# Patient Record
Sex: FEMALE | Race: WHITE | NOT HISPANIC OR LATINO | Employment: OTHER | URBAN - METROPOLITAN AREA
[De-identification: names, ages, dates, MRNs, and addresses within clinical notes are randomized per-mention and may not be internally consistent; named-entity substitution may affect disease eponyms.]

---

## 2017-01-19 ENCOUNTER — OFFICE VISIT (OUTPATIENT)
Dept: INTERNAL MEDICINE | Facility: CLINIC | Age: 82
End: 2017-01-19
Payer: MEDICARE

## 2017-01-19 VITALS
OXYGEN SATURATION: 96 % | DIASTOLIC BLOOD PRESSURE: 60 MMHG | HEIGHT: 69 IN | BODY MASS INDEX: 27.98 KG/M2 | SYSTOLIC BLOOD PRESSURE: 119 MMHG | RESPIRATION RATE: 16 BRPM | WEIGHT: 188.94 LBS | HEART RATE: 90 BPM | TEMPERATURE: 99 F

## 2017-01-19 DIAGNOSIS — E03.9 HYPOTHYROIDISM, UNSPECIFIED TYPE: ICD-10-CM

## 2017-01-19 DIAGNOSIS — Z23 NEED FOR PNEUMOCOCCAL VACCINATION: ICD-10-CM

## 2017-01-19 DIAGNOSIS — E78.00 HYPERCHOLESTEROLEMIA: Primary | ICD-10-CM

## 2017-01-19 DIAGNOSIS — R41.3 MEMORY DEFICITS: ICD-10-CM

## 2017-01-19 DIAGNOSIS — R73.02 IGT (IMPAIRED GLUCOSE TOLERANCE): ICD-10-CM

## 2017-01-19 DIAGNOSIS — L91.8 SKIN TAGS, MULTIPLE ACQUIRED: ICD-10-CM

## 2017-01-19 PROCEDURE — 1157F ADVNC CARE PLAN IN RCRD: CPT | Mod: S$GLB,,, | Performed by: INTERNAL MEDICINE

## 2017-01-19 PROCEDURE — 99214 OFFICE O/P EST MOD 30 MIN: CPT | Mod: 25,S$GLB,, | Performed by: INTERNAL MEDICINE

## 2017-01-19 PROCEDURE — 1160F RVW MEDS BY RX/DR IN RCRD: CPT | Mod: S$GLB,,, | Performed by: INTERNAL MEDICINE

## 2017-01-19 PROCEDURE — 1126F AMNT PAIN NOTED NONE PRSNT: CPT | Mod: S$GLB,,, | Performed by: INTERNAL MEDICINE

## 2017-01-19 PROCEDURE — 90670 PCV13 VACCINE IM: CPT | Mod: S$GLB,,, | Performed by: INTERNAL MEDICINE

## 2017-01-19 PROCEDURE — 1159F MED LIST DOCD IN RCRD: CPT | Mod: S$GLB,,, | Performed by: INTERNAL MEDICINE

## 2017-01-19 PROCEDURE — G0009 ADMIN PNEUMOCOCCAL VACCINE: HCPCS | Mod: S$GLB,,, | Performed by: INTERNAL MEDICINE

## 2017-01-19 RX ORDER — DONEPEZIL HYDROCHLORIDE 5 MG/1
5 TABLET, ORALLY DISINTEGRATING ORAL DAILY
Qty: 30 TABLET | Refills: 11 | Status: SHIPPED | OUTPATIENT
Start: 2017-01-19 | End: 2017-04-29

## 2017-01-19 NOTE — MR AVS SNAPSHOT
[facility] Internal Medicine  [address],  [address]  [facility] [address]  Phone: [phone]  Fax: [phone]                  Chloe Foster   2017 1:40 PM   Office Visit    Description:  Female : 3/25/1929   Provider:  Kimberly Pérez MD   Department:  [facility] Internal Medicine           Reason for Visit     Follow-up     Memory Loss           Diagnoses this Visit        Comments    Hypercholesterolemia    -  Primary     Hypothyroidism, unspecified type         IGT (impaired glucose tolerance)         Memory deficits         Need for pneumococcal vaccination         Skin tags, multiple acquired                To Do List           Goals (5 Years of Data)     None       These Medications        Disp Refills Start End    donepezil (ARICEPT ODT) 5 MG TbDL 30 tablet 11 2017    Take 1 tablet (5 mg total) by mouth once daily. - Oral    Pharmacy: [place] [address] Ph #: [phone]         [facility]     [facility] On Call Nurse Care Line -  Assistance  Registered nurses in the [facility] On Call Center provide clinical advisement, health education, appointment booking, and other advisory services.  Call for this free service at [phone].             Medications           Message regarding Medications     Verify the changes and/or additions to your medication regime listed below are the same as discussed with your clinician today.  If any of these changes or additions are incorrect, please notify your healthcare provider.        START taking these NEW medications        Refills    donepezil (ARICEPT ODT) 5 MG TbDL 11    Sig: Take 1 tablet (5 mg total) by mouth once daily.    Class: Normal    Route: Oral           Verify that the below list of medications is an accurate representation of the medications you are currently taking.  If none reported, the list may be blank. If incorrect, please contact your  "healthcare provider. Carry this list with you in case of emergency.           Current Medications     ANTIOX#10/OM3/DHA/EPA/LUT/ZEAX (I-CAPS ORAL) Take 2 capsules by mouth once daily.    calcium-vitamin D (OSCAL) 250 (625)-125 mg-unit per tablet Take 1 tablet by mouth 2 (two) times daily.    levothyroxine (SYNTHROID) 88 MCG tablet Take 1 tablet (88 mcg total) by mouth before breakfast.    multivit-iron-FA-calcium &mins (ONE-A-DAY WOMENS FORMULA) 18 mg iron-400 mcg-500 mg Ca Tab Take 1 tablet by mouth.      pravastatin (PRAVACHOL) 40 MG tablet TAKE 1 TABLET BY MOUTH ONCE DAILY FOR CHOLESTEROL.    sertraline (ZOLOFT) 100 MG tablet TAKE ONE TABLET BY MOUTH DAILY.    donepezil (ARICEPT ODT) 5 MG TbDL Take 1 tablet (5 mg total) by mouth once daily.           Clinical Reference Information           Vital Signs - Last Recorded  Most recent update: 1/19/2017  1:28 PM by Jennifer Jones MA    BP Pulse Temp Resp Ht Wt    119/60 (BP Location: Left arm, Patient Position: Sitting, BP Method: Manual) 90 98.7 °F (37.1 °C) (Oral) 16 5' 9" (1.753 m) 85.7 kg (188 lb 15 oz)    SpO2 BMI             96% 27.9 kg/m2         Blood Pressure          Most Recent Value    BP  119/60      Allergies as of 1/19/2017     No Known Drug Allergies      Immunizations Administered on Date of Encounter - 1/19/2017     Name Date Dose VIS Date Route    Pneumococcal Conjugate - 13 Valent  Incomplete 0.5 mL 11/5/2015 Intramuscular    TDAP  Incomplete 0.5 mL 2/24/2015 Intramuscular      Orders Placed During Today's Visit      Normal Orders This Visit    Ambulatory referral to Dermatology     Pneumococcal Conjugate Vaccine (13 Valent) (IM)     Tdap Vaccine (Adult)     Future Labs/Procedures Expected by Expires    CBC auto differential  1/19/2017 1/19/2018    Comprehensive metabolic panel  1/19/2017 1/19/2018    Hemoglobin A1c  1/19/2017 1/19/2018    Lipid panel  1/19/2017 1/19/2018    TSH  1/19/2017 1/19/2018    Urinalysis  1/19/2017 1/19/2018      "

## 2017-01-19 NOTE — PROGRESS NOTES
"Subjective:      Patient ID: Chloe Foster is a 87 y.o. female.    Chief Complaint: Follow-up and Memory Loss    HPI: 87y/oWF, whose family has noted that she is a bit more forgetful.  She drives in her neighborhood.  She lives alone.  She has not had any problems with cooking, but forgot where she put money.    Review of Systems   Constitutional: Negative.    HENT: Negative.    Eyes: Negative.    Respiratory: Negative.    Cardiovascular: Negative.    Gastrointestinal: Negative.    Endocrine: Negative.    Genitourinary: Negative.    Musculoskeletal: Negative.    Skin:        Scratches at skin tags on neck   Neurological: Negative.  Negative for dizziness, weakness and headaches.   Hematological: Negative.    Psychiatric/Behavioral: Positive for decreased concentration.       Objective:     Visit Vitals    /60 (BP Location: Left arm, Patient Position: Sitting, BP Method: Manual)    Pulse 90    Temp 98.7 °F (37.1 °C) (Oral)    Resp 16    Ht 5' 9" (1.753 m)    Wt 85.7 kg (188 lb 15 oz)    SpO2 96%    BMI 27.9 kg/m2       Physical Exam   Constitutional: She is oriented to person, place, and time. She appears well-developed and well-nourished.   HENT:   Head: Normocephalic and atraumatic.   Right Ear: External ear normal.   Left Ear: External ear normal.   Nose: Nose normal.   Mouth/Throat: Oropharynx is clear and moist.   Eyes: Conjunctivae and EOM are normal. Pupils are equal, round, and reactive to light.   Neck: Normal range of motion. No JVD present.   Cardiovascular: Normal rate, regular rhythm and normal heart sounds.    Pulmonary/Chest: Effort normal and breath sounds normal.   Abdominal: Soft. Bowel sounds are normal. There is no hepatosplenomegaly. There is no tenderness.   Musculoskeletal: Normal range of motion.   Lymphadenopathy:     She has no cervical adenopathy.   Neurological: She is alert and oriented to person, place, and time.   Skin: Skin is warm and dry.   Multitude of skin tags " all over neck,chest.  AK all over back   Psychiatric: She has a normal mood and affect. Her behavior is normal. Judgment and thought content normal.   Nursing note and vitals reviewed.  Can remember 3 things, do 3 things,draw a clock, good insight.    Assessment:     1. Hypercholesterolemia    2. Hypothyroidism, unspecified type    3. IGT (impaired glucose tolerance)    4. Memory deficits    5. Need for pneumococcal vaccination    6. Skin tags, multiple acquired    No recent labs, will do work up.  Plan:     Hypercholesterolemia  -     Lipid panel; Future; Expected date: 1/19/17    Hypothyroidism, unspecified type  -     TSH; Future; Expected date: 1/19/17    IGT (impaired glucose tolerance)  -     CBC auto differential; Future; Expected date: 1/19/17  -     Comprehensive metabolic panel; Future; Expected date: 1/19/17  -     Urinalysis; Future; Expected date: 1/19/17  -     Hemoglobin A1c; Future; Expected date: 1/19/17    Memory deficits  -     CBC auto differential; Future; Expected date: 1/19/17  -     Comprehensive metabolic panel; Future; Expected date: 1/19/17  -     donepezil (ARICEPT ODT) 5 MG TbDL; Take 1 tablet (5 mg total) by mouth once daily.  Dispense: 30 tablet; Refill: 11    Need for pneumococcal vaccination  -     Pneumococcal Conjugate Vaccine (13 Valent) (IM)    Skin tags, multiple acquired  -     Ambulatory referral to Dermatology    Other orders  -     Cancel: Tdap Vaccine (Adult)

## 2017-02-06 ENCOUNTER — OFFICE VISIT (OUTPATIENT)
Dept: INTERNAL MEDICINE | Facility: CLINIC | Age: 82
End: 2017-02-06
Payer: MEDICARE

## 2017-02-06 VITALS
RESPIRATION RATE: 16 BRPM | SYSTOLIC BLOOD PRESSURE: 110 MMHG | TEMPERATURE: 98 F | WEIGHT: 185.63 LBS | HEIGHT: 69 IN | DIASTOLIC BLOOD PRESSURE: 60 MMHG | BODY MASS INDEX: 27.49 KG/M2 | OXYGEN SATURATION: 98 % | HEART RATE: 80 BPM

## 2017-02-06 DIAGNOSIS — K52.9 GASTROENTERITIS: Primary | ICD-10-CM

## 2017-02-06 DIAGNOSIS — R53.83 FATIGUE, UNSPECIFIED TYPE: ICD-10-CM

## 2017-02-06 PROCEDURE — 99213 OFFICE O/P EST LOW 20 MIN: CPT | Mod: S$GLB,,, | Performed by: INTERNAL MEDICINE

## 2017-02-06 NOTE — MR AVS SNAPSHOT
[facility] Internal Medicine  [address],  [address]  [place] [address]  Phone: [phone]  Fax: [phone]                  Chloe Foster   2017 2:20 PM   Office Visit    Description:  Female : 3/25/1929   Provider:  Kimberly Pérez MD   Department:  [facility] Internal Medicine           Reason for Visit     Follow-up     Urinary Tract Infection                To Do List           Goals (5 Years of Data)     None      [facility]     [facility] Nurse Care [facility] -  Assistance  Registered nurses in the [facility] On Call Center provide clinical advisement, health education, appointment booking, and other advisory services.  Call for this free service at [phone].             Medications           Message regarding Medications     Verify the changes and/or additions to your medication regime listed below are the same as discussed with your clinician today.  If any of these changes or additions are incorrect, please notify your healthcare provider.             Verify that the below list of medications is an accurate representation of the medications you are currently taking.  If none reported, the list may be blank. If incorrect, please contact your healthcare provider. Carry this list with you in case of emergency.           Current Medications     ANTIOX#10/OM3/DHA/EPA/LUT/ZEAX (I-CAPS ORAL) Take 2 capsules by mouth once daily.    calcium-vitamin D (OSCAL) 250 (625)-125 mg-unit per tablet Take 1 tablet by mouth 2 (two) times daily.    donepezil (ARICEPT ODT) 5 MG TbDL Take 1 tablet (5 mg total) by mouth once daily.    levothyroxine (SYNTHROID) 88 MCG tablet Take 1 tablet (88 mcg total) by mouth before breakfast.    multivit-iron-FA-calcium &mins (ONE-A-DAY WOMENS FORMULA) 18 mg iron-400 mcg-500 mg Ca Tab Take 1 tablet by mouth once daily.     pravastatin (PRAVACHOL) 40 MG tablet TAKE 1 TABLET BY MOUTH ONCE DAILY FOR CHOLESTEROL.    sertraline (ZOLOFT) 100  "MG tablet TAKE ONE TABLET BY MOUTH DAILY.           Clinical Reference Information           Your Vitals Were     BP Pulse Temp Resp Height Weight    110/60 (BP Location: Right arm, Patient Position: Sitting, BP Method: Manual) 80 98.1 °F (36.7 °C) (Oral) 16 5' 9" (1.753 m) 84.2 kg (185 lb 10 oz)    SpO2 BMI             98% 27.41 kg/m2         Blood Pressure          Most Recent Value    BP  110/60      Allergies as of 2/6/2017     No Known Drug Allergies      Immunizations Administered on Date of Encounter - 2/6/2017     None      Language Assistance Services     ATTENTION: Language assistance services are available, free of charge. Please call [phone].      ATENCIÓN: Si staciela radha, tiene a carrasco disposición servicios gratuitos de asistencia lingüística. Llame al [phone].     JEFF Ý: N?u b?n nói Ti?ng Vi?t, có các d?ch v? h? tr? ngôn ng? mi?n phí dành cho b?n. G?i s? [phone].         [facility] Internal Medicine complies with applicable Federal civil rights laws and does not discriminate on the basis of race, color, national origin, age, disability, or sex.        "

## 2017-02-06 NOTE — PROGRESS NOTES
"Subjective:      Patient ID: Chloe Foster is a 87 y.o. female.    Chief Complaint: Follow-up (pt in for follow ) and Urinary Tract Infection (frequant urination)    HPI: Went to a  3 days ago. Then went out to eat, may have been too greasy.  By the time she got home she had explosive, uncontrolled diarrhea. All weekend long  Had not felt like doing anything. Has the dwindles. Fatigued, but nothing hurts.  No fever,chills, N,V,C,D.  No dysuria.  No abdominal pain.  Seems to be tolerating the Aricept.  Review of Systems   All other systems reviewed and are negative.      Objective:     Visit Vitals    /60 (BP Location: Right arm, Patient Position: Sitting, BP Method: Manual)    Pulse 80    Temp 98.1 °F (36.7 °C) (Oral)    Resp 16    Ht 5' 9" (1.753 m)    Wt 84.2 kg (185 lb 10 oz)    SpO2 98%    BMI 27.41 kg/m2       Physical Exam   Constitutional: She is oriented to person, place, and time. She appears well-developed and well-nourished. No distress.   HENT:   Head: Normocephalic and atraumatic.   Nose: Nose normal.   Mouth/Throat: Oropharynx is clear and moist.   Eyes: Conjunctivae are normal. Pupils are equal, round, and reactive to light.   Neck: Normal range of motion.   Cardiovascular: Normal rate, regular rhythm and normal heart sounds.    Pulmonary/Chest: Effort normal and breath sounds normal.   Abdominal: Soft. Bowel sounds are normal. She exhibits no distension. There is no hepatosplenomegaly. There is no tenderness.   Musculoskeletal: Normal range of motion.   Neurological: She is alert and oriented to person, place, and time.   Skin: Skin is warm and dry. She is not diaphoretic.   Psychiatric: She has a normal mood and affect. Her behavior is normal.   Nursing note and vitals reviewed.      Assessment:     1. Gastroenteritis    2. Fatigue, unspecified type      Plan:   Symptomatic therapy. Eagle soft diet. Rest. Call in am.  Gastroenteritis    Fatigue, unspecified type    "

## 2017-02-08 RX ORDER — PRAVASTATIN SODIUM 40 MG/1
TABLET ORAL
Qty: 90 TABLET | Refills: 3 | Status: SHIPPED | OUTPATIENT
Start: 2017-02-08 | End: 2018-01-30 | Stop reason: SDUPTHER

## 2017-02-08 RX ORDER — SERTRALINE HYDROCHLORIDE 100 MG/1
100 TABLET, FILM COATED ORAL DAILY
Qty: 90 TABLET | Refills: 3 | Status: SHIPPED | OUTPATIENT
Start: 2017-02-08 | End: 2018-01-30 | Stop reason: SDUPTHER

## 2017-02-13 ENCOUNTER — TELEPHONE (OUTPATIENT)
Dept: ENDOCRINOLOGY | Facility: CLINIC | Age: 82
End: 2017-02-13

## 2017-02-13 ENCOUNTER — OFFICE VISIT (OUTPATIENT)
Dept: INTERNAL MEDICINE | Facility: CLINIC | Age: 82
End: 2017-02-13
Payer: MEDICARE

## 2017-02-13 VITALS
HEIGHT: 69 IN | HEART RATE: 90 BPM | SYSTOLIC BLOOD PRESSURE: 130 MMHG | DIASTOLIC BLOOD PRESSURE: 70 MMHG | OXYGEN SATURATION: 97 % | TEMPERATURE: 99 F | RESPIRATION RATE: 16 BRPM | WEIGHT: 186.06 LBS | BODY MASS INDEX: 27.56 KG/M2

## 2017-02-13 DIAGNOSIS — R14.0 GASEOUS ABDOMINAL DISTENTION: Primary | ICD-10-CM

## 2017-02-13 PROCEDURE — 99213 OFFICE O/P EST LOW 20 MIN: CPT | Mod: S$GLB,,, | Performed by: INTERNAL MEDICINE

## 2017-02-13 NOTE — MR AVS SNAPSHOT
[facility] Internal Medicine  [address],  [address]  [place] [address]  Phone: [phone]  Fax: [phone]                  Chloe Foster   2017 3:20 PM   Office Visit    Description:  Female : 3/25/1929   Provider:  Kimberly Pérez MD   Department:  [facility] Internal Medicine           Reason for Visit     Fatigue     Diarrhea     Gastroesophageal Reflux     Eating Disorder           Diagnoses this Visit        Comments    Gaseous abdominal distention    -  Primary            To Do List           Future Appointments        Provider Department Dept Phone    2017 3:30 PM [facility] Carrion[facility] - Endo/Diab/Metab [phone]      Goals (5 Years of Data)     None      [facility]     [facility] Nurse Care Line -  Assistance  Registered nurses in the [facility] On Call Center provide clinical advisement, health education, appointment booking, and other advisory services.  Call for this free service at [phone].             Medications           Message regarding Medications     Verify the changes and/or additions to your medication regime listed below are the same as discussed with your clinician today.  If any of these changes or additions are incorrect, please notify your healthcare provider.             Verify that the below list of medications is an accurate representation of the medications you are currently taking.  If none reported, the list may be blank. If incorrect, please contact your healthcare provider. Carry this list with you in case of emergency.           Current Medications     ANTIOX#10/OM3/DHA/EPA/LUT/ZEAX (I-CAPS ORAL) Take 2 capsules by mouth once daily.    calcium-vitamin D (OSCAL) 250 (625)-125 mg-unit per tablet Take 1 tablet by mouth 2 (two) times daily.    donepezil (ARICEPT ODT) 5 MG TbDL Take 1 tablet (5 mg total) by mouth once daily.    levothyroxine (SYNTHROID) 88 MCG tablet Take 1 tablet (88 mcg total)  "by mouth before breakfast.    multivit-iron-FA-calcium &mins (ONE-A-DAY WOMENS FORMULA) 18 mg iron-400 mcg-500 mg Ca Tab Take 1 tablet by mouth once daily.     pravastatin (PRAVACHOL) 40 MG tablet TAKE 1 TABLET BY MOUTH ONCE DAILY FOR CHOLESTEROL.    sertraline (ZOLOFT) 100 MG tablet Take 1 tablet (100 mg total) by mouth once daily.           Clinical Reference Information           Your Vitals Were     BP Pulse Temp Resp Height Weight    130/70 (BP Location: Right arm, Patient Position: Sitting, BP Method: Manual) 90 99.1 °F (37.3 °C) (Oral) 16 5' 9" (1.753 m) 84.4 kg (186 lb 1.1 oz)    SpO2 BMI             97% 27.48 kg/m2         Blood Pressure          Most Recent Value    BP  130/70      Allergies as of 2/13/2017     No Known Drug Allergies      Immunizations Administered on Date of Encounter - 2/13/2017     None      Orders Placed During Today's Visit     Future Labs/Procedures Expected by Expires    Creatinine, serum  2/13/2017 4/14/2018    CT Abdomen Pelvis W Wo Contrast  2/13/2017 2/13/2018      Language Assistance Services     ATTENTION: Language assistance services are available, free of charge. Please call [phone].      ATENCIÓN: Si habla radha, tiene a carrasco disposición servicios gratuitos de asistencia lingüística. Llame al [phone].     [facility] Ý: N?u b?n nói Ti?ng Vi?t, có các d?ch v? h? tr? ngôn ng? mi?n phí dành cho b?n. G?i s? [phone].         [facility] Internal Medicine complies with applicable Federal civil rights laws and does not discriminate on the basis of race, color, national origin, age, disability, or sex.        "

## 2017-02-13 NOTE — PROGRESS NOTES
"Subjective:      Patient ID: Chloe Foster is a 87 y.o. female.    Chief Complaint: Fatigue; Diarrhea; Gastroesophageal Reflux (lots of burping); and Eating Disorder (no diesire to eat)    HPI: 87y/oWF, here with her daughter.  Upon both of them checking history: pt. Had had a history years ago of having gallstones and IC.  She was begun on Aricept 1/19/17 for memory loss.  Then at the beginning of Feb 2017, she began having borborigmi. This progressed to having  Increased gas,flatus. She then got " food" poisoning,. Since then, she has had l;oss of appetite,  And fatigue. No fever. On/off diarrhea. No cramping or incontinence.    Review of Systems   Constitutional: Positive for appetite change. Negative for fatigue and fever.   HENT: Negative.    Eyes: Negative.    Gastrointestinal: Positive for abdominal distention, diarrhea and nausea. Negative for abdominal pain, anal bleeding, blood in stool, rectal pain and vomiting.   Genitourinary: Negative.    Musculoskeletal: Negative.    Skin: Negative.    Neurological: Negative.    Hematological: Negative.    Psychiatric/Behavioral: Positive for decreased concentration.       Objective:     Visit Vitals    /70 (BP Location: Right arm, Patient Position: Sitting, BP Method: Manual)    Pulse 90    Temp 99.1 °F (37.3 °C) (Oral)    Resp 16    Ht 5' 9" (1.753 m)    Wt 84.4 kg (186 lb 1.1 oz)    SpO2 97%    BMI 27.48 kg/m2       Physical Exam   Constitutional: She appears well-developed and well-nourished. No distress.   HENT:   Head: Normocephalic and atraumatic.   Right Ear: External ear normal.   Left Ear: External ear normal.   Nose: Nose normal.   Mouth/Throat: Oropharynx is clear and moist. No oropharyngeal exudate.   Eyes: EOM are normal. Pupils are equal, round, and reactive to light.   Neck: Normal range of motion. Neck supple.   Cardiovascular: Normal rate, regular rhythm and normal heart sounds.    Pulmonary/Chest: Effort normal and breath sounds " normal.   Abdominal: Normal appearance. She exhibits distension. She exhibits no shifting dullness and no abdominal bruit. Bowel sounds are increased. There is no hepatosplenomegaly. There is no tenderness.   Musculoskeletal: Normal range of motion.   Neurological: She is alert.   Skin: Skin is warm and dry. She is not diaphoretic.   Psychiatric: She has a normal mood and affect. Her behavior is normal.   Nursing note and vitals reviewed.      Assessment:     1. Gaseous abdominal distention    This also maybe a reaction to the Aricept.  However, it is important to rule out biliary/hepatic or other pathology.  Plan:     Gaseous abdominal distention  -     CT Abdomen Pelvis W Wo Contrast; Future; Expected date: 2/13/17  -     Creatinine, serum; Future; Expected date: 2/13/17

## 2017-02-13 NOTE — TELEPHONE ENCOUNTER
Left vm message that I am sorry I don't have a sooner appointment at this time but try calling back from time to time right at 8:00am as we do get cancels and time slots that release a week or two in advance. Also let us know what her PCP  Said and if she checks her thyroid and feels you need to be seen sooner as well.

## 2017-02-13 NOTE — TELEPHONE ENCOUNTER
----- Message from Tabitha Ocasio sent at 2/13/2017  1:50 PM CST -----  Contact: self   Pt is requesting an earlier appt due to not feeling great loss of appetite, fatigue and wanted to be seen earlier than the appt I was able to scheduled and would like the nurse to call her back to discuss. Pt has schedule an appt with her PCP as well.    Pt can be reached at  [phone].

## 2017-02-16 ENCOUNTER — TELEPHONE (OUTPATIENT)
Dept: INTERNAL MEDICINE | Facility: CLINIC | Age: 82
End: 2017-02-16

## 2017-02-16 NOTE — TELEPHONE ENCOUNTER
----- Message from Rae Smith LPN sent at 2/16/2017  9:12 AM CST -----  Called patient in reference to scheduling her CT scan. Patient states that she was informed to hold off on the CT until her lab results came in. Patient had her lab work on 2/14/17. Does she still need the CT scan? Please advise

## 2017-02-20 ENCOUNTER — TELEPHONE (OUTPATIENT)
Dept: INTERNAL MEDICINE | Facility: CLINIC | Age: 82
End: 2017-02-20

## 2017-02-20 NOTE — TELEPHONE ENCOUNTER
----- Message from Alicia Uribe sent at 2/20/2017  9:09 AM CST -----  Contact: Viry/ daughter   was suppose to call patient about lab results before she has her CT done. Daughter is concerned that they are scheduling the test before she speaks to the doc. Please call Viry back at [phone]

## 2017-02-20 NOTE — TELEPHONE ENCOUNTER
Spoke with daughter and she states that someone from outpatient called the patient and scheduled the CT scan. The patient's daughter stated that they were waiting on a call back from Dr. Pérez regarding the patients lab results. Dr. Pérez sent me a message regarding this patient on Friday but signed the encounter and I didn't get the message. Patient's daughter asked that I call the patient and give her the information.     Spoke with the patient regarding her CT scan and she states that someone from scheduling called and scheduled her CT scan for tomorrow at 9:00am. Patient states that they told her that she needed to get labs done before the CT scan. Patient had her kidney function test done on 2/14/17 but they didn't draw the labs from 1/19/17. Explained to the patient that the labs on 1/19/17 were not drawn and asked the patient isf she could come in earlier on tomorrow before her CT scan and get those done. Patient states that she can but is confused to as why they didn't draw all of her labs on 2/14/17. I explained to her that it could have been because the labs had different dates and maybe they weren't sure if all of the labs needed to be done. I explained to her that I was sorry this happened but I schedule her for labs on 2/23/17 to make sure all of her labs get drawn. Pateint verbalized understanding

## 2017-02-21 ENCOUNTER — TELEPHONE (OUTPATIENT)
Dept: INTERNAL MEDICINE | Facility: CLINIC | Age: 82
End: 2017-02-21

## 2017-02-21 NOTE — TELEPHONE ENCOUNTER
----- Message from Alicia Uribe sent at 2/21/2017  3:38 PM CST -----  Contact: patient   Patient had a CT done today and was told Dr. Pérez would have the results today. She would like a call back [phone]

## 2017-02-22 ENCOUNTER — TELEPHONE (OUTPATIENT)
Dept: INTERNAL MEDICINE | Facility: CLINIC | Age: 82
End: 2017-02-22

## 2017-02-22 ENCOUNTER — PATIENT MESSAGE (OUTPATIENT)
Dept: INTERNAL MEDICINE | Facility: CLINIC | Age: 82
End: 2017-02-22

## 2017-02-22 NOTE — TELEPHONE ENCOUNTER
----- Message from Juany Waller sent at 2/22/2017  9:58 AM CST -----  Daughters is calling to have test results released to patient [employer] account so family can get copies of them     Daughter//Chloe Billy    Is calling on this matter      Reach at [phone]

## 2017-02-23 ENCOUNTER — TELEPHONE (OUTPATIENT)
Dept: INTERNAL MEDICINE | Facility: CLINIC | Age: 82
End: 2017-02-23

## 2017-03-01 ENCOUNTER — TELEPHONE (OUTPATIENT)
Dept: ENDOCRINOLOGY | Facility: CLINIC | Age: 82
End: 2017-03-01

## 2017-03-01 NOTE — TELEPHONE ENCOUNTER
Left vm message that Dr Phillip has no openings at this time and that I think it would be best to keep her scheduled visit with her Nurse Practitioner who works closely with Dr Phillip. Dr Phillip is only seeing patients part time now.

## 2017-03-01 NOTE — TELEPHONE ENCOUNTER
----- Message from Ramona Castillo sent at 2/24/2017 11:06 AM CST -----  Contact: Patient  Patient is requesting a call back in regards to an appointment with Dr. Phillip.    Please call patient at [phone].

## 2017-06-26 RX ORDER — LEVOTHYROXINE SODIUM 88 UG/1
TABLET ORAL
Qty: 30 TABLET | Refills: 11 | OUTPATIENT
Start: 2017-06-26

## 2017-06-28 NOTE — TELEPHONE ENCOUNTER
----- Message from Alicia Jojo sent at 6/28/2017  2:20 PM CDT -----  Contact: PATIENT  Patient called and needs a refill on levothyroxi  88mg, she said this medication was filled by a different doctor because the one  filled was too strong. She now needs a refill through [facility]. Call back [phone]  I do not see

## 2017-06-29 RX ORDER — LEVOTHYROXINE SODIUM 88 UG/1
88 TABLET ORAL DAILY
Qty: 30 TABLET | Refills: 11 | Status: SHIPPED | OUTPATIENT
Start: 2017-06-29 | End: 2017-07-18 | Stop reason: DRUGHIGH

## 2017-06-29 NOTE — TELEPHONE ENCOUNTER
----- Message from Alicia Uribe sent at 6/29/2017 10:08 AM CDT -----  Contact: patient   Patient called back as a follow up on yesterday regarding the refill request for Levothyroxine 88g are does she need a appointment. Please advise

## 2017-06-29 NOTE — TELEPHONE ENCOUNTER
I see 2 different strengths for the levothyroxine 1 for 88mcg and 1 for 100mcg. Please advise if you will fill this medication

## 2017-07-14 ENCOUNTER — TELEPHONE (OUTPATIENT)
Dept: ENDOCRINOLOGY | Facility: CLINIC | Age: 82
End: 2017-07-14

## 2017-07-14 DIAGNOSIS — E89.0 POSTABLATIVE HYPOTHYROIDISM: Primary | ICD-10-CM

## 2017-07-14 NOTE — TELEPHONE ENCOUNTER
Appt. Scheduled for 2pm Tuesday 7/18/17 and labs for Monday 7/17/17 as per Dania Payton Np.  Son is aware.

## 2017-07-14 NOTE — TELEPHONE ENCOUNTER
----- Message from Zahida Ivory sent at 7/14/2017  2:23 PM CDT -----  Contact: Pt  chay Anderson can be reached at [phone]  Pt is requesting a call back to have labs for a sooner appt for thyroid.    Thank you!

## 2017-07-14 NOTE — TELEPHONE ENCOUNTER
Message received from Patient's son regarding scheduling labs at a sooner appointment.  Patient has an appointment to see Dania Rachel NP for 9/8/17.  I don't see any labs ordered.  Would you like labs done prior to 9/8/2017 appt.?

## 2017-07-18 ENCOUNTER — OFFICE VISIT (OUTPATIENT)
Dept: ENDOCRINOLOGY | Facility: CLINIC | Age: 82
End: 2017-07-18
Payer: MEDICARE

## 2017-07-18 VITALS
RESPIRATION RATE: 18 BRPM | HEART RATE: 93 BPM | SYSTOLIC BLOOD PRESSURE: 110 MMHG | BODY MASS INDEX: 27.75 KG/M2 | DIASTOLIC BLOOD PRESSURE: 72 MMHG | HEIGHT: 69 IN | WEIGHT: 187.38 LBS

## 2017-07-18 DIAGNOSIS — E89.0 POSTABLATIVE HYPOTHYROIDISM: Primary | ICD-10-CM

## 2017-07-18 DIAGNOSIS — E04.2 NONTOXIC MULTINODULAR GOITER: ICD-10-CM

## 2017-07-18 DIAGNOSIS — R73.02 IGT (IMPAIRED GLUCOSE TOLERANCE): ICD-10-CM

## 2017-07-18 DIAGNOSIS — N95.9 MENOPAUSAL AND PERIMENOPAUSAL DISORDER: ICD-10-CM

## 2017-07-18 PROCEDURE — 99214 OFFICE O/P EST MOD 30 MIN: CPT | Mod: S$PBB,,, | Performed by: INTERNAL MEDICINE

## 2017-07-18 PROCEDURE — 99999 PR PBB SHADOW E&M-EST. PATIENT-LVL III: CPT | Mod: PBBFAC,,, | Performed by: INTERNAL MEDICINE

## 2017-07-18 PROCEDURE — 1126F AMNT PAIN NOTED NONE PRSNT: CPT | Mod: ,,, | Performed by: INTERNAL MEDICINE

## 2017-07-18 PROCEDURE — 1159F MED LIST DOCD IN RCRD: CPT | Mod: ,,, | Performed by: INTERNAL MEDICINE

## 2017-07-18 PROCEDURE — 99213 OFFICE O/P EST LOW 20 MIN: CPT | Mod: PBBFAC | Performed by: INTERNAL MEDICINE

## 2017-07-18 RX ORDER — LEVOTHYROXINE SODIUM 75 UG/1
75 TABLET ORAL
Qty: 30 TABLET | Refills: 6 | Status: SHIPPED | OUTPATIENT
Start: 2017-07-18 | End: 2017-12-14 | Stop reason: SDUPTHER

## 2017-07-18 NOTE — PROGRESS NOTES
Subjective:      Patient ID: Chloe Foster is a 88 y.o. female.    Chief Complaint:  No chief complaint on file.      History of Present Illness  Ms. Foster is a very pleasant female patient with Postablative hypothyroidism and HLD here today for follow up     She is a prior patient of Dr. Phillip with last documented office visit in 06/2016     This is the patient's initial visit with me today     She is s/p I-131 for treatment of Hyperthyroidism  The patient received 24.8 mCi of I-131 on 06/14/2012     She is currently taking Levothyroxine 88 mcg daily   The patient endorses good compliance with taking LT4 as prescribed. She takes it on an empty stomach with water and waits ~ 45 minutes to 1 hour before eating or taking other medications     She denies palpitations, tremors, dry skin or hair loss   Denies changes in her bowels   Denies heat intolerance   Denies tremors     She had right sided cold nodule on Uptake and scan   FNA biopsy in 2012 and 2015 - both Benign     Last thyroid ultrasound 01/2016:   Stable right lobe nodule.  Our records indicate previous benign FNA in 2015 and in 2012.    RECOMMENDATIONS:   Follow up ultrasound in 2 years       The patient denies dysphagia, shortness of breath in the recumbent position or voice changes     She denies family history of thyroid cancer   Denies personal history of radiation exposure to her head, face or neck     Family history of thyroid disease:   Daughter had jeoy-thyroidectomy  Son total thyroidectomy  Father total thyroidectomy    She is very active     Review of Systems   Constitutional: Negative for unexpected weight change.   Eyes: Negative for visual disturbance.   Respiratory: Negative for shortness of breath.    Cardiovascular: Negative for chest pain.   Gastrointestinal: Negative for abdominal pain.   Musculoskeletal: Negative for arthralgias.   Skin: Negative for wound.   Neurological: Negative for headaches.   Hematological: Does not  bruise/bleed easily.   Psychiatric/Behavioral: Negative for sleep disturbance.       Objective:   Physical Exam   Constitutional: She is oriented to person, place, and time. She appears well-developed. No distress.   Neck: Normal range of motion. Neck supple. No thyromegaly present.   Right thyroid nodule appreciated on examination    Cardiovascular: Normal rate and regular rhythm.    No murmur heard.  Pulmonary/Chest: Effort normal and breath sounds normal.   Musculoskeletal: She exhibits no edema.   Lymphadenopathy:     She has no cervical adenopathy.   Neurological: She is alert and oriented to person, place, and time.   Skin: Skin is warm and dry.   Psychiatric: She has a normal mood and affect.   Nursing note and vitals reviewed.    Lab Review:   Results for orders placed or performed in visit on 07/17/17   TSH   Result Value Ref Range    TSH <0.015 (L) 0.400 - 4.000 uIU/mL   T4, free   Result Value Ref Range    Free T4 1.23 0.71 - 1.51 ng/dL     Assessment:     1. Postablative hypothyroidism  levothyroxine (SYNTHROID) 75 MCG tablet    TSH   2. Nontoxic multinodular goiter  US Soft Tissue Head Neck Thyroid   3. IGT (impaired glucose tolerance)  DXA Bone Density Spine And Hip   4. Menopausal and perimenopausal disorder   DXA Bone Density Spine And Hip     Plan:     1. Postablative hypothyroidism   - clinically euthyroid, biochemically hyperthyroid   - reduce Levothyroxine 75 mcg daily   - reinforced to take as prescribed   - repeat TFTs in 8 weeks   - goal of therapy is a normal TSH   - avoid exogenous hyperthyroidism as this can accelerate bone loss and increase risk of CV complications     2. Nontoxic MNG   - reviewed thyroid ultrasound from 2016  - stable study   - recommend repeat study in 2018  - Right lobe thyroid nodule with benign FNA x 2   - reviewed indications for repeat FNA: interval change, compressive symptoms, abnormal FNA     3. IGT   - stable   - alerted as to the increased risk of T2DM   -  recommend periodic A1c   - continue dietary and lifestyle modifications     4. Menopausal   - check BMD as hyperthyroidism can accelerate bone loss

## 2017-08-09 ENCOUNTER — TELEPHONE (OUTPATIENT)
Dept: OBSTETRICS AND GYNECOLOGY | Facility: CLINIC | Age: 82
End: 2017-08-09

## 2017-08-09 DIAGNOSIS — Z92.89 HISTORY OF SCREENING MAMMOGRAPHY: Primary | ICD-10-CM

## 2017-08-09 DIAGNOSIS — Z12.31 ENCOUNTER FOR SCREENING MAMMOGRAM FOR MALIGNANT NEOPLASM OF BREAST: ICD-10-CM

## 2017-08-09 NOTE — TELEPHONE ENCOUNTER
----- Message from Xiomara Carrion sent at 8/7/2017 11:00 AM CDT -----  Contact: Patient  X _1st Request  _  2nd Request  _  3rd Request    Who:ASA LAZCANO [[id number]]    Why:Patient needs orders placed in the system for her annual mammogram     What Number to Call Back:[phone]    When to Expect a call back: (Before the end of the day)   -- if call after 3:00 call back will be tomorrow.

## 2017-08-09 NOTE — TELEPHONE ENCOUNTER
Returned pt call and informed pt that mammogram orders were placed in system. Pt verbalized understanding

## 2017-09-13 ENCOUNTER — TELEPHONE (OUTPATIENT)
Dept: ENDOCRINOLOGY | Facility: CLINIC | Age: 82
End: 2017-09-13

## 2017-09-13 DIAGNOSIS — E89.0 POSTABLATIVE HYPOTHYROIDISM: Primary | ICD-10-CM

## 2017-12-14 DIAGNOSIS — E89.0 POSTABLATIVE HYPOTHYROIDISM: ICD-10-CM

## 2017-12-14 RX ORDER — LEVOTHYROXINE SODIUM 75 UG/1
75 TABLET ORAL
Qty: 30 TABLET | Refills: 6 | Status: SHIPPED | OUTPATIENT
Start: 2017-12-14 | End: 2018-07-12 | Stop reason: SDUPTHER

## 2017-12-14 NOTE — TELEPHONE ENCOUNTER
----- Message from Deepika Lynn sent at 12/14/2017 12:33 PM CST -----  Contact: self [phone]  Patient called in regards to a refill on her levothyroxine (SYNTHROID) 75 MCG tablet      [facility] [place] [place] - [address], [facility]   [phone]

## 2018-01-03 ENCOUNTER — OFFICE VISIT (OUTPATIENT)
Dept: INTERNAL MEDICINE | Facility: CLINIC | Age: 83
End: 2018-01-03
Payer: MEDICARE

## 2018-01-03 VITALS
TEMPERATURE: 98 F | HEIGHT: 69 IN | OXYGEN SATURATION: 98 % | WEIGHT: 173.81 LBS | BODY MASS INDEX: 25.74 KG/M2 | SYSTOLIC BLOOD PRESSURE: 110 MMHG | DIASTOLIC BLOOD PRESSURE: 60 MMHG | HEART RATE: 50 BPM

## 2018-01-03 DIAGNOSIS — R05.9 COUGH IN ADULT: Primary | ICD-10-CM

## 2018-01-03 PROCEDURE — 99999 PR PBB SHADOW E&M-EST. PATIENT-LVL III: CPT | Mod: PBBFAC,,, | Performed by: INTERNAL MEDICINE

## 2018-01-03 PROCEDURE — 99213 OFFICE O/P EST LOW 20 MIN: CPT | Mod: PBBFAC,PN | Performed by: INTERNAL MEDICINE

## 2018-01-03 PROCEDURE — 99213 OFFICE O/P EST LOW 20 MIN: CPT | Mod: S$PBB,,, | Performed by: INTERNAL MEDICINE

## 2018-01-03 RX ORDER — NITROFURANTOIN 25; 75 MG/1; MG/1
100 CAPSULE ORAL 2 TIMES DAILY
Refills: 0 | COMMUNITY
Start: 2017-12-26 | End: 2018-01-03

## 2018-01-03 RX ORDER — HYDROCODONE BITARTRATE AND ACETAMINOPHEN 5; 325 MG/1; MG/1
TABLET ORAL
Refills: 0 | COMMUNITY
Start: 2017-12-06 | End: 2018-01-03

## 2018-01-03 RX ORDER — PHENAZOPYRIDINE HYDROCHLORIDE 200 MG/1
200 TABLET, FILM COATED ORAL 3 TIMES DAILY
Refills: 0 | COMMUNITY
Start: 2017-12-26 | End: 2018-01-03

## 2018-01-03 NOTE — PROGRESS NOTES
"Subjective:      Patient ID: Chloe Foster is a 88 y.o. female.    Chief Complaint: Cough    HPI: 88 y.o. White female , daughter brings her in because she developed a cough  Last night, and the family did not want this to get any worse.  She has not had a cold,runny nose,headache,earache,sore throat or mucous.  No fever,chills,bodyaches.  No N,V,D,C.  No dysuria,hematuria.       Review of Systems    Objective:   /60   Pulse (!) 50   Temp 98.2 °F (36.8 °C) (Oral)   Ht 5' 8.75" (1.746 m)   Wt 78.8 kg (173 lb 13.3 oz)   SpO2 98%   BMI 25.86 kg/m²     Physical Exam   Constitutional: She appears well-developed and well-nourished.   HENT:   Head: Normocephalic and atraumatic.   Right Ear: Tympanic membrane, external ear and ear canal normal.   Left Ear: Tympanic membrane, external ear and ear canal normal.   Nose: Nose normal.   Mouth/Throat: Uvula is midline, oropharynx is clear and moist and mucous membranes are normal.   Eyes: Conjunctivae and EOM are normal. Pupils are equal, round, and reactive to light.   Neck: Normal range of motion. Neck supple.   Cardiovascular: Normal rate, regular rhythm and normal heart sounds.    Pulmonary/Chest: Effort normal and breath sounds normal.   Musculoskeletal: Normal range of motion.   Neurological: She is alert.   Skin: Skin is warm and dry.   Psychiatric: She has a normal mood and affect.   Nursing note and vitals reviewed.      Assessment:     1. Cough in adult    Etiology unknown.  Rather early in the course to determine further care.  Plan:     Cough in adult    Fluids, rest, OTC Delsum prn.  Call or come in if worsens.    "

## 2018-01-10 ENCOUNTER — OFFICE VISIT (OUTPATIENT)
Dept: INTERNAL MEDICINE | Facility: CLINIC | Age: 83
End: 2018-01-10
Payer: MEDICARE

## 2018-01-10 VITALS
HEART RATE: 80 BPM | WEIGHT: 184.13 LBS | HEIGHT: 69 IN | BODY MASS INDEX: 27.27 KG/M2 | SYSTOLIC BLOOD PRESSURE: 126 MMHG | DIASTOLIC BLOOD PRESSURE: 60 MMHG | OXYGEN SATURATION: 95 %

## 2018-01-10 DIAGNOSIS — R05.3 COUGH, PERSISTENT: Primary | ICD-10-CM

## 2018-01-10 DIAGNOSIS — J11.1 BRONCHITIS WITH FLU: ICD-10-CM

## 2018-01-10 PROCEDURE — 96372 THER/PROPH/DIAG INJ SC/IM: CPT | Mod: PBBFAC,PN

## 2018-01-10 PROCEDURE — 99213 OFFICE O/P EST LOW 20 MIN: CPT | Mod: PBBFAC,PN,25 | Performed by: INTERNAL MEDICINE

## 2018-01-10 PROCEDURE — 99999 PR PBB SHADOW E&M-EST. PATIENT-LVL III: CPT | Mod: PBBFAC,,, | Performed by: INTERNAL MEDICINE

## 2018-01-10 PROCEDURE — 99213 OFFICE O/P EST LOW 20 MIN: CPT | Mod: S$PBB,,, | Performed by: INTERNAL MEDICINE

## 2018-01-10 RX ORDER — AZITHROMYCIN 250 MG/1
250 TABLET, FILM COATED ORAL DAILY
Qty: 10 TABLET | Refills: 0 | Status: SHIPPED | OUTPATIENT
Start: 2018-01-10 | End: 2018-01-17

## 2018-01-10 RX ORDER — PREDNISONE 10 MG/1
10 TABLET ORAL DAILY
Qty: 10 TABLET | Refills: 0 | Status: SHIPPED | OUTPATIENT
Start: 2018-01-10 | End: 2018-01-17

## 2018-01-10 RX ORDER — BETAMETHASONE SODIUM PHOSPHATE AND BETAMETHASONE ACETATE 3; 3 MG/ML; MG/ML
12 INJECTION, SUSPENSION INTRA-ARTICULAR; INTRALESIONAL; INTRAMUSCULAR; SOFT TISSUE
Status: COMPLETED | OUTPATIENT
Start: 2018-01-10 | End: 2018-01-10

## 2018-01-10 RX ORDER — BENZONATATE 200 MG/1
200 CAPSULE ORAL 2 TIMES DAILY PRN
Qty: 20 CAPSULE | Refills: 0 | Status: SHIPPED | OUTPATIENT
Start: 2018-01-10 | End: 2018-01-17

## 2018-01-10 RX ADMIN — BETAMETHASONE ACETATE AND BETAMETHASONE SODIUM PHOSPHATE 12 MG: 3; 3 INJECTION, SUSPENSION INTRA-ARTICULAR; INTRALESIONAL; INTRAMUSCULAR; SOFT TISSUE at 03:01

## 2018-01-10 NOTE — PROGRESS NOTES
"Subjective:      Patient ID: Chloe Foster is a 88 y.o. female.    Chief Complaint: Chest Congestion and Cough    HPI: 88 y.o. White female  , here in return because she began with a stronger cough,dry, after initially   Being seen 1/3/18.  This is now persistent and bothersome. She has NO myalgias,arthralgias.  No nasal symptoms, or sore throat.  No fever,chills.  No SOB.        Review of Systems   Constitutional: Negative for appetite change, chills, diaphoresis, fatigue and fever.   HENT: Negative for congestion, ear pain, postnasal drip, rhinorrhea and sore throat.    Eyes: Negative.    Respiratory: Positive for cough. Negative for apnea, choking, chest tightness, shortness of breath, wheezing and stridor.    Cardiovascular: Negative for chest pain and palpitations.   Gastrointestinal: Negative.    Endocrine: Negative.    Genitourinary: Negative for dysuria, hematuria, urgency and vaginal pain.   Musculoskeletal: Negative.    Skin: Negative.    Allergic/Immunologic: Negative.    Neurological: Negative.    Hematological: Negative.    Psychiatric/Behavioral: Negative.        Objective:   /60   Pulse 80   Ht 5' 8.75" (1.746 m)   Wt 83.5 kg (184 lb 1.6 oz)   SpO2 95%   BMI 27.38 kg/m²     Physical Exam   Constitutional: She appears well-developed and well-nourished.   HENT:   Head: Normocephalic and atraumatic.   Right Ear: External ear normal.   Left Ear: External ear normal.   Nose: Nose normal.   Mouth/Throat: Oropharynx is clear and moist.   Eyes: Conjunctivae and EOM are normal.   Neck: Normal range of motion. Neck supple.   Cardiovascular: Normal rate, regular rhythm and normal heart sounds.    Pulmonary/Chest: Effort normal and breath sounds normal. She has no wheezes. She has no rales.   Abdominal: Soft. Bowel sounds are normal.   Musculoskeletal: Normal range of motion.   Neurological: She is alert.   Skin: Skin is warm and dry.   Psychiatric: She has a normal mood and affect. Her behavior is " normal.   Nursing note and vitals reviewed.      Assessment:     1. Cough, persistent    2. Bronchitis with flu    Acute issues, in elderly lady.  Plan:     Cough, persistent  -     betamethasone acetate-betamethasone sodium phosphate injection 12 mg; Inject 2 mLs (12 mg total) into the muscle one time.  -     predniSONE (DELTASONE) 10 MG tablet; Take 1 tablet (10 mg total) by mouth once daily.  Dispense: 10 tablet; Refill: 0  -     benzonatate (TESSALON) 200 MG capsule; Take 1 capsule (200 mg total) by mouth 2 (two) times daily as needed for Cough.  Dispense: 20 capsule; Refill: 0    Bronchitis with flu  -     betamethasone acetate-betamethasone sodium phosphate injection 12 mg; Inject 2 mLs (12 mg total) into the muscle one time.  -     predniSONE (DELTASONE) 10 MG tablet; Take 1 tablet (10 mg total) by mouth once daily.  Dispense: 10 tablet; Refill: 0  -     azithromycin (Z-YNES) 250 MG tablet; Take 1 tablet (250 mg total) by mouth once daily.  Dispense: 10 tablet; Refill: 0

## 2018-01-15 ENCOUNTER — TELEPHONE (OUTPATIENT)
Dept: INTERNAL MEDICINE | Facility: CLINIC | Age: 83
End: 2018-01-15

## 2018-01-15 NOTE — TELEPHONE ENCOUNTER
Spoke with patient's daughter and advised her of information below. Daughter verbalized understanding

## 2018-01-15 NOTE — TELEPHONE ENCOUNTER
----- Message from Radha Jackson sent at 1/15/2018  9:32 AM CST -----  Contact: Daughter. [phone]  Patient's daughter would like to speak with you about the antibiotics making the patient jittery. Please advise

## 2018-01-15 NOTE — TELEPHONE ENCOUNTER
Patient is feeling jittery and is not sure what is causing it. Patient also states that she is having an urge to urinate. Please advise

## 2018-01-18 ENCOUNTER — TELEPHONE (OUTPATIENT)
Dept: INTERNAL MEDICINE | Facility: CLINIC | Age: 83
End: 2018-01-18

## 2018-01-18 NOTE — TELEPHONE ENCOUNTER
----- Message from Olena Ellyn sent at 1/18/2018  8:49 AM CST -----  Contact: daughter, [phone], [phone]  Requests to give you an update regarding patient being seen at emergency room yesterday. Please advise.

## 2018-01-24 ENCOUNTER — OFFICE VISIT (OUTPATIENT)
Dept: UROLOGY | Facility: CLINIC | Age: 83
End: 2018-01-24
Payer: MEDICARE

## 2018-01-24 VITALS
SYSTOLIC BLOOD PRESSURE: 128 MMHG | WEIGHT: 183.88 LBS | DIASTOLIC BLOOD PRESSURE: 65 MMHG | BODY MASS INDEX: 27.35 KG/M2 | HEART RATE: 107 BPM

## 2018-01-24 DIAGNOSIS — A49.9 BACTERIAL UTI: Primary | ICD-10-CM

## 2018-01-24 DIAGNOSIS — N81.10 FEMALE CYSTOCELE: ICD-10-CM

## 2018-01-24 DIAGNOSIS — N39.0 BACTERIAL UTI: Primary | ICD-10-CM

## 2018-01-24 PROCEDURE — 99213 OFFICE O/P EST LOW 20 MIN: CPT | Mod: PBBFAC | Performed by: PHYSICIAN ASSISTANT

## 2018-01-24 PROCEDURE — 99213 OFFICE O/P EST LOW 20 MIN: CPT | Mod: S$PBB,,, | Performed by: PHYSICIAN ASSISTANT

## 2018-01-24 PROCEDURE — 99999 PR PBB SHADOW E&M-EST. PATIENT-LVL III: CPT | Mod: PBBFAC,,, | Performed by: PHYSICIAN ASSISTANT

## 2018-01-25 NOTE — PROGRESS NOTES
CHIEF COMPLAINT:    Mrs. Foster is a 88 y.o. female presenting for UTI follow up.  PRESENTING ILLNESS:    Chloe Foster is a 88 y.o. female  who presents for UTI follow up.  She was seen in the ED on 1/17/18 for UTI.  Culture was ESCHERICHIA COLI +.  She was started on keflex.    She finished keflex today.  She is no longer experiencing confusion, dysuria and urgency.  She reports feeling something in her vagina that causes discomfort.  It typically causes discomfort when she has an UTI.    She also reports nervousness and feeling jittery.  She is unsure if her zoloft needs to be adjusted.    She has a history of urinary continence.  Wears a panty liner.  She reports urinary incontinence at any time.    REVIEW OF SYSTEMS:      Constitutional: Negative for fever and chills.   HENT: Negative for hearing loss.   Eyes: Negative for visual disturbance.   Respiratory: Negative for shortness of breath.   Cardiovascular: Negative for chest pain.   Gastrointestinal: Negative for vomiting, and constipation.   Genitourinary:  See HPI  Neurological: Negative for dizziness.   Hematological: Does not bruise/bleed easily.   Psychiatric/Behavioral: Negative for confusion.     PATIENT HISTORY:    Past Medical History:   Diagnosis Date    Anxiety     Black eye 5/6/2013    Dyslipidemia     Hyperthyroidism     IGT (impaired glucose tolerance)     Mitral valve prolapse     Nontoxic multinodular goiter 2/18/2013    Primary hypothyroidism 5/18/2015    Shingles     left side of chest and back    Thyroid nodule 2/18/2013    Urinary tract infection        Past Surgical History:   Procedure Laterality Date    CATARACT EXTRACTION BILATERAL W/ ANTERIOR VITRECTOMY      DILATION AND CURETTAGE OF UTERUS      x2       Family History   Problem Relation Age of Onset    Thyroid disease Father     Thyroid disease Son     Thyroid disease Daughter     Diabetes Neg Hx     Breast cancer Neg Hx     Colon cancer Neg Hx     Ovarian  cancer Neg Hx        Social History     Social History    Marital status:      Spouse name: N/A    Number of children: N/A    Years of education: N/A     Occupational History    Retired      Social History Main Topics    Smoking status: Never Smoker    Smokeless tobacco: Never Used    Alcohol use No    Drug use: No    Sexual activity: No     Other Topics Concern    Not on file     Social History Narrative    Her  passed away in March 2014. She lives with her  and has sitters and a son who helps. She has four children two of whom are twins. Her son is running the family business. Her children are all educated. She worked doing  for the [employer] at [employer]. She has an education degree and is a retired . She started a travel agency for 15 years. She is a non-smoker and denies alcohol or substance abuse.                            Allergies:  Bactrim [sulfamethoxazole-trimethoprim]    Medications:    Current Outpatient Prescriptions:     ANTIOX#10/OM3/DHA/EPA/LUT/ZEAX (I-CAPS ORAL), Take 2 capsules by mouth once daily., Disp: , Rfl:     calcium-vitamin D (OSCAL) 250 (625)-125 mg-unit per tablet, Take 1 tablet by mouth 2 (two) times daily., Disp: , Rfl:     levothyroxine (SYNTHROID) 75 MCG tablet, Take 1 tablet (75 mcg total) by mouth before breakfast. (Patient taking differently: Take 88 mcg by mouth before breakfast. ), Disp: 30 tablet, Rfl: 6    multivit-iron-FA-calcium &mins (ONE-A-DAY WOMENS FORMULA) 18 mg iron-400 mcg-500 mg Ca Tab, Take 1 tablet by mouth once daily. , Disp: , Rfl:     pravastatin (PRAVACHOL) 40 MG tablet, TAKE 1 TABLET BY MOUTH ONCE DAILY FOR CHOLESTEROL., Disp: 90 tablet, Rfl: 3    sertraline (ZOLOFT) 100 MG tablet, Take 1 tablet (100 mg total) by mouth once daily., Disp: 90 tablet, Rfl: 3    PHYSICAL EXAMINATION:    Constitutional: She appears well-developed and well-nourished.  She is in no apparent distress.    Eyes: No scleral icterus  noted bilaterally.  No discharge noted bilaterally.      Cardiovascular: Normal rate.  No pitting edema noted in lower extremities bilaterally    Pulmonary/Chest: Effort normal. No respiratory distress.     Abdominal:  She exhibits no distension.  There is no CVA tenderness.     Lymphadenopathy:          Right: No supraclavicular adenopathy present.        Left: No supraclavicular adenopathy present.     Neurological: She is alert and oriented to person, place, and time.     Skin: Skin is warm and dry.     Psych: Cooperative with normal affect.    Genitourinary: External vagina normal.    Urethra and bladder are nontender to bimanual exam  Cystocele noted.    No adnexal masses  Consent verbally obtained.  Betadine prep was applied to the urethral meatus. An in and out cath was performed after voiding.  The PVR was 10 ml.      Physical Exam      LABS:    U/a: 1.020, pH 5, negative.     IMPRESSION:    Encounter Diagnoses   Name Primary?    Bacterial UTI Yes    Female cystocele        PLAN:    UTI resolved.  Urine today shows no signs of infection.    Explained that given lack of symptoms, no follow up urine culture is needed as it is not advised to treat asymptomatic bacteruria.    Discussed cystocele and treatment options.  She will like to think about pessary placement.  Names of GYN at [facility] were provided to patient if she decides to proceed with pessary.     Follow up prn.     Jennifer Chester PA-C

## 2018-01-26 ENCOUNTER — PATIENT MESSAGE (OUTPATIENT)
Dept: INTERNAL MEDICINE | Facility: CLINIC | Age: 83
End: 2018-01-26

## 2018-01-26 DIAGNOSIS — E04.2 NONTOXIC MULTINODULAR GOITER: Primary | ICD-10-CM

## 2018-01-26 NOTE — TELEPHONE ENCOUNTER
Advised pt daughter per Dr. Pérez. Pt should be taking synthroid 1st thing in the morning, Zoloft at lunch and Pravastain right before bed.

## 2018-01-30 ENCOUNTER — OFFICE VISIT (OUTPATIENT)
Dept: INTERNAL MEDICINE | Facility: CLINIC | Age: 83
End: 2018-01-30
Payer: MEDICARE

## 2018-01-30 VITALS
DIASTOLIC BLOOD PRESSURE: 62 MMHG | HEART RATE: 91 BPM | SYSTOLIC BLOOD PRESSURE: 134 MMHG | OXYGEN SATURATION: 98 % | HEIGHT: 69 IN | WEIGHT: 181.69 LBS | BODY MASS INDEX: 26.91 KG/M2

## 2018-01-30 DIAGNOSIS — R53.83 FATIGUE, UNSPECIFIED TYPE: Primary | ICD-10-CM

## 2018-01-30 DIAGNOSIS — E78.00 HYPERCHOLESTEROLEMIA: ICD-10-CM

## 2018-01-30 DIAGNOSIS — I63.9: ICD-10-CM

## 2018-01-30 DIAGNOSIS — N39.0 RECURRENT UTI: ICD-10-CM

## 2018-01-30 PROCEDURE — 99999 PR PBB SHADOW E&M-EST. PATIENT-LVL III: CPT | Mod: PBBFAC,,, | Performed by: INTERNAL MEDICINE

## 2018-01-30 PROCEDURE — 99214 OFFICE O/P EST MOD 30 MIN: CPT | Mod: S$PBB,,, | Performed by: INTERNAL MEDICINE

## 2018-01-30 PROCEDURE — 1126F AMNT PAIN NOTED NONE PRSNT: CPT | Mod: ,,, | Performed by: INTERNAL MEDICINE

## 2018-01-30 PROCEDURE — 99213 OFFICE O/P EST LOW 20 MIN: CPT | Mod: PBBFAC,PN | Performed by: INTERNAL MEDICINE

## 2018-01-30 PROCEDURE — 1159F MED LIST DOCD IN RCRD: CPT | Mod: ,,, | Performed by: INTERNAL MEDICINE

## 2018-01-30 RX ORDER — PRAVASTATIN SODIUM 40 MG/1
TABLET ORAL
Qty: 90 TABLET | Refills: 3 | Status: SHIPPED | OUTPATIENT
Start: 2018-01-30 | End: 2019-03-07 | Stop reason: SDUPTHER

## 2018-01-30 RX ORDER — SERTRALINE HYDROCHLORIDE 100 MG/1
TABLET, FILM COATED ORAL
Qty: 135 TABLET | Refills: 3 | Status: SHIPPED | OUTPATIENT
Start: 2018-01-30 | End: 2019-09-11 | Stop reason: SDUPTHER

## 2018-01-30 NOTE — PROGRESS NOTES
"Subjective:      Patient ID: Chloe Foster is a 88 y.o. female.    Chief Complaint: Shortness of Breath; Anxious; and Results    HPI: 88 y.o. White female , with her son. Issues:  -Anxiety, sighs. Has been on Zoloft for years.  -Fatigued all the time. Can walk miles with her daughter, and gets tired, but no  CP,palpitatioons,SOB,dizziness.  -Has prolapsed bladder, and sensation of having to urinate frequently.   Apparently her daughter took her for a mental status exam quite awhile back, and the [facility] put her on Namenda.  However, she had " some type of side effect", so she was taken off this.    I have reviewed the CT of Head, labs, cultures.        Review of Systems   Constitutional: Positive for activity change and fatigue.        Doing less.  Has company at night.   HENT: Negative.    Eyes: Negative.    Respiratory: Positive for shortness of breath. Negative for cough and wheezing.         Sighs, occasional feeling of SOB.   Cardiovascular: Negative for chest pain, palpitations and leg swelling.   Gastrointestinal: Negative.    Endocrine: Negative.    Genitourinary: Positive for frequency and urgency.   Musculoskeletal: Negative.    Skin: Negative.    Allergic/Immunologic: Negative.    Neurological: Negative.    Hematological: Negative.    Psychiatric/Behavioral: Positive for confusion.       Objective:   /62   Pulse 91   Ht 5' 8.75" (1.746 m)   Wt 82.4 kg (181 lb 10.5 oz)   SpO2 98%   BMI 27.02 kg/m²     Physical Exam   Constitutional: She appears well-developed and well-nourished.   HENT:   Head: Normocephalic and atraumatic.   Right Ear: External ear normal.   Left Ear: External ear normal.   Nose: Nose normal.   Mouth/Throat: Oropharynx is clear and moist.   Eyes: Conjunctivae are normal. Pupils are equal, round, and reactive to light.   Neck: Normal range of motion. Neck supple.   Cardiovascular: Normal rate, regular rhythm and normal heart sounds.    Pulmonary/Chest: Effort normal and " breath sounds normal.   Abdominal: Soft. Bowel sounds are normal.   Neurological: She is alert.   Skin: Skin is warm and dry.   Psychiatric: Her affect is blunt. She is slowed. She exhibits abnormal recent memory and abnormal remote memory.   Nursing note and vitals reviewed.  No desaturation with walking > 50 feet.    Assessment:     1. Fatigue, unspecified type    2. Recurrent UTI    3. Hypercholesterolemia    4. White matter periventricular infarction    These factors all seem to be contributing to the loss of memory.  Will try increasing the Zoloft first.  Plan:     Fatigue, unspecified type  -     sertraline (ZOLOFT) 100 MG tablet; Take 11/2 tabs ( 150) daily  Dispense: 135 tablet; Refill: 3    Recurrent UTI  -     Urinalysis; Future; Expected date: 01/30/2018  -     Urine culture; Future; Expected date: 01/30/2018    Hypercholesterolemia  -     pravastatin (PRAVACHOL) 40 MG tablet; TAKE 1 TABLET BY MOUTH ONCE DAILY FOR CHOLESTEROL.  Dispense: 90 tablet; Refill: 3    White matter periventricular infarction

## 2018-01-31 ENCOUNTER — PATIENT MESSAGE (OUTPATIENT)
Dept: INTERNAL MEDICINE | Facility: CLINIC | Age: 83
End: 2018-01-31

## 2018-03-12 ENCOUNTER — PATIENT MESSAGE (OUTPATIENT)
Dept: INTERNAL MEDICINE | Facility: CLINIC | Age: 83
End: 2018-03-12

## 2018-03-12 DIAGNOSIS — E78.00 HYPERCHOLESTEROLEMIA: Primary | ICD-10-CM

## 2018-03-13 NOTE — TELEPHONE ENCOUNTER
I placed orders for the lipid. I am not sure if you want any other labs ordered. Please review and sign

## 2018-07-06 ENCOUNTER — PATIENT MESSAGE (OUTPATIENT)
Dept: ENDOCRINOLOGY | Facility: CLINIC | Age: 83
End: 2018-07-06

## 2018-07-06 DIAGNOSIS — E89.0 POSTABLATIVE HYPOTHYROIDISM: Primary | ICD-10-CM

## 2018-07-12 ENCOUNTER — OFFICE VISIT (OUTPATIENT)
Dept: ENDOCRINOLOGY | Facility: CLINIC | Age: 83
End: 2018-07-12
Payer: MEDICARE

## 2018-07-12 VITALS
BODY MASS INDEX: 28.83 KG/M2 | DIASTOLIC BLOOD PRESSURE: 72 MMHG | WEIGHT: 190.25 LBS | SYSTOLIC BLOOD PRESSURE: 114 MMHG | HEART RATE: 76 BPM | HEIGHT: 68 IN

## 2018-07-12 DIAGNOSIS — E89.0 POSTABLATIVE HYPOTHYROIDISM: Primary | ICD-10-CM

## 2018-07-12 DIAGNOSIS — E04.2 NONTOXIC MULTINODULAR GOITER: ICD-10-CM

## 2018-07-12 DIAGNOSIS — R73.02 IGT (IMPAIRED GLUCOSE TOLERANCE): ICD-10-CM

## 2018-07-12 PROCEDURE — 99213 OFFICE O/P EST LOW 20 MIN: CPT | Mod: PBBFAC | Performed by: INTERNAL MEDICINE

## 2018-07-12 PROCEDURE — 99999 PR PBB SHADOW E&M-EST. PATIENT-LVL III: CPT | Mod: PBBFAC,,, | Performed by: INTERNAL MEDICINE

## 2018-07-12 PROCEDURE — 99214 OFFICE O/P EST MOD 30 MIN: CPT | Mod: S$PBB,,, | Performed by: INTERNAL MEDICINE

## 2018-07-12 RX ORDER — LEVOTHYROXINE SODIUM 75 UG/1
75 TABLET ORAL
Qty: 90 TABLET | Refills: 3 | Status: SHIPPED | OUTPATIENT
Start: 2018-07-12 | End: 2019-06-12 | Stop reason: SDUPTHER

## 2018-07-12 NOTE — PROGRESS NOTES
Subjective:      Patient ID: Chloe Foster is a 89 y.o. female.    Chief Complaint:  Hypothyroidism       History of Present Illness  Ms. Foster is a very pleasant female patient with Postablative hypothyroidism here today for follow up     Last office visit in July 2017     She is s/p I-131 for treatment of Hyperthyroidism  The patient received 24.8 mCi of I-131 on 06/14/2012     She is currently taking Levothyroxine 75 mcg daily   The patient endorses compliance with taking LT4 as prescribed. She takes it on an empty stomach with water and waits ~ 1 hour before eating or taking other medications     She denies palpitations, tremors, dry skin or hair loss   Denies changes in her bowels   Denies heat intolerance   Denies tremors     She had right sided cold nodule on Uptake and scan   FNA biopsy in 2012 and 2015 - both Benign     Last thyroid ultrasound 01/2016:   Stable right lobe nodule.  Our records indicate previous benign FNA in 2015 and in 2012.    RECOMMENDATIONS:   Follow up ultrasound in 2 years       The patient denies dysphagia, shortness of breath in the recumbent position or voice changes     She denies family history of thyroid cancer   Denies personal history of radiation exposure to her head, face or neck     Family history of thyroid disease:   Daughter had joey-thyroidectomy  Son total thyroidectomy  Father total thyroidectomy    She remains active     Review of Systems   Constitutional: Negative for unexpected weight change.   Eyes: Negative for visual disturbance.   Respiratory: Negative for shortness of breath.    Cardiovascular: Negative for chest pain.   Gastrointestinal: Negative for abdominal pain.   Musculoskeletal: Negative for arthralgias.   Skin: Negative for wound.   Neurological: Negative for headaches.   Hematological: Does not bruise/bleed easily.   Psychiatric/Behavioral: Negative for sleep disturbance.     Objective:   Physical Exam   Constitutional: She is oriented to person,  place, and time. She appears well-developed. No distress.   Neck: Normal range of motion. Neck supple. No thyromegaly present.   Unable to appreciate nodule on examination    Cardiovascular: Normal rate and regular rhythm.    Pulmonary/Chest: Effort normal and breath sounds normal.   Musculoskeletal: She exhibits no edema.   Lymphadenopathy:     She has no cervical adenopathy.   Neurological: She is alert and oriented to person, place, and time.   Skin: Skin is warm and dry.   Psychiatric: She has a normal mood and affect. Her behavior is normal.   Nursing note and vitals reviewed.    Lab Review:   Results for orders placed or performed in visit on 07/10/18   TSH   Result Value Ref Range    TSH 1.650 0.400 - 4.000 uIU/mL     Results for ASA LAZCANO (MRN [id number]) as of 7/12/2018 14:49   Ref. Range 1/17/2018 19:21   Sodium Latest Ref Range: 136 - 145 mmol/L 136   Potassium Latest Ref Range: 3.5 - 5.1 mmol/L 4.4   Chloride Latest Ref Range: 95 - 110 mmol/L 103   CO2 Latest Ref Range: 23 - 29 mmol/L 26   Anion Gap Latest Ref Range: 8 - 16 mmol/L 7 (L)   BUN, Bld Latest Ref Range: 7 - 17 mg/dL 19 (H)   Creatinine Latest Ref Range: 0.50 - 1.40 mg/dL 0.83   eGFR if non African American Latest Ref Range: >60 mL/min/1.73 m^2 >60.0   eGFR if African American Latest Ref Range: >60 mL/min/1.73 m^2 >60.0   Glucose Latest Ref Range: 70 - 110 mg/dL 104   Calcium Latest Ref Range: 8.7 - 10.5 mg/dL 9.3   Alkaline Phosphatase Latest Ref Range: 38 - 126 U/L 77   Total Protein Latest Ref Range: 6.0 - 8.4 g/dL 7.3   Albumin Latest Ref Range: 3.5 - 5.2 g/dL 4.2   Total Bilirubin Latest Ref Range: 0.1 - 1.0 mg/dL 0.4   AST Latest Ref Range: 15 - 46 U/L 26   ALT Latest Ref Range: 10 - 44 U/L 37     Results for ASA LAZCANO (MRN [id number]) as of 7/12/2018 14:49   Ref. Range 2/21/2017 07:28   Hemoglobin A1C Latest Ref Range: 4.5 - 6.2 % 5.7   Estimated Avg Glucose Latest Ref Range: 68 - 131 mg/dL 117     Assessment:     1.  Postablative hypothyroidism  levothyroxine (SYNTHROID) 75 MCG tablet   2. Nontoxic multinodular goiter  US Soft Tissue Head Neck Thyroid   3. IGT (impaired glucose tolerance)  Hemoglobin A1c     Plan:     1. Postablative hypothyroidism   - clinically and biochemically euthyroid   - continue Levothyroxine 75 mcg daily   - reinforced to take as prescribed   - goal of therapy is a normal TSH   - avoid exogenous hyperthyroidism as this can accelerate bone loss and increase risk of CV complications     2. Nontoxic MNG   - reviewed thyroid ultrasound from 2016  - stable study   - recommend repeat study in 2018, will schedule   - reviewed indications for repeat FNA: interval change, compressive symptoms, abnormal FNA     3. IGT   - stable   - alerted as to the increased risk of T2DM   - recommend periodic A1c   - continue dietary and lifestyle modifications

## 2018-10-19 ENCOUNTER — IMMUNIZATION (OUTPATIENT)
Dept: INTERNAL MEDICINE | Facility: CLINIC | Age: 83
End: 2018-10-19
Payer: MEDICARE

## 2018-10-19 PROCEDURE — 90662 IIV NO PRSV INCREASED AG IM: CPT | Mod: PBBFAC,PN

## 2018-10-24 ENCOUNTER — HOSPITAL ENCOUNTER (OUTPATIENT)
Dept: ENDOCRINOLOGY | Facility: CLINIC | Age: 83
Discharge: HOME | End: 2018-10-24
Attending: INTERNAL MEDICINE
Payer: MEDICARE

## 2018-10-24 DIAGNOSIS — E04.2 NONTOXIC MULTINODULAR GOITER: ICD-10-CM

## 2018-10-24 PROCEDURE — 76536 US EXAM OF HEAD AND NECK: CPT | Mod: ,,, | Performed by: INTERNAL MEDICINE

## 2019-02-20 ENCOUNTER — OFFICE VISIT (OUTPATIENT)
Dept: INTERNAL MEDICINE | Facility: CLINIC | Age: 84
End: 2019-02-20
Payer: MEDICARE

## 2019-02-20 VITALS
SYSTOLIC BLOOD PRESSURE: 118 MMHG | BODY MASS INDEX: 28.16 KG/M2 | WEIGHT: 185.81 LBS | HEIGHT: 68 IN | RESPIRATION RATE: 16 BRPM | TEMPERATURE: 98 F | DIASTOLIC BLOOD PRESSURE: 60 MMHG | HEART RATE: 95 BPM | OXYGEN SATURATION: 96 %

## 2019-02-20 DIAGNOSIS — E78.00 HYPERCHOLESTEROLEMIA: ICD-10-CM

## 2019-02-20 DIAGNOSIS — R73.02 IGT (IMPAIRED GLUCOSE TOLERANCE): ICD-10-CM

## 2019-02-20 DIAGNOSIS — E89.0 POSTABLATIVE HYPOTHYROIDISM: ICD-10-CM

## 2019-02-20 DIAGNOSIS — G56.03 CARPAL TUNNEL SYNDROME, BILATERAL: ICD-10-CM

## 2019-02-20 DIAGNOSIS — Z00.00 ENCOUNTER FOR MEDICARE ANNUAL WELLNESS EXAM: Primary | ICD-10-CM

## 2019-02-20 DIAGNOSIS — N39.0 RECURRENT UTI: ICD-10-CM

## 2019-02-20 PROCEDURE — 99999 PR PBB SHADOW E&M-EST. PATIENT-LVL III: ICD-10-PCS | Mod: PBBFAC,,, | Performed by: INTERNAL MEDICINE

## 2019-02-20 PROCEDURE — 99999 PR PBB SHADOW E&M-EST. PATIENT-LVL III: CPT | Mod: PBBFAC,,, | Performed by: INTERNAL MEDICINE

## 2019-02-20 PROCEDURE — 99214 OFFICE O/P EST MOD 30 MIN: CPT | Mod: S$PBB,,, | Performed by: INTERNAL MEDICINE

## 2019-02-20 PROCEDURE — 99214 PR OFFICE/OUTPT VISIT, EST, LEVL IV, 30-39 MIN: ICD-10-PCS | Mod: S$PBB,,, | Performed by: INTERNAL MEDICINE

## 2019-02-20 PROCEDURE — 99213 OFFICE O/P EST LOW 20 MIN: CPT | Mod: PBBFAC,PN | Performed by: INTERNAL MEDICINE

## 2019-02-20 NOTE — PROGRESS NOTES
"Subjective:      Patient ID: Chloe Foster is a 89 y.o. female.    Chief Complaint: Annual Exam (yearly check up); Tingle (both hands); and Medication Refill    HPI: 89 y.o. White female, here with her daughter for an annual check up.  She is doing well, with the exception of mild numbness in both hands upon awakening,  Doing repetitive movements.  She likes to color,read the paper, hold her books, etc.  She has not lost any flexibility or strength. Does not drop anything.     She had had a recent UTI, seen in ER.    Review of Systems   Constitutional: Negative.    HENT: Negative.    Eyes: Negative.    Respiratory: Negative.    Cardiovascular: Negative.    Gastrointestinal: Negative.    Endocrine: Negative.    Genitourinary: Positive for dysuria and urgency.   Musculoskeletal: Negative.    Skin: Negative.    Allergic/Immunologic: Negative.    Neurological: Negative.    Hematological: Negative.    Psychiatric/Behavioral: Negative.        Objective:   /60 (BP Location: Left arm, Patient Position: Sitting, BP Method: X-Large (Manual))   Pulse 95   Temp 97.5 °F (36.4 °C) (Oral)   Resp 16   Ht 5' 8" (1.727 m)   Wt 84.3 kg (185 lb 12.8 oz)   SpO2 96%   BMI 28.25 kg/m²     Physical Exam   Constitutional: She is oriented to person, place, and time. She appears well-developed and well-nourished.   HENT:   Head: Normocephalic and atraumatic.   Right Ear: External ear normal.   Left Ear: External ear normal.   Nose: Nose normal.   Mouth/Throat: Oropharynx is clear and moist.   Eyes: Conjunctivae and EOM are normal. Pupils are equal, round, and reactive to light.   Neck: Normal range of motion. Neck supple.   Cardiovascular: Normal rate, regular rhythm and normal heart sounds.   Pulmonary/Chest: Effort normal and breath sounds normal.   Abdominal: Soft. Bowel sounds are normal.   Musculoskeletal:        Right wrist: Normal. She exhibits normal range of motion, no tenderness, no bony tenderness, no swelling, no " effusion, no crepitus, no deformity and no laceration.        Left wrist: Normal. She exhibits normal range of motion, no tenderness, no bony tenderness, no swelling, no effusion, no crepitus, no deformity and no laceration.   Neurological: She is alert and oriented to person, place, and time.   Skin: Skin is warm and dry.   Psychiatric: She has a normal mood and affect. Her behavior is normal. Judgment and thought content normal.   Nursing note and vitals reviewed.  Neurovascular bundles intact.    Assessment:     1. Encounter for Medicare annual wellness exam    2. Postablative hypothyroidism    3. Hypercholesterolemia    4. IGT (impaired glucose tolerance)    5. Recurrent UTI    6. Carpal tunnel syndrome, bilateral      Plan:     Encounter for Medicare annual wellness exam  -     CBC auto differential; Future; Expected date: 02/20/2019  -     Comprehensive metabolic panel; Future; Expected date: 02/20/2019  -     Lipid panel; Future; Expected date: 02/20/2019  -     TSH; Future; Expected date: 02/20/2019  -     Urinalysis; Future; Expected date: 02/20/2019    Postablative hypothyroidism  -     TSH; Future; Expected date: 02/20/2019    Hypercholesterolemia  -     Lipid panel; Future; Expected date: 02/20/2019    IGT (impaired glucose tolerance)  -     Comprehensive metabolic panel; Future; Expected date: 02/20/2019    Recurrent UTI  -     CBC auto differential; Future; Expected date: 02/20/2019  -     Urinalysis; Future; Expected date: 02/20/2019    Carpal tunnel syndrome, bilateral    Use splints, try Vit B complex, exercises ( given).

## 2019-03-07 DIAGNOSIS — E78.00 HYPERCHOLESTEROLEMIA: ICD-10-CM

## 2019-03-07 RX ORDER — PRAVASTATIN SODIUM 40 MG/1
TABLET ORAL
Qty: 90 TABLET | Refills: 1 | Status: SHIPPED | OUTPATIENT
Start: 2019-03-07 | End: 2019-08-22 | Stop reason: SDUPTHER

## 2019-03-07 NOTE — TELEPHONE ENCOUNTER
----- Message from Luisana Upton sent at 3/7/2019  8:45 AM CST -----  Patient's daughter, Viry, called.   No. [phone]   Patient needs Pravastatin 40mg, 1 daily, #90.   [facility] in [place]   Please call Viry when it is called in.

## 2019-06-06 ENCOUNTER — PATIENT MESSAGE (OUTPATIENT)
Dept: ENDOCRINOLOGY | Facility: CLINIC | Age: 84
End: 2019-06-06

## 2019-06-12 ENCOUNTER — OFFICE VISIT (OUTPATIENT)
Dept: ENDOCRINOLOGY | Facility: CLINIC | Age: 84
End: 2019-06-12
Payer: MEDICARE

## 2019-06-12 VITALS
BODY MASS INDEX: 27.66 KG/M2 | HEIGHT: 69 IN | SYSTOLIC BLOOD PRESSURE: 124 MMHG | WEIGHT: 186.75 LBS | DIASTOLIC BLOOD PRESSURE: 80 MMHG | HEART RATE: 78 BPM

## 2019-06-12 DIAGNOSIS — E04.2 MULTINODULAR GOITER (NONTOXIC): ICD-10-CM

## 2019-06-12 DIAGNOSIS — R73.02 IGT (IMPAIRED GLUCOSE TOLERANCE): ICD-10-CM

## 2019-06-12 DIAGNOSIS — E89.0 POSTABLATIVE HYPOTHYROIDISM: Primary | ICD-10-CM

## 2019-06-12 PROCEDURE — 99214 OFFICE O/P EST MOD 30 MIN: CPT | Mod: S$PBB,,, | Performed by: INTERNAL MEDICINE

## 2019-06-12 PROCEDURE — 99213 OFFICE O/P EST LOW 20 MIN: CPT | Mod: PBBFAC | Performed by: INTERNAL MEDICINE

## 2019-06-12 PROCEDURE — 99214 PR OFFICE/OUTPT VISIT, EST, LEVL IV, 30-39 MIN: ICD-10-PCS | Mod: S$PBB,,, | Performed by: INTERNAL MEDICINE

## 2019-06-12 PROCEDURE — 99999 PR PBB SHADOW E&M-EST. PATIENT-LVL III: ICD-10-PCS | Mod: PBBFAC,,, | Performed by: INTERNAL MEDICINE

## 2019-06-12 PROCEDURE — 99999 PR PBB SHADOW E&M-EST. PATIENT-LVL III: CPT | Mod: PBBFAC,,, | Performed by: INTERNAL MEDICINE

## 2019-06-12 RX ORDER — LEVOTHYROXINE SODIUM 75 UG/1
75 TABLET ORAL
Qty: 90 TABLET | Refills: 3 | Status: SHIPPED | OUTPATIENT
Start: 2019-06-12 | End: 2019-06-21 | Stop reason: SDUPTHER

## 2019-06-12 NOTE — PROGRESS NOTES
Subjective:      Patient ID: Chloe Foster is a 89 y.o. female.    Chief Complaint:  Thyroid nodule, hypothyroidism    History of Present Illness  Hypothyroidism  Ms. Foster is a very pleasant female patient with Postablative hypothyroidism here today for follow up      Last office visit in 07/2018     She is s/p I-131 for treatment of Hyperthyroidism  The patient received 24.8 mCi of I-131 on 06/14/2012      She is currently taking Levothyroxine 75 mcg daily   The patient endorses compliance with taking LT4 as prescribed. She takes it on an empty stomach with water and waits ~ 1 hour before eating or taking other medications     Patient presents for evaluation of thyroid function. Symptoms consist of denies fatigue, weight changes, heat/cold intolerance, bowel/skin changes or CVS symptoms.  The problem has been stable.  Previous thyroid studies include TSH and free thyroxine index. The hypothyroidism is due to hypothyroidism and multiple thyroid nodules.    Thyroid Nodules  Patient complains of a right neck nodule.  It has not increased in size since that time.Patient currently has symptoms of none, that have been stable. Patient denies change in size of nodule, hoarseness, anterior neck pain, dyspnea, dysphagia, tremor and palpitations.  There is no a history of radiation to the neck, head or face.  Previous work up has been TSH, FT4 and US neck.     Family history of thyroid disease:   Daughter had joey-thyroidectomy  Son total thyroidectomy  Father total thyroidectomy    Review of Systems    Objective:   Physical Exam   Constitutional: She appears well-developed and well-nourished. No distress.   HENT:   Head: Normocephalic and atraumatic.   Neck: Neck supple. No JVD present. No tracheal deviation present.   Cardiovascular: Normal rate and regular rhythm. Exam reveals no gallop and no friction rub.   No murmur heard.  Pulmonary/Chest: Effort normal and breath sounds normal. No stridor. No respiratory  distress. She has no wheezes. She has no rales.   Abdominal: Soft. Bowel sounds are normal. There is no tenderness.   Musculoskeletal: She exhibits no edema, tenderness or deformity.   Lymphadenopathy:     She has no cervical adenopathy.   Skin: She is not diaphoretic.   Psychiatric: She has a normal mood and affect. Her behavior is normal.       Lab Review:   Admission on 02/06/2019, Discharged on 02/06/2019   Component Date Value    Specimen UA 02/06/2019 Urine, Clean Catch     Color, UA 02/06/2019 Brown*    Appearance, UA 02/06/2019 Cloudy*    pH, UA 02/06/2019 6.0     Specific Gravity, UA 02/06/2019 1.025     Protein, UA 02/06/2019 2+*    Glucose, UA 02/06/2019 Negative     Ketones, UA 02/06/2019 Negative     Bilirubin (UA) 02/06/2019 1+*    Occult Blood UA 02/06/2019 3+*    Nitrite, UA 02/06/2019 Positive*    Urobilinogen, UA 02/06/2019 Negative     Leukocytes, UA 02/06/2019 3+*    RBC, UA 02/06/2019 >100*    WBC, UA 02/06/2019 60*    WBC Clumps, UA 02/06/2019 Occasional*    Bacteria 02/06/2019 Few*    Squam Epithel, UA 02/06/2019 2     Hyaline Casts, UA 02/06/2019 0     Amorphous, UA 02/06/2019 Occasional     Microscopic Comment 02/06/2019 SEE COMMENT     Urine Culture, Routine 02/06/2019                      Value:ESCHERICHIA COLI  50,000 - 99,999 cfu/ml     Admission on 12/24/2018, Discharged on 12/24/2018   Component Date Value    WBC 12/24/2018 11.28     RBC 12/24/2018 4.27     Hemoglobin 12/24/2018 13.0     Hematocrit 12/24/2018 39.5     Mean Corpuscular Volume 12/24/2018 93     Mean Corpuscular Hemoglo* 12/24/2018 30.4     Mean Corpuscular Hemoglo* 12/24/2018 32.9     RDW 12/24/2018 14.0     Platelets 12/24/2018 188     MPV 12/24/2018 10.2     Gran # (ANC) 12/24/2018 10.5*    Lymph # 12/24/2018 0.4*    Mono # 12/24/2018 0.3     Eos # 12/24/2018 0.1     Baso # 12/24/2018 0.01     Gran% 12/24/2018 92.9*    Lymph% 12/24/2018 3.8*    Mono% 12/24/2018 2.8*     Eosinophil% 12/24/2018 0.4     Basophil% 12/24/2018 0.1     Differential Method 12/24/2018 Automated     Sodium 12/24/2018 137     Potassium 12/24/2018 4.3     Chloride 12/24/2018 105     CO2 12/24/2018 25     Glucose 12/24/2018 140*    BUN, Bld 12/24/2018 26*    Creatinine 12/24/2018 0.80     Calcium 12/24/2018 8.9     Total Protein 12/24/2018 7.2     Albumin 12/24/2018 4.1     Total Bilirubin 12/24/2018 0.9     Alkaline Phosphatase 12/24/2018 100     AST 12/24/2018 193*    ALT 12/24/2018 73*    Anion Gap 12/24/2018 7*    eGFR if African American 12/24/2018 >60.0     eGFR if non  Amer* 12/24/2018 >60.0     Lipase Result 12/24/2018 399*    Troponin I 12/24/2018 <0.012     Specimen UA 12/24/2018 Urine, Clean Catch     Color, UA 12/24/2018 Yellow     Appearance, UA 12/24/2018 Clear     pH, UA 12/24/2018 6.0     Specific Gravity, UA 12/24/2018 1.010     Protein, UA 12/24/2018 Negative     Glucose, UA 12/24/2018 Negative     Ketones, UA 12/24/2018 Negative     Bilirubin (UA) 12/24/2018 Negative     Occult Blood UA 12/24/2018 2+*    Nitrite, UA 12/24/2018 Negative     Urobilinogen, UA 12/24/2018 Negative     Leukocytes, UA 12/24/2018 Negative     TSH 12/24/2018 0.643     Lactate (Lactic Acid) 12/24/2018 1.0     NT-proBNP 12/24/2018 487     RBC, UA 12/24/2018 8*    WBC, UA 12/24/2018 1     Squam Epithel, UA 12/24/2018 1     Other (U/A) 12/24/2018 Occasional*    Microscopic Comment 12/24/2018 SEE COMMENT      Lab Results   Component Value Date     12/24/2018    K 4.3 12/24/2018     12/24/2018    CO2 25 12/24/2018    BUN 26 (H) 12/24/2018    CREATININE 0.80 12/24/2018    CALCIUM 8.9 12/24/2018     Lab Results   Component Value Date    TROPONINI <0.012 12/24/2018     Lab Results   Component Value Date    WBC 11.28 12/24/2018    HGB 13.0 12/24/2018    HCT 39.5 12/24/2018    MCV 93 12/24/2018     12/24/2018     Lab Results   Component Value Date    CHOL 157  02/21/2017    TRIG 132 02/21/2017    HDL 41 02/21/2017       Assessment:     1. Postablative hypothyroidism. This diagnosis was discussed and reviewed with the patient including the advantages of drug therapy.  2. Multinodular goiter (nontoxic)  3. IGT    Plan:     1. Labs re- schedule:  TSH. Neck US is due on 2020   2. Re-fill thyroid medicine  3. The risks and benefits of my recommendations, as well as other treatment options were discussed with the patient today. Questions were answered.  4. Last A1c in normal range    I have reviewed and concur with the resident's history, physical, assessment, and plan.  I have personally interviewed the patient and all questions were answered.     Chapo Awan M.D. Staff Endocrinology

## 2019-06-13 ENCOUNTER — TELEPHONE (OUTPATIENT)
Dept: FAMILY MEDICINE | Facility: CLINIC | Age: 84
End: 2019-06-13

## 2019-06-13 ENCOUNTER — TELEPHONE (OUTPATIENT)
Dept: INTERNAL MEDICINE | Facility: CLINIC | Age: 84
End: 2019-06-13

## 2019-06-13 NOTE — TELEPHONE ENCOUNTER
----- Message from Sena Umana sent at 6/13/2019  2:07 PM CDT -----  Contact: Viry Higginbotham - [phone]  Patient daughter is requesting a call back regarding her moms lab results. Please advise

## 2019-06-13 NOTE — TELEPHONE ENCOUNTER
----- Message from [facility] KRYSTAL Pérez MD sent at 6/13/2019 12:20 PM CDT -----  Plz call pt and ask her to drink at least 4-5 glasses of water daily for the next 4 days.  TSA

## 2019-06-13 NOTE — TELEPHONE ENCOUNTER
Spoke with patient's daughter Viry. Viry was calling in to discuss patient's lab results, informed Viry that unfortunately, I was unable to discuss any of the patient's information with her due to her not being the proxy. Viry verbalizes understanding.

## 2019-06-21 DIAGNOSIS — E89.0 POSTABLATIVE HYPOTHYROIDISM: ICD-10-CM

## 2019-06-21 RX ORDER — LEVOTHYROXINE SODIUM 75 UG/1
TABLET ORAL
Qty: 90 TABLET | Refills: 0 | Status: SHIPPED | OUTPATIENT
Start: 2019-06-21 | End: 2020-05-25 | Stop reason: SDUPTHER

## 2019-07-02 ENCOUNTER — OFFICE VISIT (OUTPATIENT)
Dept: INTERNAL MEDICINE | Facility: CLINIC | Age: 84
End: 2019-07-02
Payer: MEDICARE

## 2019-07-02 VITALS
OXYGEN SATURATION: 95 % | HEART RATE: 84 BPM | SYSTOLIC BLOOD PRESSURE: 112 MMHG | BODY MASS INDEX: 28.01 KG/M2 | TEMPERATURE: 98 F | HEIGHT: 69 IN | WEIGHT: 189.13 LBS | RESPIRATION RATE: 18 BRPM | DIASTOLIC BLOOD PRESSURE: 60 MMHG

## 2019-07-02 DIAGNOSIS — G31.84 MILD COGNITIVE IMPAIRMENT WITH MEMORY LOSS: Primary | ICD-10-CM

## 2019-07-02 PROCEDURE — 99214 PR OFFICE/OUTPT VISIT, EST, LEVL IV, 30-39 MIN: ICD-10-PCS | Mod: S$PBB,,, | Performed by: INTERNAL MEDICINE

## 2019-07-02 PROCEDURE — 99999 PR PBB SHADOW E&M-EST. PATIENT-LVL IV: CPT | Mod: PBBFAC,,, | Performed by: INTERNAL MEDICINE

## 2019-07-02 PROCEDURE — 99999 PR PBB SHADOW E&M-EST. PATIENT-LVL IV: ICD-10-PCS | Mod: PBBFAC,,, | Performed by: INTERNAL MEDICINE

## 2019-07-02 PROCEDURE — 99214 OFFICE O/P EST MOD 30 MIN: CPT | Mod: S$PBB,,, | Performed by: INTERNAL MEDICINE

## 2019-07-02 PROCEDURE — 99214 OFFICE O/P EST MOD 30 MIN: CPT | Mod: PBBFAC,PN | Performed by: INTERNAL MEDICINE

## 2019-07-02 RX ORDER — DONEPEZIL HYDROCHLORIDE 5 MG/1
5 TABLET, FILM COATED ORAL NIGHTLY
Qty: 30 TABLET | Refills: 11 | Status: SHIPPED | OUTPATIENT
Start: 2019-07-02 | End: 2020-03-06 | Stop reason: DRUGHIGH

## 2019-07-06 NOTE — PROGRESS NOTES
INTERNAL MEDICINE    Patient Active Problem List   Diagnosis    IGT (impaired glucose tolerance)    Postablative hypothyroidism    Hypercholesterolemia    Nontoxic multinodular goiter    Hearing loss, sensorineural    Recurrent UTI    Bladder cystocele       CC:   Chief Complaint   Patient presents with    Follow-up     Lab results, pt daughters states she wants to talk about her white blood cell count      Shoulder Pain     pt states she fell 3 weeks ago and since her shoulder is still bothering her, she can not lift her arm all the way up she feels sore    She tried to pivot on one foot, lost her balance and fell down. No LOC,seizure disorder. However, this is not the first time she has fallen at home.  Her family has now tried to make it free of impediments.    Did not seek medical care.    SUBJECTIVE:  Chloe Foster   is a 89 y.o. female, here with her daughter.  I have reviewed all labs with both of them.    Shoulder Pain    The pain is present in the right shoulder. This is a new problem. The current episode started 1 to 4 weeks ago. There has been a history of trauma. The problem occurs intermittently. The problem has been gradually improving. The quality of the pain is described as aching. The pain is at a severity of 3/10. The pain is mild. Associated symptoms include stiffness. Pertinent negatives include no fever, headaches, inability to bear weight, numbness or tingling. The symptoms are aggravated by activity. She has tried nothing for the symptoms. The treatment provided no relief.   Fall   Pertinent negatives include no fever, headaches, numbness or tingling.        ROS: Review of Systems   Constitutional: Negative for fever.   HENT: Negative.    Eyes: Negative.    Cardiovascular: Negative.    Gastrointestinal: Negative.    Genitourinary: Negative.    Musculoskeletal: Positive for stiffness.   Skin: Negative.    Neurological: Negative for tingling, numbness and headaches.    Psychiatric/Behavioral: Positive for confusion and decreased concentration. Negative for dysphoric mood, hallucinations, self-injury, sleep disturbance and suicidal ideas. The patient is not nervous/anxious and is not hyperactive.        Past Medical History:   Diagnosis Date    Anxiety     Black eye 5/6/2013    Dyslipidemia     Hyperthyroidism     IGT (impaired glucose tolerance)     Mitral valve prolapse     Nontoxic multinodular goiter 2/18/2013    Primary hypothyroidism 5/18/2015    Shingles     left side of chest and back    Thyroid nodule 2/18/2013    Urinary tract infection        Past Surgical History:   Procedure Laterality Date    CATARACT EXTRACTION BILATERAL W/ ANTERIOR VITRECTOMY      DILATION AND CURETTAGE OF UTERUS      x2       Family History   Problem Relation Age of Onset    Thyroid disease Father     Thyroid disease Son     Thyroid disease Daughter     Diabetes Neg Hx     Breast cancer Neg Hx     Colon cancer Neg Hx     Ovarian cancer Neg Hx        Social History     Tobacco Use    Smoking status: Never Smoker    Smokeless tobacco: Never Used   Substance Use Topics    Alcohol use: No     Alcohol/week: 0.0 oz    Drug use: No       Social History     Social History Narrative    Her  passed away in March 2014. She lives with her  and has sitters and a son who helps. She has four children two of whom are twins. Her son is running the family business. Her children are all educated. She worked doing  for the [employer]. She has an education degree and is a retired . She started a travel agency for 15 years. She is a non-smoker and denies alcohol or substance abuse.                        ALLERGIES:   Review of patient's allergies indicates:   Allergen Reactions    Bactrim [sulfamethoxazole-trimethoprim] Other (See Comments)     She just felt bad    Namenda [memantine]        MEDS:   Current Outpatient Medications:      "ANTIOX#10/OM3/DHA/EPA/LUT/ZEAX (I-CAPS ORAL), Take 2 capsules by mouth once daily., Disp: , Rfl:     calcium-vitamin D (OSCAL) 250 (625)-125 mg-unit per tablet, Take 1 tablet by mouth 2 (two) times daily., Disp: , Rfl:     levothyroxine (SYNTHROID) 75 MCG tablet, TAKE ONE TABLET EVERY MORNING BEFORE BREAKFAST, Disp: 90 tablet, Rfl: 0    multivit-iron-FA-calcium &mins (ONE-A-DAY WOMENS FORMULA) 18 mg iron-400 mcg-500 mg Ca Tab, Take 1 tablet by mouth once daily. , Disp: , Rfl:     pravastatin (PRAVACHOL) 40 MG tablet, TAKE 1 TABLET BY MOUTH ONCE DAILY FOR CHOLESTEROL., Disp: 90 tablet, Rfl: 1    sertraline (ZOLOFT) 100 MG tablet, Take 11/2 tabs ( 150) daily, Disp: 135 tablet, Rfl: 3    donepezil (ARICEPT) 5 MG tablet, Take 1 tablet (5 mg total) by mouth every evening., Disp: 30 tablet, Rfl: 11    OBJECTIVE:   Vitals:    07/02/19 1419   BP: 112/60   BP Location: Left arm   Patient Position: Sitting   BP Method: X-Large (Manual)   Pulse: 84   Resp: 18   Temp: 98.4 °F (36.9 °C)   TempSrc: Oral   SpO2: 95%   Weight: 85.8 kg (189 lb 1.6 oz)   Height: 5' 9" (1.753 m)     Body mass index is 27.93 kg/m².    Physical Exam   Constitutional: She appears well-developed and well-nourished.   HENT:   Head: Normocephalic and atraumatic.   Right Ear: External ear normal.   Left Ear: External ear normal.   Nose: Nose normal.   Mouth/Throat: Oropharynx is clear and moist.   Eyes: Conjunctivae and EOM are normal.   Neck: Neck supple. No JVD present.   Cardiovascular: Normal rate, regular rhythm and normal heart sounds.   Pulmonary/Chest: Effort normal and breath sounds normal.   Abdominal: Soft. Bowel sounds are normal.   Musculoskeletal:        Right shoulder: She exhibits decreased range of motion. She exhibits no tenderness, no swelling, no effusion, no pain, no spasm and normal pulse.   Lymphadenopathy:     She has no cervical adenopathy.   Neurological: She is alert. She displays normal reflexes. No cranial nerve deficit or " sensory deficit. She exhibits normal muscle tone. Coordination normal.   Skin: Skin is warm and dry.   Psychiatric: She has a normal mood and affect. Her speech is normal and behavior is normal. Cognition and memory are impaired. She exhibits abnormal recent memory.   Nursing note and vitals reviewed.        PERTINENT RESULTS:   CBC:  Recent Labs   Lab Result Units 06/13/19  1004   WBC K/uL 7.46   RBC M/uL 4.23   Hemoglobin g/dL 13.0   Hematocrit % 39.0   Platelets K/uL 216   Mean Corpuscular Volume fL 92   Mean Corpuscular Hemoglobin pg 30.7   Mean Corpuscular Hemoglobin Conc g/dL 33.3     CMP:  Recent Labs   Lab Result Units 06/13/19  1004   Glucose mg/dL 101   Calcium mg/dL 9.9   Albumin g/dL 4.5   Total Protein g/dL 7.7   Sodium mmol/L 140   Potassium mmol/L 4.8   CO2 mmol/L 24   Chloride mmol/L 105   BUN, Bld mg/dL 31*   Alkaline Phosphatase U/L 83   ALT U/L 17   AST U/L 28   Total Bilirubin mg/dL 0.6     URINALYSIS:  Recent Labs   Lab Result Units 06/13/19  1046   Color, UA  Yellow   Specific Gravity, UA  1.025   pH, UA  6.0   Protein, UA  Trace*   Bacteria /hpf Many*   Nitrite, UA  Negative   Leukocytes, UA  2+*   Urobilinogen, UA EU/dL Negative   Hyaline Casts, UA /lpf 20*      LIPIDS:  Recent Labs   Lab Result Units 06/13/19  1004   TSH uIU/mL 1.850   HDL mg/dL 51   Cholesterol mg/dL 171   Triglycerides mg/dL 93   LDL Cholesterol mg/dL 101.4   Hdl/Cholesterol Ratio % 29.8   Non-HDL Cholesterol mg/dL 120   Total Cholesterol/HDL Ratio  3.4             ASSESSMENT:  Problem List Items Addressed This Visit     None      Visit Diagnoses     Mild cognitive impairment with memory loss    -  Primary    Relevant Medications    donepezil (ARICEPT) 5 MG tablet      She had been seen years ago by Dr. Devine, who had diagnosed impaired cognitive disorder, but did not place her on any medications.  Agrees to begin therapy and follow up with Dr. Devine for serial evaluation.    PLAN:   Orders Placed This Encounter     donepezil (ARICEPT) 5 MG tablet     No orders of the defined types were placed in this encounter.      Follow-up with me in 3months.   Dr. Kimberly Pérez  Internal Medicine

## 2019-08-22 DIAGNOSIS — E78.00 HYPERCHOLESTEROLEMIA: ICD-10-CM

## 2019-08-22 RX ORDER — PRAVASTATIN SODIUM 40 MG/1
TABLET ORAL
Qty: 90 TABLET | Refills: 1 | Status: SHIPPED | OUTPATIENT
Start: 2019-08-22 | End: 2020-02-19 | Stop reason: SDUPTHER

## 2019-09-11 DIAGNOSIS — R53.83 FATIGUE, UNSPECIFIED TYPE: ICD-10-CM

## 2019-09-11 RX ORDER — SERTRALINE HYDROCHLORIDE 100 MG/1
TABLET, FILM COATED ORAL
Qty: 90 TABLET | Refills: 1 | Status: SHIPPED | OUTPATIENT
Start: 2019-09-11 | End: 2019-09-12 | Stop reason: SDUPTHER

## 2019-09-11 NOTE — TELEPHONE ENCOUNTER
----- Message from Ericka Rachel sent at 9/11/2019  1:39 PM CDT -----  Contact: Viry (daughter)/[phone]  Type:  RX Refill Request    Who Called:  Viry  Refill or New Rx: refill  RX Name and Strength: sertraline (ZOLOFT) 100 MG tablet  How is the patient currently taking it? (ex. 1XDay): 1Xday  Is this a 30 day or 90 day RX: 30 day  Preferred Pharmacy with phone number: [facility] #[id number] - [place] - [address] AT [facility]  Local or Mail Order: local  Ordering Provider: Elisa  Would the patient rather a call back or a response via [facility]?  Call back  Best Call Back Number: [phone]  Additional Information:  She only has one pill left

## 2019-09-12 DIAGNOSIS — R53.83 FATIGUE, UNSPECIFIED TYPE: ICD-10-CM

## 2019-09-12 RX ORDER — SERTRALINE HYDROCHLORIDE 100 MG/1
TABLET, FILM COATED ORAL
Qty: 135 TABLET | Refills: 0 | Status: SHIPPED | OUTPATIENT
Start: 2019-09-12 | End: 2020-04-15

## 2019-11-14 ENCOUNTER — OFFICE VISIT (OUTPATIENT)
Dept: INTERNAL MEDICINE | Facility: CLINIC | Age: 84
End: 2019-11-14
Payer: MEDICARE

## 2019-11-14 VITALS
TEMPERATURE: 98 F | HEART RATE: 89 BPM | WEIGHT: 190 LBS | OXYGEN SATURATION: 96 % | HEIGHT: 69 IN | DIASTOLIC BLOOD PRESSURE: 70 MMHG | RESPIRATION RATE: 16 BRPM | BODY MASS INDEX: 28.14 KG/M2 | SYSTOLIC BLOOD PRESSURE: 110 MMHG

## 2019-11-14 DIAGNOSIS — H90.3 SENSORINEURAL HEARING LOSS (SNHL) OF BOTH EARS: ICD-10-CM

## 2019-11-14 DIAGNOSIS — R41.3 SHORT-TERM MEMORY LOSS: ICD-10-CM

## 2019-11-14 DIAGNOSIS — E04.2 NONTOXIC MULTINODULAR GOITER: Primary | ICD-10-CM

## 2019-11-14 DIAGNOSIS — R73.02 IGT (IMPAIRED GLUCOSE TOLERANCE): ICD-10-CM

## 2019-11-14 DIAGNOSIS — E78.00 HYPERCHOLESTEROLEMIA: ICD-10-CM

## 2019-11-14 PROCEDURE — 99214 OFFICE O/P EST MOD 30 MIN: CPT | Mod: S$PBB,,, | Performed by: INTERNAL MEDICINE

## 2019-11-14 PROCEDURE — 99213 OFFICE O/P EST LOW 20 MIN: CPT | Mod: PBBFAC,PN | Performed by: INTERNAL MEDICINE

## 2019-11-14 PROCEDURE — 99214 PR OFFICE/OUTPT VISIT, EST, LEVL IV, 30-39 MIN: ICD-10-PCS | Mod: S$PBB,,, | Performed by: INTERNAL MEDICINE

## 2019-11-14 PROCEDURE — 99999 PR PBB SHADOW E&M-EST. PATIENT-LVL III: ICD-10-PCS | Mod: PBBFAC,,, | Performed by: INTERNAL MEDICINE

## 2019-11-14 PROCEDURE — 99999 PR PBB SHADOW E&M-EST. PATIENT-LVL III: CPT | Mod: PBBFAC,,, | Performed by: INTERNAL MEDICINE

## 2019-11-14 NOTE — PROGRESS NOTES
INTERNAL MEDICINE    Patient Active Problem List   Diagnosis    IGT (impaired glucose tolerance)    Postablative hypothyroidism    Hypercholesterolemia    Nontoxic multinodular goiter    Hearing loss, sensorineural    Recurrent UTI    Bladder cystocele       CC:   Chief Complaint   Patient presents with    Follow-up     4 month follow up    Memory Loss    Medication Refill    Fall     2 falls in 2 weeks       SUBJECTIVE:  Chloe Foster   is a 89 y.o. female  HPI   89y/oWF here with her daughter. She has a very attentive family, they check on her, walk with her,market for her,etc.  She has night time sitters.  She is supposed to go to silver sneakers, but often sleeps late.  Has had 2 mechanical falls, tripped on rugs, never with LOC. Rugs have been rolled up.  Daughter asks if low dose aricept can be increased since she has been able to tolerate this so far.  Pt has no new complaints, and denies any old complaints.    ROS: Review of Systems   Constitutional: Negative.    HENT: Negative.    Eyes: Negative.    Respiratory: Negative.    Cardiovascular: Negative.    Gastrointestinal: Negative.    Endocrine: Negative.    Genitourinary: Negative.    Musculoskeletal: Negative.    Skin: Negative.    Neurological: Negative.    Hematological: Negative.    Psychiatric/Behavioral: Negative.        Past Medical History:   Diagnosis Date    Anxiety     Black eye 5/6/2013    Dyslipidemia     Hyperthyroidism     IGT (impaired glucose tolerance)     Mitral valve prolapse     Nontoxic multinodular goiter 2/18/2013    Primary hypothyroidism 5/18/2015    Shingles     left side of chest and back    Thyroid nodule 2/18/2013    Urinary tract infection        Past Surgical History:   Procedure Laterality Date    CATARACT EXTRACTION BILATERAL W/ ANTERIOR VITRECTOMY      DILATION AND CURETTAGE OF UTERUS      x2       Family History   Problem Relation Age of Onset    Thyroid disease Father     Thyroid disease Son      Thyroid disease Daughter     Diabetes Neg Hx     Breast cancer Neg Hx     Colon cancer Neg Hx     Ovarian cancer Neg Hx        Social History     Tobacco Use    Smoking status: Never Smoker    Smokeless tobacco: Never Used   Substance Use Topics    Alcohol use: No     Alcohol/week: 0.0 standard drinks    Drug use: No       Social History     Social History Narrative    Her  passed away in March 2014. She lives with her  and has sitters and a son who helps. She has four children two of whom are twins. Her son is running the family business. Her children are all educated. She worked doing  for the [employer]. She has an education degree and is a retired . She started a travel agency for 15 years. She is a non-smoker and denies alcohol or substance abuse.                        ALLERGIES:   Review of patient's allergies indicates:   Allergen Reactions    Bactrim [sulfamethoxazole-trimethoprim] Other (See Comments)     She just felt bad    Namenda [memantine]        MEDS:   Current Outpatient Medications:     ANTIOX#10/OM3/DHA/EPA/LUT/ZEAX (I-CAPS ORAL), Take 2 capsules by mouth once daily., Disp: , Rfl:     calcium-vitamin D (OSCAL) 250 (625)-125 mg-unit per tablet, Take 1 tablet by mouth 2 (two) times daily., Disp: , Rfl:     donepezil (ARICEPT) 5 MG tablet, Take 1 tablet (5 mg total) by mouth every evening., Disp: 30 tablet, Rfl: 11    FLUZONE HIGH-DOSE 2019-20, PF, 180 mcg/0.5 mL Syrg, , Disp: , Rfl:     levothyroxine (SYNTHROID) 75 MCG tablet, TAKE ONE TABLET EVERY MORNING BEFORE BREAKFAST, Disp: 90 tablet, Rfl: 0    multivit-iron-FA-calcium &mins (ONE-A-DAY WOMENS FORMULA) 18 mg iron-400 mcg-500 mg Ca Tab, Take 1 tablet by mouth once daily. , Disp: , Rfl:     pravastatin (PRAVACHOL) 40 MG tablet, TAKE 1 TABLET BY MOUTH ONCE DAILY FOR CHOLESTEROL., Disp: 90 tablet, Rfl: 1    sertraline (ZOLOFT) 100 MG tablet, TAKE 1 AND 1/2 TABLETS BY MOUTH EVERY  "DAY, Disp: 135 tablet, Rfl: 0    OBJECTIVE:   Vitals:    11/14/19 1532   BP: 110/70   BP Location: Left arm   Patient Position: Sitting   BP Method: X-Large (Manual)   Pulse: 89   Resp: 16   Temp: 97.8 °F (36.6 °C)   TempSrc: Oral   SpO2: 96%   Weight: 86.2 kg (190 lb)   Height: 5' 9" (1.753 m)     Body mass index is 28.06 kg/m².    Physical Exam   Constitutional: She appears well-developed and well-nourished.   HENT:   Head: Normocephalic and atraumatic.   Right Ear: External ear normal.   Left Ear: External ear normal.   Nose: Nose normal.   Mouth/Throat: Oropharynx is clear and moist.   Eyes: Pupils are equal, round, and reactive to light. Conjunctivae and EOM are normal.   Neck: Normal range of motion. Neck supple.   Cardiovascular: Normal rate, regular rhythm and normal heart sounds.   Pulmonary/Chest: Effort normal and breath sounds normal.   Abdominal: Soft. Bowel sounds are normal.   Musculoskeletal: Normal range of motion.   Neurological: She is alert.   oriented to self, place, but short term memory lost   Skin: Skin is warm and dry.   Psychiatric: She has a normal mood and affect. Her behavior is normal.   Nursing note and vitals reviewed.        PERTINENT RESULTS:   CBC:  No results for input(s): WBC, RBC, HGB, HCT, PLT, MCV, MCH, MCHC in the last 2160 hours.  CMP:  No results for input(s): GLU, CALCIUM, ALBUMIN, PROT, NA, K, CO2, CL, BUN, ALKPHOS, ALT, AST, BILITOT in the last 2160 hours.    Invalid input(s): CREATININ  URINALYSIS:  No results for input(s): COLORU, CLARITYU, SPECGRAV, PHUR, PROTEINUA, GLUCOSEU, BILIRUBINCON, BLOODU, WBCU, RBCU, BACTERIA, MUCUS, NITRITE, LEUKOCYTESUR, UROBILINOGEN, HYALINECASTS in the last 2160 hours.   LIPIDS:  No results for input(s): TSH, HDL, CHOL, TRIG, LDLCALC, CHOLHDL, NONHDLCHOL, TOTALCHOLEST in the last 2160 hours.          ASSESSMENT:  Problem List Items Addressed This Visit        ENT    Hearing loss, sensorineural       Cardiac/Vascular    " Hypercholesterolemia    Relevant Orders    Lipid panel       Endocrine    IGT (impaired glucose tolerance)    Relevant Orders    CBC auto differential    Comprehensive metabolic panel    Urinalysis    Nontoxic multinodular goiter - Primary    Relevant Orders    TSH      Other Visit Diagnoses     Short-term memory loss            Increase aricept to 10mg, if she tolerates this, will send in a new script. If not, stay at 5mg.      PLAN:   Orders Placed This Encounter    CBC auto differential    Comprehensive metabolic panel    Lipid panel    Urinalysis    TSH     Orders Placed This Encounter   Procedures    CBC auto differential     Standing Status:   Future     Standing Expiration Date:   11/13/2020    Comprehensive metabolic panel     Standing Status:   Future     Standing Expiration Date:   11/13/2020    Lipid panel     Standing Status:   Future     Standing Expiration Date:   11/13/2020    Urinalysis     Standing Status:   Future     Standing Expiration Date:   11/13/2020    TSH     Standing Status:   Future     Standing Expiration Date:   11/13/2020       Follow-up with me in 1month if labs askew, otherwise 3 months..   Dr. Kimberly Pérez  Internal Medicine

## 2019-12-06 ENCOUNTER — TELEPHONE (OUTPATIENT)
Dept: FAMILY MEDICINE | Facility: CLINIC | Age: 84
End: 2019-12-06

## 2019-12-06 NOTE — TELEPHONE ENCOUNTER
No answer from daughter, called [facility] with increased dosage per Dr. Pérez notes

## 2019-12-06 NOTE — TELEPHONE ENCOUNTER
----- Message from Ericka Rachel sent at 12/6/2019  8:51 AM CST -----  Contact: Viry (daughter)/[phone]  Viry called to speak with your office about getting a new prescription for the increased dosage that the doctor told her mother to take now as she is out of medication and the pharmacy will not refill it this soon with the old dosage instructions.    Please call [phone] to discuss today.    donepezil (ARICEPT) 5 MG tablet

## 2019-12-23 ENCOUNTER — OFFICE VISIT (OUTPATIENT)
Dept: INTERNAL MEDICINE | Facility: CLINIC | Age: 84
End: 2019-12-23
Payer: MEDICARE

## 2019-12-23 VITALS
TEMPERATURE: 98 F | SYSTOLIC BLOOD PRESSURE: 116 MMHG | HEIGHT: 69 IN | WEIGHT: 186.13 LBS | DIASTOLIC BLOOD PRESSURE: 60 MMHG | BODY MASS INDEX: 27.57 KG/M2 | RESPIRATION RATE: 16 BRPM | HEART RATE: 86 BPM | OXYGEN SATURATION: 96 %

## 2019-12-23 DIAGNOSIS — J40 BRONCHITIS WITH TRACHEITIS: Primary | ICD-10-CM

## 2019-12-23 PROCEDURE — 99213 OFFICE O/P EST LOW 20 MIN: CPT | Mod: S$PBB,,, | Performed by: INTERNAL MEDICINE

## 2019-12-23 PROCEDURE — 1126F PR PAIN SEVERITY QUANTIFIED, NO PAIN PRESENT: ICD-10-PCS | Mod: ,,, | Performed by: INTERNAL MEDICINE

## 2019-12-23 PROCEDURE — 1159F PR MEDICATION LIST DOCUMENTED IN MEDICAL RECORD: ICD-10-PCS | Mod: ,,, | Performed by: INTERNAL MEDICINE

## 2019-12-23 PROCEDURE — 99999 PR PBB SHADOW E&M-EST. PATIENT-LVL III: ICD-10-PCS | Mod: PBBFAC,,, | Performed by: INTERNAL MEDICINE

## 2019-12-23 PROCEDURE — 99213 OFFICE O/P EST LOW 20 MIN: CPT | Mod: PBBFAC,PN | Performed by: INTERNAL MEDICINE

## 2019-12-23 PROCEDURE — 1159F MED LIST DOCD IN RCRD: CPT | Mod: ,,, | Performed by: INTERNAL MEDICINE

## 2019-12-23 PROCEDURE — 1126F AMNT PAIN NOTED NONE PRSNT: CPT | Mod: ,,, | Performed by: INTERNAL MEDICINE

## 2019-12-23 PROCEDURE — 99213 PR OFFICE/OUTPT VISIT, EST, LEVL III, 20-29 MIN: ICD-10-PCS | Mod: S$PBB,,, | Performed by: INTERNAL MEDICINE

## 2019-12-23 PROCEDURE — 99999 PR PBB SHADOW E&M-EST. PATIENT-LVL III: CPT | Mod: PBBFAC,,, | Performed by: INTERNAL MEDICINE

## 2019-12-23 RX ORDER — CEFUROXIME AXETIL 500 MG/1
500 TABLET ORAL 2 TIMES DAILY
Qty: 20 TABLET | Refills: 0 | Status: SHIPPED | OUTPATIENT
Start: 2019-12-23 | End: 2020-03-06

## 2019-12-23 NOTE — PROGRESS NOTES
"INTERNAL MEDICINE    Patient Active Problem List   Diagnosis    IGT (impaired glucose tolerance)    Postablative hypothyroidism    Hypercholesterolemia    Nontoxic multinodular goiter    Hearing loss, sensorineural    Recurrent UTI    Bladder cystocele       CC:   Chief Complaint   Patient presents with    Cough     x 5 days       SUBJECTIVE:  Chloe Foster   is a 89 y.o. female  HPI   89y/oWF, fragile, increasing memory loss, with less activity, has had a persistent,non productive cough of 5 days. Denies constitutional symptoms, but here with daughter, who states she has become more " droopy" with less mental concentration.  Pt denies fever,chills,SOB,wheezing,sore throat, nose running or post nasal drip,ear pain.  No CP,palpitations,LOC,syncope.  No increase in arthritic issues.      ROS: Review of Systems   Constitutional: Negative for activity change, chills, diaphoresis, fatigue and fever.   HENT: Negative.    Eyes: Negative.    Respiratory: Positive for cough. Negative for apnea, choking, chest tightness, shortness of breath, wheezing and stridor.    Cardiovascular: Negative for chest pain and palpitations.   Gastrointestinal: Negative.  Negative for abdominal pain, constipation, diarrhea, nausea and vomiting.   Genitourinary: Negative.    Musculoskeletal: Negative.    Skin: Negative.    Neurological: Negative for dizziness, light-headedness and headaches.   Hematological: Negative.    Psychiatric/Behavioral: Positive for decreased concentration.       Past Medical History:   Diagnosis Date    Anxiety     Black eye 5/6/2013    Dyslipidemia     Hyperthyroidism     IGT (impaired glucose tolerance)     Mitral valve prolapse     Nontoxic multinodular goiter 2/18/2013    Primary hypothyroidism 5/18/2015    Shingles     left side of chest and back    Thyroid nodule 2/18/2013    Urinary tract infection        Past Surgical History:   Procedure Laterality Date    CATARACT EXTRACTION BILATERAL " W/ ANTERIOR VITRECTOMY      DILATION AND CURETTAGE OF UTERUS      x2       Family History   Problem Relation Age of Onset    Thyroid disease Father     Thyroid disease Son     Thyroid disease Daughter     Diabetes Neg Hx     Breast cancer Neg Hx     Colon cancer Neg Hx     Ovarian cancer Neg Hx        Social History     Tobacco Use    Smoking status: Never Smoker    Smokeless tobacco: Never Used   Substance Use Topics    Alcohol use: No     Alcohol/week: 0.0 standard drinks    Drug use: No       Social History     Social History Narrative    Her  passed away in March 2014. She lives with her  and has sitters and a son who helps. She has four children two of whom are twins. Her son is running the family business. Her children are all educated. She worked doing  for the [employer]. She has an education degree and is a retired . She started a travel agency for 15 years. She is a non-smoker and denies alcohol or substance abuse.                        ALLERGIES:   Review of patient's allergies indicates:   Allergen Reactions    Bactrim [sulfamethoxazole-trimethoprim] Other (See Comments)     She just felt bad    Namenda [memantine]        MEDS:   Current Outpatient Medications:     ANTIOX#10/OM3/DHA/EPA/LUT/ZEAX (I-CAPS ORAL), Take 2 capsules by mouth once daily., Disp: , Rfl:     calcium-vitamin D (OSCAL) 250 (625)-125 mg-unit per tablet, Take 1 tablet by mouth 2 (two) times daily., Disp: , Rfl:     donepezil (ARICEPT) 5 MG tablet, Take 1 tablet (5 mg total) by mouth every evening., Disp: 30 tablet, Rfl: 11    FLUZONE HIGH-DOSE 2019-20, PF, 180 mcg/0.5 mL Syrg, , Disp: , Rfl:     levothyroxine (SYNTHROID) 75 MCG tablet, TAKE ONE TABLET EVERY MORNING BEFORE BREAKFAST, Disp: 90 tablet, Rfl: 0    multivit-iron-FA-calcium &mins (ONE-A-DAY WOMENS FORMULA) 18 mg iron-400 mcg-500 mg Ca Tab, Take 1 tablet by mouth once daily. , Disp: , Rfl:     pravastatin  "(PRAVACHOL) 40 MG tablet, TAKE 1 TABLET BY MOUTH ONCE DAILY FOR CHOLESTEROL., Disp: 90 tablet, Rfl: 1    sertraline (ZOLOFT) 100 MG tablet, TAKE 1 AND 1/2 TABLETS BY MOUTH EVERY DAY, Disp: 135 tablet, Rfl: 0    cefUROXime (CEFTIN) 500 MG tablet, Take 1 tablet (500 mg total) by mouth 2 (two) times daily., Disp: 20 tablet, Rfl: 0    OBJECTIVE:   Vitals:    12/23/19 1303   BP: 116/60   BP Location: Left arm   Patient Position: Sitting   BP Method: X-Large (Manual)   Pulse: 86   Resp: 16   Temp: 97.5 °F (36.4 °C)   TempSrc: Oral   SpO2: 96%   Weight: 84.4 kg (186 lb 1.6 oz)   Height: 5' 9" (1.753 m)     Body mass index is 27.48 kg/m².    Physical Exam   Constitutional: She is oriented to person, place, and time. She appears well-developed and well-nourished.   HENT:   Head: Normocephalic and atraumatic.   Right Ear: Tympanic membrane, external ear and ear canal normal.   Left Ear: Tympanic membrane, external ear and ear canal normal.   Nose: Nose normal. No mucosal edema.   Mouth/Throat: Uvula is midline, oropharynx is clear and moist and mucous membranes are normal. No posterior oropharyngeal edema or posterior oropharyngeal erythema.   Eyes: Conjunctivae and EOM are normal.   Neck: Normal range of motion.   Cardiovascular: Normal rate, regular rhythm and normal heart sounds.   Pulmonary/Chest: Effort normal and breath sounds normal. No stridor. She has no wheezes. She has no rales. She exhibits no tenderness.   Coarse BS bilat, R>L   Abdominal: Soft. Bowel sounds are normal.   Musculoskeletal: Normal range of motion.   Neurological: She is alert and oriented to person, place, and time.   Skin: Skin is warm and dry.   Psychiatric: She has a normal mood and affect. Her behavior is normal.   Nursing note and vitals reviewed.        PERTINENT RESULTS:   CBC:  Recent Labs   Lab Result Units 11/19/19  0930 12/05/19  1940   WBC K/uL 6.58 9.92   RBC M/uL 4.14 3.83*   Hemoglobin g/dL 12.4 11.9*   Hematocrit % 38.8 35.5* "   Platelets K/uL 202 210   Mean Corpuscular Volume fL 94 93   Mean Corpuscular Hemoglobin pg 30.0 31.1*   Mean Corpuscular Hemoglobin Conc g/dL 32.0 33.5     CMP:  Recent Labs   Lab Result Units 11/19/19  0930 12/05/19 1940   Glucose mg/dL 104 170*   Calcium mg/dL 9.4 9.0   Albumin g/dL 4.0 4.3   Total Protein g/dL 6.9 7.4   Sodium mmol/L 141 141   Potassium mmol/L 4.3 4.0   CO2 mmol/L 25 26   Chloride mmol/L 106 105   BUN, Bld mg/dL 25* 33*   Alkaline Phosphatase U/L 84 98   ALT U/L 16 18   AST U/L 28 27   Total Bilirubin mg/dL 0.5 0.2     URINALYSIS:  Recent Labs   Lab Result Units 11/19/19  0949 12/05/19  1935   Color, UA  Yellow Yellow   Specific Gravity, UA  1.025 1.025   pH, UA  6.0 6.0   Protein, UA  Negative Negative   Bacteria /hpf Occasional  --    Nitrite, UA  Negative Negative   Leukocytes, UA  2+* Negative   Urobilinogen, UA EU/dL Negative Negative   Hyaline Casts, UA /lpf 7*  --       LIPIDS:  Recent Labs   Lab Result Units 11/19/19  0930 12/05/19 1940   TSH uIU/mL 5.870* 3.140   HDL mg/dL 42  --    Cholesterol mg/dL 147  --    Triglycerides mg/dL 127  --    LDL Cholesterol mg/dL 79.6  --    Hdl/Cholesterol Ratio % 28.6  --    Non-HDL Cholesterol mg/dL 105  --    Total Cholesterol/HDL Ratio  3.5  --              ASSESSMENT:  Problem List Items Addressed This Visit     None      Visit Diagnoses     Bronchitis with tracheitis    -  Primary    Relevant Medications    cefUROXime (CEFTIN) 500 MG tablet          PLAN:   Orders Placed This Encounter    cefUROXime (CEFTIN) 500 MG tablet     No orders of the defined types were placed in this encounter.      Follow-up with new PCP in 3months.   Dr. Kimberly Pérez  Internal Medicine

## 2019-12-24 ENCOUNTER — TELEPHONE (OUTPATIENT)
Dept: INTERNAL MEDICINE | Facility: CLINIC | Age: 84
End: 2019-12-24

## 2019-12-24 NOTE — TELEPHONE ENCOUNTER
----- Message from Mulu Dumont sent at 2019  6:18 AM CST -----  Contact: my chart  89 yrs, None  MRN:   [id number]  ::   3/25/1929  Lang:   English  Specialty Comments:   Length of Stay (Hrs):   None  Pt Alonso Status:   Alonso  Prim Cvg::   MEDICARE/MEDICARE PART A & B  Cvg Last Verified Date:   2019  Patient Types:   None  PCP:   Kimberly Pérez MD  Care Team:     Allergies:   Bactrim [Sulfamethoxazole-trimethoprim] (Reaction: Other (See Comments)),      Namenda [Memantine] (Reaction: Not Documented)  Patient Portal:   Active (w/ proxy users), 2019 5:23 PM  FYI  None   More Detail >>  Appointment Request  Chloe Foster  Sent: Sun 2019  5:29 PM  To: [facility]     Chloe Foster  MRN: [id number] : 3/25/1929  Pt Home: [phone]    Entered: [phone]      Message     Appointment Request From: Chloe Foster    With Provider: ALFREDO    Preferred Date Range: 2019 - 2019    Preferred Times: Any time    Reason for visit: cold/cough    Comments:  Cough/cold  Primary Coverage (Effective 3/1/1994)     Payer Plan Group Number Group Name Effective From Effective To  MEDICARE MEDICARE PART A & B   3/1/1994   Secondary Coverage (Effective 3/1/1994)     Payer Plan Group Number Group Name Effective From Effective To  MEDICARE MEDICARE PART A & B   3/1/1994   Patient Demographics     Address  [address] [address] Phone  [phone] (Home) #Preferred#  [phone] (Mobile) E-mail Address  [email or web address]  # of No Show in Current Department:0

## 2020-02-16 DIAGNOSIS — E78.00 HYPERCHOLESTEROLEMIA: ICD-10-CM

## 2020-02-17 RX ORDER — PRAVASTATIN SODIUM 40 MG/1
TABLET ORAL
Qty: 90 TABLET | Refills: 1 | OUTPATIENT
Start: 2020-02-17

## 2020-02-19 DIAGNOSIS — Z13.220 ENCOUNTER FOR LIPID SCREENING FOR CARDIOVASCULAR DISEASE: ICD-10-CM

## 2020-02-19 DIAGNOSIS — E04.2 NONTOXIC MULTINODULAR GOITER: ICD-10-CM

## 2020-02-19 DIAGNOSIS — Z13.6 ENCOUNTER FOR LIPID SCREENING FOR CARDIOVASCULAR DISEASE: ICD-10-CM

## 2020-02-19 DIAGNOSIS — E78.00 HYPERCHOLESTEROLEMIA: ICD-10-CM

## 2020-02-19 DIAGNOSIS — E89.0 POSTABLATIVE HYPOTHYROIDISM: Primary | ICD-10-CM

## 2020-02-20 ENCOUNTER — PATIENT MESSAGE (OUTPATIENT)
Dept: FAMILY MEDICINE | Facility: CLINIC | Age: 85
End: 2020-02-20

## 2020-02-20 RX ORDER — PRAVASTATIN SODIUM 40 MG/1
40 TABLET ORAL DAILY
Qty: 90 TABLET | Refills: 1 | Status: SHIPPED | OUTPATIENT
Start: 2020-02-20 | End: 2020-05-27

## 2020-03-06 ENCOUNTER — PATIENT OUTREACH (OUTPATIENT)
Dept: ADMINISTRATIVE | Facility: HOSPITAL | Age: 85
End: 2020-03-06

## 2020-03-06 ENCOUNTER — OFFICE VISIT (OUTPATIENT)
Dept: FAMILY MEDICINE | Facility: CLINIC | Age: 85
End: 2020-03-06
Payer: MEDICARE

## 2020-03-06 VITALS
SYSTOLIC BLOOD PRESSURE: 100 MMHG | BODY MASS INDEX: 27.31 KG/M2 | HEIGHT: 69 IN | TEMPERATURE: 98 F | DIASTOLIC BLOOD PRESSURE: 54 MMHG | OXYGEN SATURATION: 96 % | WEIGHT: 184.38 LBS | HEART RATE: 86 BPM

## 2020-03-06 DIAGNOSIS — E78.00 HYPERCHOLESTEROLEMIA: ICD-10-CM

## 2020-03-06 DIAGNOSIS — S91.109A OPEN WOUND OF TOE, INITIAL ENCOUNTER: ICD-10-CM

## 2020-03-06 DIAGNOSIS — R73.09 ABNORMAL GLUCOSE: Primary | ICD-10-CM

## 2020-03-06 DIAGNOSIS — E89.0 POSTABLATIVE HYPOTHYROIDISM: ICD-10-CM

## 2020-03-06 PROCEDURE — 99999 PR PBB SHADOW E&M-EST. PATIENT-LVL IV: CPT | Mod: PBBFAC,,, | Performed by: INTERNAL MEDICINE

## 2020-03-06 PROCEDURE — 99999 PR PBB SHADOW E&M-EST. PATIENT-LVL IV: ICD-10-PCS | Mod: PBBFAC,,, | Performed by: INTERNAL MEDICINE

## 2020-03-06 PROCEDURE — 87070 CULTURE OTHR SPECIMN AEROBIC: CPT

## 2020-03-06 PROCEDURE — 99214 PR OFFICE/OUTPT VISIT, EST, LEVL IV, 30-39 MIN: ICD-10-PCS | Mod: S$PBB,,, | Performed by: INTERNAL MEDICINE

## 2020-03-06 PROCEDURE — 99214 OFFICE O/P EST MOD 30 MIN: CPT | Mod: PBBFAC,PN | Performed by: INTERNAL MEDICINE

## 2020-03-06 PROCEDURE — 99214 OFFICE O/P EST MOD 30 MIN: CPT | Mod: S$PBB,,, | Performed by: INTERNAL MEDICINE

## 2020-03-06 RX ORDER — AMOXICILLIN AND CLAVULANATE POTASSIUM 875; 125 MG/1; MG/1
1 TABLET, FILM COATED ORAL 2 TIMES DAILY
Qty: 20 TABLET | Refills: 0 | Status: SHIPPED | OUTPATIENT
Start: 2020-03-06 | End: 2020-03-16

## 2020-03-06 RX ORDER — DONEPEZIL HYDROCHLORIDE 10 MG/1
5 TABLET, FILM COATED ORAL DAILY
COMMUNITY
Start: 2020-01-21 | End: 2020-05-25 | Stop reason: SDUPTHER

## 2020-03-06 RX ORDER — PRAVASTATIN SODIUM 40 MG/1
40 TABLET ORAL DAILY
Qty: 90 TABLET | Refills: 1 | Status: CANCELLED | OUTPATIENT
Start: 2020-03-06

## 2020-03-06 NOTE — PATIENT INSTRUCTIONS
We have reviewed your prescription medications.   We will add a [id number] to labs ordered for next visit.  We will send a wound culture of fluid from toe.  Take augmentin 875 twice a day x 10 days.

## 2020-03-11 ENCOUNTER — TELEPHONE (OUTPATIENT)
Dept: FAMILY MEDICINE | Facility: CLINIC | Age: 85
End: 2020-03-11

## 2020-03-11 NOTE — TELEPHONE ENCOUNTER
----- Message from Maggie Vazquez MD sent at 3/10/2020  8:43 PM CDT -----  No growth on her wound culture.

## 2020-03-11 NOTE — PROGRESS NOTES
Subjective:       Patient ID: Chloe Foster is a 89 y.o. female.    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    She is planning on seeing me to establish care for chronic conditions in a few weeks. She is here today because she is having pain in her toe. They noticed that she has a blister on the edge of her toenail. She does not have a hang nail. She has not had an injury. They noticed this blister about a week ago. Her blood pressure is low today. She is not on medication for high blood pressure. She is not feeling dizzy even though her BP is low today. Her last labs showed that she had high blood sugars. She had a normal HgbA1c in July of 2018. It was 5.6 at that time. She also has mild anemia.     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    Hypercholesterolemia 01/16/2013    Nontoxic multinodular goiter 02/18/2013    Hearing loss, sensorineural 02/24/2015    Recurrent UTI 05/05/2015    Bladder cystocele 05/05/2015    Abnormal glucose 03/06/2020     Resolved Ambulatory Problems     Diagnosis Date Noted    Onychia and paronychia of finger 07/18/2011    Muscle weakness 01/16/2013    Blister of left ankle 05/06/2013    Black eye 05/06/2013    Primary hypothyroidism 05/18/2015    Herpes zoster dermatitis 10/27/2015    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     Past Medical History:   Diagnosis Date    Anxiety     Dyslipidemia     Hyperthyroidism     Mitral valve prolapse     Shingles     Thyroid nodule 2/18/2013    Urinary tract infection           MEDICATIONS:  Current Outpatient Medications:     ANTIOX#10/OM3/DHA/EPA/LUT/ZEAX (I-CAPS ORAL), Take 2 capsules by mouth once daily., Disp: , Rfl:     calcium-vitamin D (OSCAL) 250 (625)-125 mg-unit per tablet, Take 1 tablet by mouth 2 (two) times daily., Disp: , Rfl:     donepezil (ARICEPT) 10 MG tablet, , Disp: , Rfl:     levothyroxine (SYNTHROID) 75 MCG tablet, TAKE ONE TABLET EVERY MORNING BEFORE BREAKFAST, Disp: 90 tablet, Rfl: 0    multivit-iron-FA-calcium &mins (ONE-A-DAY WOMENS FORMULA) 18 mg iron-400 mcg-500 mg Ca Tab, Take 1 tablet by mouth once daily. , Disp: , Rfl:     pravastatin (PRAVACHOL) 40 MG tablet, Take 1 tablet (40 mg total) by mouth once daily., Disp: 90 tablet, Rfl: 1    sertraline (ZOLOFT) 100 MG tablet, TAKE 1 AND 1/2 TABLETS BY MOUTH EVERY DAY, Disp: 135 tablet, Rfl: 0    amoxicillin-clavulanate 875-125mg (AUGMENTIN) 875-125 mg per tablet, Take 1 tablet by mouth 2 (two) times daily. for 10 days, Disp: 20 tablet, Rfl: 0    FLUZONE HIGH-DOSE 2019-20, PF, 180 mcg/0.5 mL Syrg, , Disp: , Rfl:       HEALTH MAINTENANCE:   Health Maintenance   Topic Date Due    Lipid Panel  11/19/2020    TETANUS VACCINE  10/24/2029    Pneumococcal Vaccine (65+ Low/Medium Risk)  Completed       Review of Systems   Constitutional: Negative for chills, fatigue and fever.   HENT: Negative for congestion, ear discharge, ear pain, rhinorrhea and sore throat.    Eyes: Negative for discharge, redness and itching.   Respiratory: Negative for cough, chest tightness, shortness of breath and wheezing.    Cardiovascular: Negative for chest pain, palpitations and leg swelling.   Gastrointestinal: Negative for abdominal pain, constipation, diarrhea, nausea and vomiting.   Endocrine: Negative for cold intolerance and heat intolerance.   Genitourinary: Negative for dysuria, flank pain, frequency and hematuria.   Musculoskeletal: Negative for arthralgias, back pain, joint swelling and myalgias.   Skin: Negative  for color change and rash.   Neurological: Negative for dizziness, tremors, numbness and headaches.   Psychiatric/Behavioral: Negative for dysphoric mood and sleep disturbance. The patient is not nervous/anxious.        Objective:          A1C:  Recent Labs   Lab 07/17/18  1122   Hemoglobin A1C 5.6     CBC:  Recent Labs   Lab 01/17/18 1921 12/24/18  1854 06/13/19  1004 11/19/19  0930 12/05/19 1940   WBC 13.09 H 11.28 7.46 6.58 9.92   RBC 4.38 4.27 4.23 4.14 3.83 L   Hemoglobin 13.7 13.0 13.0 12.4 11.9 L   Hematocrit 40.3 39.5 39.0 38.8 35.5 L   Platelets 297 188 216 202 210   Mean Corpuscular Volume 92 93 92 94 93   Mean Corpuscular Hemoglobin 31.3 H 30.4 30.7 30.0 31.1 H   Mean Corpuscular Hemoglobin Conc 34.0 32.9 33.3 32.0 33.5     CMP:  Recent Labs   Lab 01/17/18 1921 12/24/18  1854 06/13/19  1004 11/19/19  0930 12/05/19 1940   Glucose 104 140 H 101 104 170 H   Calcium 9.3 8.9 9.9 9.4 9.0   Albumin 4.2 4.1 4.5 4.0 4.3   Total Protein 7.3 7.2 7.7 6.9 7.4   Sodium 136 137 140 141 141   Potassium 4.4 4.3 4.8 4.3 4.0   CO2 26 25 24 25 26   Chloride 103 105 105 106 105   BUN, Bld 19 H 26 H 31 H 25 H 33 H   Creatinine 0.83 0.80 0.91 0.81 0.96   Alkaline Phosphatase 77 100 83 84 98   ALT 37 73 H 17 16 18   AST 26 193 H 28 28 27   Total Bilirubin 0.4 0.9 0.6 0.5 0.2     LIPIDS:  Recent Labs   Lab 07/17/17  0917 09/13/17  1347 01/27/18  0912 07/10/18  1000 12/24/18  1854 06/13/19  1004 11/19/19  0930 12/05/19 1940   TSH <0.015 L 0.142 L 1.000 1.650 0.643 1.850 5.870 H 3.140   HDL  --   --   --   --   --  51 42  --    Cholesterol  --   --   --   --   --  171 147  --    Triglycerides  --   --   --   --   --  93 127  --    LDL Cholesterol  --   --   --   --   --  101.4 79.6  --    Hdl/Cholesterol Ratio  --   --   --   --   --  29.8 28.6  --    Non-HDL Cholesterol  --   --   --   --   --  120 105  --    Total Cholesterol/HDL Ratio  --   --   --   --   --  3.4 3.5  --      TSH:  Recent Labs   Lab 07/17/17  0945  09/13/17  1347 01/27/18  0912 07/10/18  1000 12/24/18  1854 06/13/19  1004 11/19/19  0930 12/05/19  1940   TSH <0.015 L 0.142 L 1.000 1.650 0.643 1.850 5.870 H 3.140           Physical Exam   Constitutional: She appears well-developed and well-nourished. She does not have a sickly appearance.   HENT:   Head: Normocephalic and atraumatic.   Right Ear: External ear normal. Tympanic membrane is not erythematous. No middle ear effusion.   Left Ear: External ear normal. Tympanic membrane is not erythematous.  No middle ear effusion.   Nose: No rhinorrhea. Right sinus exhibits no maxillary sinus tenderness and no frontal sinus tenderness. Left sinus exhibits no maxillary sinus tenderness and no frontal sinus tenderness.   Mouth/Throat: No posterior oropharyngeal edema or posterior oropharyngeal erythema. No tonsillar exudate.   Eyes: Pupils are equal, round, and reactive to light. Conjunctivae and EOM are normal. Right eye exhibits no discharge. Left eye exhibits no discharge. Right conjunctiva is not injected. Left conjunctiva is not injected.   Neck: No thyromegaly present.   Cardiovascular: Normal rate, regular rhythm, normal heart sounds and intact distal pulses.   No murmur heard.  Pulmonary/Chest: Effort normal and breath sounds normal. She has no wheezes. She has no rhonchi. She has no rales.   Abdominal: Bowel sounds are normal. She exhibits no distension. There is no tenderness.   Musculoskeletal: She exhibits no edema or tenderness.   Lymphadenopathy:     She has no cervical adenopathy.   Neurological: She displays normal reflexes. No cranial nerve deficit.   Skin: Skin is warm and dry. No lesion and no rash noted.   Psychiatric: She has a normal mood and affect. Her behavior is normal. Her mood appears not anxious. Her speech is not rapid and/or pressured. She is not agitated and not aggressive. She does not exhibit a depressed mood.             Assessment and Plan:     Problem List Items Addressed This Visit         Cardiac/Vascular    Hypercholesterolemia - she is on pravachol. Lipids have not been checked in a while.        Endocrine    Postablative hypothyroidism - she is on synthroid. We will check labs.       Abnormal glucose - Primary - she has abnormal glucose. We will check HgbA1c.     Relevant Orders    Hemoglobin A1c    POCT Glucose, Hand-Held Device      Other Visit Diagnoses     Open wound of toe, initial encounter    - We drained the wound and we will send a wound culture. We will treat with augmentin. Recheck in 2 weeks.     Relevant Orders    CULTURE, AEROBIC  (SPECIFY SOURCE)          Orders Placed This Encounter    CULTURE, AEROBIC  (SPECIFY SOURCE)    Aerobic culture    Hemoglobin A1c    POCT Glucose, Hand-Held Device    amoxicillin-clavulanate 875-125mg (AUGMENTIN) 875-125 mg per tablet         Follow-up with me in 2 weeks.       Edy Vazquez MD,  FACP  Internal Medicine

## 2020-03-11 NOTE — TELEPHONE ENCOUNTER
----- Message from Lauren Bolden sent at 3/11/2020  8:50 AM CDT -----  Contact: [phone]/self  Type:  Patient Returning Call    Who Called: Patient  Who Left Message for Patient: Judith  Does the patient know what this is regarding?: results  Would the patient rather a call back or a response via [employer]? Call back  Best Call Back Number: [phone]  Additional Information: n/a

## 2020-03-12 ENCOUNTER — PATIENT MESSAGE (OUTPATIENT)
Dept: FAMILY MEDICINE | Facility: CLINIC | Age: 85
End: 2020-03-12

## 2020-03-16 ENCOUNTER — DOCUMENTATION ONLY (OUTPATIENT)
Dept: AUDIOLOGY | Facility: CLINIC | Age: 85
End: 2020-03-16

## 2020-03-20 LAB — BACTERIA SPEC AEROBE CULT: ABNORMAL

## 2020-03-23 ENCOUNTER — TELEPHONE (OUTPATIENT)
Dept: FAMILY MEDICINE | Facility: CLINIC | Age: 85
End: 2020-03-23

## 2020-03-23 NOTE — TELEPHONE ENCOUNTER
----- Message from Maggie Vazquez MD sent at 3/22/2020  3:29 PM CDT -----  Her wound culture has finally been identified as growing aspergillus. If she is still having symptoms, we can send her in some itraconazole.Please thierno to see how her toe is doing.

## 2020-03-23 NOTE — TELEPHONE ENCOUNTER
Spoke with patient daughter, Her mother toe is great. NO problem at all. She don,t think her mother need any medication unless you think she should be on any medication at this time. Other then that her mother is doing great. Vf/ma

## 2020-03-27 ENCOUNTER — TELEPHONE (OUTPATIENT)
Dept: CARDIOLOGY | Facility: CLINIC | Age: 85
End: 2020-03-27

## 2020-03-27 RX ORDER — METRONIDAZOLE 500 MG/1
500 TABLET ORAL EVERY 12 HOURS
Qty: 14 TABLET | Refills: 0 | Status: SHIPPED | OUTPATIENT
Start: 2020-03-27 | End: 2020-03-30 | Stop reason: SDUPTHER

## 2020-03-27 NOTE — TELEPHONE ENCOUNTER
----- Message from Cornelio Gipson sent at 3/27/2020  4:38 PM CDT -----  Contact: Chloe , daughter / [phone]   Chloe would like to speak with your office regarding the patient having fever and diarrhea. Please Advise.

## 2020-03-27 NOTE — TELEPHONE ENCOUNTER
Spoke to pt daughter on phone. Daughter states pt was started on Namenda 4 days ago and the diarrhea started shortly after taking it. Pt has stopped taking namenda and has tried OTC meds to stop diarrhea but has had no results. Pt daughter requesting RX for Lomotil. Pt was also scheduled for virtual visit on Monday afternoon. Please advise.

## 2020-03-27 NOTE — TELEPHONE ENCOUNTER
I am concerned that the diarrhea may be from taking the augmentin. I have sent a different antibiotic for her to take. I do not recommend lomotil. Try peptobismol instead. It is available in a caplet form. It will make stool dark.

## 2020-03-30 ENCOUNTER — PATIENT MESSAGE (OUTPATIENT)
Dept: FAMILY MEDICINE | Facility: CLINIC | Age: 85
End: 2020-03-30

## 2020-03-30 ENCOUNTER — OFFICE VISIT (OUTPATIENT)
Dept: FAMILY MEDICINE | Facility: CLINIC | Age: 85
End: 2020-03-30
Payer: MEDICARE

## 2020-03-30 ENCOUNTER — TELEPHONE (OUTPATIENT)
Dept: CARDIOLOGY | Facility: CLINIC | Age: 85
End: 2020-03-30

## 2020-03-30 DIAGNOSIS — R53.83 LETHARGY: Primary | ICD-10-CM

## 2020-03-30 DIAGNOSIS — R19.7 DIARRHEA, UNSPECIFIED TYPE: ICD-10-CM

## 2020-03-30 DIAGNOSIS — B35.1 ONYCHOMYCOSIS: ICD-10-CM

## 2020-03-30 DIAGNOSIS — F03.90 DEMENTIA WITHOUT BEHAVIORAL DISTURBANCE, UNSPECIFIED DEMENTIA TYPE: ICD-10-CM

## 2020-03-30 DIAGNOSIS — L03.031 PARONYCHIA OF GREAT TOE OF RIGHT FOOT: ICD-10-CM

## 2020-03-30 PROCEDURE — 99214 OFFICE O/P EST MOD 30 MIN: CPT | Mod: 95,,, | Performed by: INTERNAL MEDICINE

## 2020-03-30 PROCEDURE — 99214 PR OFFICE/OUTPT VISIT, EST, LEVL IV, 30-39 MIN: ICD-10-PCS | Mod: 95,,, | Performed by: INTERNAL MEDICINE

## 2020-03-30 RX ORDER — METRONIDAZOLE 500 MG/1
500 TABLET ORAL EVERY 12 HOURS
Qty: 20 TABLET | Refills: 0 | Status: SHIPPED | OUTPATIENT
Start: 2020-03-30 | End: 2020-04-06

## 2020-03-30 RX ORDER — CLOTRIMAZOLE 1 %
CREAM (GRAM) TOPICAL 2 TIMES DAILY
Qty: 45 G | Refills: 1 | Status: SHIPPED | OUTPATIENT
Start: 2020-03-30 | End: 2020-03-30 | Stop reason: SDUPTHER

## 2020-03-30 RX ORDER — CLOTRIMAZOLE 1 %
CREAM (GRAM) TOPICAL 2 TIMES DAILY
Qty: 45 G | Refills: 1 | Status: SHIPPED | OUTPATIENT
Start: 2020-03-30 | End: 2021-02-26

## 2020-03-30 NOTE — PATIENT INSTRUCTIONS
We have reviewed your prescription medications.   We will treat diarrhea with Flagyl in case of C difficile infection.  Try to avoid Lomotil and use Pepto-Bismol instead.  Do not force her to eat but try to keep her hydrated.    Stay off Namenda for now.  Let us know if anything gets worse.

## 2020-03-30 NOTE — TELEPHONE ENCOUNTER
Spoke to pt daughter on phone. Pt daughter states she was going to cancel appointment but does not need to any more.

## 2020-03-30 NOTE — TELEPHONE ENCOUNTER
----- Message from Cornelio Gipson sent at 3/27/2020  5:06 PM CDT -----  Contact: [phone] / loyda , daughter   Patient's daughter would like to speak with your office again regarding previous phone call. Please Advise.

## 2020-03-30 NOTE — PROGRESS NOTES
Subjective:       Patient ID: Chloe Foster is a 89 y.o. female.    Chief Complaint: No chief complaint on file.     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    The patient location is: [place], [place]  The chief complaint leading to consultation is: diarrhea  Visit type: Virtual visit with synchronous audio and video  Total time spent with patient: 15 minutes  Each patient to whom he or she provides medical services by telemedicine is:  (1) informed of the relationship between the physician and patient and the respective role of any other health care provider with respect to management of the patient; and (2) notified that he or she may decline to receive medical services by telemedicine and may withdraw from such care at any time.    The patient is being seen today for follow-up of a neck a mycosis on her toenail and for the new development of diarrhea and nausea.  Her son and daughter-in-law helped her set up the virtual visit and participate in the visit.  They report that she had seen her neurologist recently and was put on Namenda.  Since then, she was somewhat lethargic and was having some stomach issues.  They state that she was sleeping 18-19 hours a day.  They called the neurologist who recommended that she stop taking Namenda.  They had some Lomotil at home which they decided to give her for the diarrhea.  They stated that she started doing better on Saturday and even ate something.  She was without diarrhea for about 10 or 12 hr, took a bath, and got up out of bed for little while.  That night, her diarrhea resumed.  She had a temperature as high as 100.1°.  She has not been coughing.  They  sent me a picture of her toe where she had the paronychia.  I had prescribed Augmentin for 10 days to treat the paronychia and we sent a culture of the area.  The wound culture grew back aspirin [place] fungus.  No bacteria grew out.  The skin surrounding her toenail looks much better.  The corner of the toenail is yellow and flaky.  Given that she has recently been treated with Augmentin and has developed diarrhea with a fever I am recommending that she not take Lomotil.  I suspect that she may have Clostridium difficile diarrhea.  I have sent her prescription for Flagyl to take twice a day for 10 days.  I have advised them of the potential side effect of med a metallic taste in the mouth.  I have recommended that she use Pepto-Bismol rather than Lomotil because stopping the diarrhea can make the infection worse.  I have warned them of the tendency of Pepto-Bismol to turn mucous membranes and stool dark.  They will watch her closely and let us know if she gets worse.  I recommended that they try to keep her hydrated and not force her to eat if she isn't hungry.    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    Hypercholesterolemia 01/16/2013    Nontoxic multinodular goiter 02/18/2013    Hearing loss, sensorineural 02/24/2015    Recurrent UTI 05/05/2015    Bladder cystocele 05/05/2015    Abnormal glucose 03/06/2020     Resolved Ambulatory Problems     Diagnosis Date Noted    Onychia and paronychia of finger 07/18/2011    Muscle weakness 01/16/2013    Blister of left ankle 05/06/2013    Black eye 05/06/2013    Primary hypothyroidism 05/18/2015    Herpes zoster dermatitis 10/27/2015    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     Past Medical History:   Diagnosis Date    Anxiety     Dyslipidemia     Hyperthyroidism     Mitral valve prolapse     Shingles     Thyroid nodule 2/18/2013    Urinary tract infection           MEDICATIONS:  Current Outpatient Medications:     ANTIOX#10/OM3/DHA/EPA/LUT/ZEAX (I-CAPS ORAL), Take 2 capsules by mouth once daily., Disp: , Rfl:     calcium-vitamin D (OSCAL) 250 (625)-125 mg-unit per tablet, Take 1 tablet by mouth 2 (two) times daily., Disp: , Rfl:     clotrimazole (LOTRIMIN) 1 % cream, Apply topically 2 (two) times daily., Disp: 45 g, Rfl: 1    donepezil (ARICEPT) 10 MG tablet, , Disp: , Rfl:     FLUZONE HIGH-DOSE 2019-20, PF, 180 mcg/0.5 mL Syrg, , Disp: , Rfl:     levothyroxine (SYNTHROID) 75 MCG tablet, TAKE ONE TABLET EVERY MORNING BEFORE BREAKFAST, Disp: 90 tablet, Rfl: 0    metroNIDAZOLE (FLAGYL) 500 MG tablet, Take 1 tablet (500 mg total) by mouth every 12 (twelve) hours., Disp: 20 tablet, Rfl: 0    multivit-iron-FA-calcium &mins (ONE-A-DAY WOMENS FORMULA) 18 mg iron-400 mcg-500 mg Ca Tab, Take 1 tablet by mouth once daily. , Disp: , Rfl:     pravastatin (PRAVACHOL) 40 MG tablet, Take 1 tablet (40 mg total) by mouth once daily., Disp: 90 tablet, Rfl: 1    sertraline (ZOLOFT) 100 MG tablet, TAKE 1 AND 1/2 TABLETS BY MOUTH EVERY DAY, Disp: 135 tablet, Rfl: 0      HEALTH MAINTENANCE:   Health Maintenance   Topic Date Due    Lipid Panel  03/13/2021    TETANUS VACCINE  10/24/2029    Pneumococcal Vaccine (65+ Low/Medium Risk)  Completed       Review of Systems   Constitutional: Positive for fatigue and fever. Negative for chills.   HENT: Negative for congestion, ear discharge, ear pain, rhinorrhea and sore throat.    Eyes: Negative for discharge, redness and itching.   Respiratory: Negative for cough, chest tightness, shortness of breath and wheezing.    Cardiovascular: Negative for chest pain, palpitations and leg swelling.   Gastrointestinal: Positive for diarrhea and nausea. Negative for abdominal pain, constipation and vomiting.   Endocrine: Negative for cold intolerance and heat intolerance.   Genitourinary: Negative for dysuria, flank pain, frequency and hematuria.    Musculoskeletal: Negative for arthralgias, back pain, joint swelling and myalgias.   Skin: Negative for color change and rash.   Neurological: Negative for dizziness, tremors, numbness and headaches.   Psychiatric/Behavioral: Negative for dysphoric mood and sleep disturbance. The patient is not nervous/anxious.        Objective:          A1C:  Recent Labs   Lab 07/17/18  1122 03/13/20  0957   Hemoglobin A1C 5.6 5.6     CBC:  Recent Labs   Lab 01/17/18 1921 12/24/18 1854 06/13/19  1004 11/19/19  0930 12/05/19 1940 03/13/20  0957   WBC 13.09 H 11.28 7.46 6.58 9.92 7.51   RBC 4.38 4.27 4.23 4.14 3.83 L 4.27   Hemoglobin 13.7 13.0 13.0 12.4 11.9 L 13.0   Hematocrit 40.3 39.5 39.0 38.8 35.5 L 40.7   Platelets 297 188 216 202 210 176   Mean Corpuscular Volume 92 93 92 94 93 95   Mean Corpuscular Hemoglobin 31.3 H 30.4 30.7 30.0 31.1 H 30.4   Mean Corpuscular Hemoglobin Conc 34.0 32.9 33.3 32.0 33.5 31.9 L     CMP:  Recent Labs   Lab 01/17/18 1921 12/24/18 1854 06/13/19  1004 11/19/19  0930 12/05/19 1940 03/13/20  0957   Glucose 104 140 H 101 104 170 H 107   Calcium 9.3 8.9 9.9 9.4 9.0 9.0   Albumin 4.2 4.1 4.5 4.0 4.3 4.3   Total Protein 7.3 7.2 7.7 6.9 7.4 7.4   Sodium 136 137 140 141 141 138   Potassium 4.4 4.3 4.8 4.3 4.0 4.4   CO2 26 25 24 25 26 27   Chloride 103 105 105 106 105 103   BUN, Bld 19 H 26 H 31 H 25 H 33 H 27 H   Creatinine 0.83 0.80 0.91 0.81 0.96 0.78   Alkaline Phosphatase 77 100 83 84 98 70   ALT 37 73 H 17 16 18 20   AST 26 193 H 28 28 27 37   Total Bilirubin 0.4 0.9 0.6 0.5 0.2 0.5     LIPIDS:  Recent Labs   Lab 07/17/17  0917 09/13/17  1347 01/27/18  0912 07/10/18  1000 12/24/18  1854 06/13/19  1004 11/19/19  0930 12/05/19  1940 03/13/20  0957   TSH <0.015 L 0.142 L 1.000 1.650 0.643 1.850 5.870 H 3.140 1.370   HDL  --   --   --   --   --  51 42  --  46   Cholesterol  --   --   --   --   --  171 147  --  148   Triglycerides  --   --   --   --   --  93 127  --  91   LDL Cholesterol  --   --    --   --   --  101.4 79.6  --  83.8   Hdl/Cholesterol Ratio  --   --   --   --   --  29.8 28.6  --  31.1   Non-HDL Cholesterol  --   --   --   --   --  120 105  --  102   Total Cholesterol/HDL Ratio  --   --   --   --   --  3.4 3.5  --  3.2     TSH:  Recent Labs   Lab 07/17/17  0917 09/13/17  1347 01/27/18  0912 07/10/18  1000 12/24/18  1854 06/13/19  1004 11/19/19  0930 12/05/19  1940 03/13/20  0957   TSH <0.015 L 0.142 L 1.000 1.650 0.643 1.850 5.870 H 3.140 1.370           Physical Exam   Constitutional: She appears well-developed and well-nourished.   Slightly lethargic   Eyes: Pupils are equal, round, and reactive to light. Conjunctivae and EOM are normal.   Pulmonary/Chest: Effort normal and breath sounds normal. No respiratory distress.   Musculoskeletal: Normal range of motion.   Psychiatric: She has a normal mood and affect. Her behavior is normal.             Assessment and Plan:     Problem List Items Addressed This Visit     None      Visit Diagnoses     Lethargy    -  Primary- lethargy may be due to dehydration or to infection.  I do not think it is from the Namenda as she has been off Namenda since Wednesday.      Diarrhea, unspecified type    - initially they thought the diarrhea was due to to Namenda, but I suspect that the diarrhea is secondary to taking Augmentin.  I prescribed metronidazole twice a day for 10 days.  I do not recommend using Lomotil.  Try to use Pepto-Bismol instead.  Do not force her to eat if she is not hungry but do try to keep her hydrated.      Paronychia of great toe of right foot    - the paronychia was treated with Augmentin and the skin around the toe looks much better.      Onychomycosis    - the culture grew out Aspergillus.  We will treat with clotrimazole cream.  I do not want to give her anything else by mouth at this time.      Dementia without behavioral disturbance, unspecified dementia type    - stay off Namenda for now.  Continue Aricept.  Follow-up with neurology  when she is feeling better.          Orders Placed This Encounter    metroNIDAZOLE (FLAGYL) 500 MG tablet    clotrimazole (LOTRIMIN) 1 % cream         Follow-up with me in 3 months.  Let us know if anything gets worse.       Edy Vazquez MD,  FACP  Internal Medicine

## 2020-03-31 ENCOUNTER — PATIENT MESSAGE (OUTPATIENT)
Dept: FAMILY MEDICINE | Facility: CLINIC | Age: 85
End: 2020-03-31

## 2020-04-01 ENCOUNTER — TELEPHONE (OUTPATIENT)
Dept: FAMILY MEDICINE | Facility: CLINIC | Age: 85
End: 2020-04-01

## 2020-04-01 ENCOUNTER — PATIENT MESSAGE (OUTPATIENT)
Dept: FAMILY MEDICINE | Facility: CLINIC | Age: 85
End: 2020-04-01

## 2020-04-01 NOTE — TELEPHONE ENCOUNTER
----- Message from Kayli Cline sent at 4/1/2020 12:39 PM CDT -----  Contact: Celestino ( Son)-[phone]  Celestino ( Son)-[phone] is requesting a call back regarding his Mother, the pt son would like to talk to the Dr about his mother going to the ER first before he brings her. Please call

## 2020-04-01 NOTE — TELEPHONE ENCOUNTER
2-3 loose stools but not as watery, no visible blood. She is drinking a lot of fluids. They are still hesitant to take her to ER. Will contact Dr. Vazquez for further recommendation. Per Dr. Vazquez her main concern is if she has fever and what is her mental status?  Spoke to patient's son, temp yesterday was 99.5, today temp is 98.3, patient is more alert. Dr. Vazquez says they can still continue to monitor at home, call with update Friday.

## 2020-04-02 ENCOUNTER — TELEPHONE (OUTPATIENT)
Dept: FAMILY MEDICINE | Facility: CLINIC | Age: 85
End: 2020-04-02

## 2020-04-02 NOTE — TELEPHONE ENCOUNTER
----- Message from Maggie Vazquez MD sent at 4/2/2020 11:07 AM CDT -----  Have we heard from them today?   Please call to check on her if we have not.

## 2020-04-03 ENCOUNTER — PATIENT MESSAGE (OUTPATIENT)
Dept: FAMILY MEDICINE | Facility: CLINIC | Age: 85
End: 2020-04-03

## 2020-04-05 ENCOUNTER — PATIENT MESSAGE (OUTPATIENT)
Dept: FAMILY MEDICINE | Facility: CLINIC | Age: 85
End: 2020-04-05

## 2020-04-06 ENCOUNTER — PATIENT MESSAGE (OUTPATIENT)
Dept: FAMILY MEDICINE | Facility: CLINIC | Age: 85
End: 2020-04-06

## 2020-04-06 RX ORDER — METRONIDAZOLE 500 MG/1
500 TABLET ORAL 3 TIMES DAILY
Qty: 15 TABLET | Refills: 0 | Status: SHIPPED | OUTPATIENT
Start: 2020-04-06 | End: 2020-05-25

## 2020-04-10 ENCOUNTER — PATIENT MESSAGE (OUTPATIENT)
Dept: FAMILY MEDICINE | Facility: CLINIC | Age: 85
End: 2020-04-10

## 2020-04-15 DIAGNOSIS — R53.83 FATIGUE, UNSPECIFIED TYPE: ICD-10-CM

## 2020-04-15 RX ORDER — SERTRALINE HYDROCHLORIDE 100 MG/1
TABLET, FILM COATED ORAL
Qty: 135 TABLET | Refills: 1 | Status: SHIPPED | OUTPATIENT
Start: 2020-04-15 | End: 2021-04-15

## 2020-05-04 ENCOUNTER — PATIENT MESSAGE (OUTPATIENT)
Dept: FAMILY MEDICINE | Facility: CLINIC | Age: 85
End: 2020-05-04

## 2020-05-25 ENCOUNTER — PATIENT MESSAGE (OUTPATIENT)
Dept: FAMILY MEDICINE | Facility: CLINIC | Age: 85
End: 2020-05-25

## 2020-05-25 ENCOUNTER — OFFICE VISIT (OUTPATIENT)
Dept: FAMILY MEDICINE | Facility: CLINIC | Age: 85
End: 2020-05-25
Payer: MEDICARE

## 2020-05-25 VITALS — SYSTOLIC BLOOD PRESSURE: 123 MMHG | HEART RATE: 89 BPM | DIASTOLIC BLOOD PRESSURE: 63 MMHG

## 2020-05-25 DIAGNOSIS — Z91.81 HISTORY OF RECENT FALL: ICD-10-CM

## 2020-05-25 DIAGNOSIS — E89.0 POSTABLATIVE HYPOTHYROIDISM: ICD-10-CM

## 2020-05-25 DIAGNOSIS — E78.00 HYPERCHOLESTEROLEMIA: Primary | ICD-10-CM

## 2020-05-25 DIAGNOSIS — F03.90 DEMENTIA WITHOUT BEHAVIORAL DISTURBANCE, UNSPECIFIED DEMENTIA TYPE: ICD-10-CM

## 2020-05-25 DIAGNOSIS — R73.09 ABNORMAL GLUCOSE: ICD-10-CM

## 2020-05-25 PROCEDURE — 99214 PR OFFICE/OUTPT VISIT, EST, LEVL IV, 30-39 MIN: ICD-10-PCS | Mod: 95,,, | Performed by: INTERNAL MEDICINE

## 2020-05-25 PROCEDURE — 99214 OFFICE O/P EST MOD 30 MIN: CPT | Mod: 95,,, | Performed by: INTERNAL MEDICINE

## 2020-05-25 RX ORDER — DONEPEZIL HYDROCHLORIDE 5 MG/1
5 TABLET, FILM COATED ORAL DAILY
Qty: 30 TABLET | Refills: 5 | Status: SHIPPED | OUTPATIENT
Start: 2020-05-25 | End: 2020-12-28 | Stop reason: SDUPTHER

## 2020-05-25 RX ORDER — LEVOTHYROXINE SODIUM 75 UG/1
75 TABLET ORAL
Qty: 90 TABLET | Refills: 1 | Status: SHIPPED | OUTPATIENT
Start: 2020-05-25 | End: 2020-09-02 | Stop reason: SDUPTHER

## 2020-05-25 NOTE — PATIENT INSTRUCTIONS
We have reviewed your prescription medications.   I REFILLED THYROID MEDICINE AND DONEPEZIL 5MG A DAY.  tAKE TYLENOL FOR BACK PAIN.  I AM GLAD DIARRHEA HAS RESOLVED.  wE WILL GET LABS NEXT TIME.

## 2020-05-26 ENCOUNTER — TELEPHONE (OUTPATIENT)
Dept: FAMILY MEDICINE | Facility: CLINIC | Age: 85
End: 2020-05-26

## 2020-05-26 NOTE — TELEPHONE ENCOUNTER
----- Message from Maggie Vazquez MD sent at 5/25/2020  1:39 PM CDT -----  Regarding: F/UP  PLEASE SCHEDULE FOLLOW-UP IN 3 MONTHS.

## 2020-05-27 DIAGNOSIS — E78.00 HYPERCHOLESTEROLEMIA: ICD-10-CM

## 2020-05-27 RX ORDER — PRAVASTATIN SODIUM 40 MG/1
TABLET ORAL
Qty: 90 TABLET | Refills: 1 | Status: SHIPPED | OUTPATIENT
Start: 2020-05-27 | End: 2021-02-02

## 2020-05-28 ENCOUNTER — PATIENT MESSAGE (OUTPATIENT)
Dept: ENDOCRINOLOGY | Facility: CLINIC | Age: 85
End: 2020-05-28

## 2020-05-28 DIAGNOSIS — E89.0 POSTABLATIVE HYPOTHYROIDISM: ICD-10-CM

## 2020-05-28 DIAGNOSIS — E04.2 NONTOXIC MULTINODULAR GOITER: Primary | ICD-10-CM

## 2020-05-31 NOTE — PROGRESS NOTES
Subjective:       Patient ID: Chloe Foster is a 89 y.o. female.    Chief Complaint: No chief complaint on file.     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     The patient presents today for follow-up of chronic conditions.     The patient location is: [place]  The chief complaint leading to consultation is: chronic conditions    Visit type: audiovisual    Face to Face time with patient: 20 minutes  30  minutes of total time spent on the encounter, which includes face to face time and non-face to face time preparing to see the patient (eg, review of tests), Obtaining and/or reviewing separately obtained history, Documenting clinical information in the electronic or other health record, Independently interpreting results (not separately reported) and communicating results to the patient/family/caregiver, or Care coordination (not separately reported).         Each patient to whom he or she provides medical services by telemedicine is:  (1) informed of the relationship between the physician and patient and the respective role of any other health care provider with respect to management of the patient; and (2) notified that he or she may decline to receive medical services by telemedicine and may withdraw from such care at any time.    Notes:  The patient's son is assisting with the visit.  They report that she has been doing much better.  When I saw her last she had a colitis which was slow to resolve.  They were afraid to take her to the emergency room because of the COVID outbreak.  She even had blood in her stool at 1 point.  We treated her with Flagyl and she eventually responded.  They reported  that she got a lot better after taking the Flagyl but then she became lethargic and they were concerned that the medication was affecting her.  They stopped the Flagyl at that point.  She had blood work done in March which looked good.  Her blood counts and blood chemistries were normal.  Her liver tests were also normal.  Her cholesterol was good with an LDL of 83.  Her thyroid was normal.  They report that she fell the other day.  She injured her mid back.  She is not having any trouble walking and she has no numbness at this time.  She does have a history of mild back problems.  She has also been feeling tired lately.  She has only been taking 1/2 of the donepezil pill because of concerns of possible side effects.  Since her bowel movements have been normal lately we are going to try to increase the donepezil again.    The patient denies chest pain, shortness of breath, nausea, or dizziness.     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    Hypercholesterolemia 01/16/2013    Nontoxic multinodular goiter 02/18/2013    Hearing loss, sensorineural 02/24/2015    Recurrent UTI 05/05/2015    Bladder cystocele 05/05/2015    Abnormal glucose 03/06/2020     Resolved Ambulatory Problems     Diagnosis Date Noted    Onychia and paronychia of finger 07/18/2011    Muscle weakness 01/16/2013    Blister of left ankle 05/06/2013    Black eye 05/06/2013    Primary hypothyroidism 05/18/2015    Herpes zoster dermatitis 10/27/2015    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     Past Medical History:   Diagnosis Date    Anxiety     Dyslipidemia     Hyperthyroidism     Mitral valve prolapse     Shingles     Thyroid nodule 2/18/2013    Urinary tract infection          MEDICATIONS:  Current Outpatient Medications:     ANTIOX#10/OM3/DHA/EPA/LUT/ZEAX (I-CAPS ORAL), Take 2 capsules by mouth once daily., Disp: , Rfl:      calcium-vitamin D (OSCAL) 250 (625)-125 mg-unit per tablet, Take 1 tablet by mouth 2 (two) times daily., Disp: , Rfl:     clotrimazole (LOTRIMIN) 1 % cream, Apply topically 2 (two) times daily., Disp: 45 g, Rfl: 1    donepeziL (ARICEPT) 5 MG tablet, Take 1 tablet (5 mg total) by mouth once daily., Disp: 30 tablet, Rfl: 5    levothyroxine (SYNTHROID) 75 MCG tablet, Take 1 tablet (75 mcg total) by mouth before breakfast., Disp: 90 tablet, Rfl: 1    multivit-iron-FA-calcium &mins (ONE-A-DAY WOMENS FORMULA) 18 mg iron-400 mcg-500 mg Ca Tab, Take 1 tablet by mouth once daily. , Disp: , Rfl:     pravastatin (PRAVACHOL) 40 MG tablet, TAKE 1 TABLET BY MOUTH EVERY DAY, Disp: 90 tablet, Rfl: 1    sertraline (ZOLOFT) 100 MG tablet, TAKE 1.5 TABLETS BY MOUTH EVERY DAY, Disp: 135 tablet, Rfl: 1      HEALTH MAINTENANCE:   Health Maintenance   Topic Date Due    Lipid Panel  03/13/2021    TETANUS VACCINE  10/24/2029    Pneumococcal Vaccine (65+ Low/Medium Risk)  Completed       Review of Systems   Constitutional: Negative for activity change, chills, fatigue, fever and unexpected weight change.   HENT: Negative for congestion, ear discharge, ear pain, hearing loss, rhinorrhea, sore throat and trouble swallowing.    Eyes: Negative for discharge, redness, itching and visual disturbance.   Respiratory: Negative for cough, chest tightness, shortness of breath and wheezing.    Cardiovascular: Negative for chest pain, palpitations and leg swelling.   Gastrointestinal: Negative for abdominal pain, blood in stool, constipation, diarrhea, nausea and vomiting.   Endocrine: Negative for cold intolerance, heat intolerance, polydipsia and polyuria.   Genitourinary: Negative for difficulty urinating, dysuria, flank pain, frequency, hematuria and menstrual problem.   Musculoskeletal: Negative for arthralgias, back pain, joint swelling, myalgias and neck pain.   Skin: Negative for color change and rash.   Neurological: Positive for  weakness. Negative for dizziness, tremors, numbness and headaches.   Psychiatric/Behavioral: Positive for confusion. Negative for dysphoric mood and sleep disturbance. The patient is not nervous/anxious.        Objective:          A1C:  Recent Labs   Lab 07/17/18  1122 03/13/20  0957   Hemoglobin A1C 5.6 5.6     CBC:  Recent Labs   Lab 01/17/18 1921 12/24/18 1854 06/13/19  1004 11/19/19  0930 12/05/19 1940 03/13/20  0957   WBC 13.09 H 11.28 7.46 6.58 9.92 7.51   RBC 4.38 4.27 4.23 4.14 3.83 L 4.27   Hemoglobin 13.7 13.0 13.0 12.4 11.9 L 13.0   Hematocrit 40.3 39.5 39.0 38.8 35.5 L 40.7   Platelets 297 188 216 202 210 176   Mean Corpuscular Volume 92 93 92 94 93 95   Mean Corpuscular Hemoglobin 31.3 H 30.4 30.7 30.0 31.1 H 30.4   Mean Corpuscular Hemoglobin Conc 34.0 32.9 33.3 32.0 33.5 31.9 L     CMP:  Recent Labs   Lab 01/17/18 1921 12/24/18 1854 06/13/19  1004 11/19/19  0930 12/05/19 1940 03/13/20  0957   Glucose 104 140 H 101 104 170 H 107   Calcium 9.3 8.9 9.9 9.4 9.0 9.0   Albumin 4.2 4.1 4.5 4.0 4.3 4.3   Total Protein 7.3 7.2 7.7 6.9 7.4 7.4   Sodium 136 137 140 141 141 138   Potassium 4.4 4.3 4.8 4.3 4.0 4.4   CO2 26 25 24 25 26 27   Chloride 103 105 105 106 105 103   BUN, Bld 19 H 26 H 31 H 25 H 33 H 27 H   Creatinine 0.83 0.80 0.91 0.81 0.96 0.78   Alkaline Phosphatase 77 100 83 84 98 70   ALT 37 73 H 17 16 18 20   AST 26 193 H 28 28 27 37   Total Bilirubin 0.4 0.9 0.6 0.5 0.2 0.5     LIPIDS:  Recent Labs   Lab 07/17/17  0917 09/13/17  1347 01/27/18  0912 07/10/18  1000 12/24/18  1854 06/13/19  1004 11/19/19  0930 12/05/19  1940 03/13/20  0957   TSH <0.015 L 0.142 L 1.000 1.650 0.643 1.850 5.870 H 3.140 1.370   HDL  --   --   --   --   --  51 42  --  46   Cholesterol  --   --   --   --   --  171 147  --  148   Triglycerides  --   --   --   --   --  93 127  --  91   LDL Cholesterol  --   --   --   --   --  101.4 79.6  --  83.8   Hdl/Cholesterol Ratio  --   --   --   --   --  29.8 28.6  --  31.1    Non-HDL Cholesterol  --   --   --   --   --  120 105  --  102   Total Cholesterol/HDL Ratio  --   --   --   --   --  3.4 3.5  --  3.2     TSH:  Recent Labs   Lab 07/17/17  0917 09/13/17  1347 01/27/18  0912 07/10/18  1000 12/24/18  1854 06/13/19  1004 11/19/19  0930 12/05/19  1940 03/13/20  0957   TSH <0.015 L 0.142 L 1.000 1.650 0.643 1.850 5.870 H 3.140 1.370           Physical Exam   Constitutional: She appears well-developed and well-nourished. She does not have a sickly appearance.   HENT:   Head: Normocephalic and atraumatic.   Right Ear: Tympanic membrane is not erythematous. No middle ear effusion.   Left Ear: Tympanic membrane is not erythematous.  No middle ear effusion.   Nose: No rhinorrhea. Right sinus exhibits no maxillary sinus tenderness and no frontal sinus tenderness. Left sinus exhibits no maxillary sinus tenderness and no frontal sinus tenderness.   Mouth/Throat: No posterior oropharyngeal edema or posterior oropharyngeal erythema. No tonsillar exudate.   Eyes: Conjunctivae and EOM are normal. Right conjunctiva is not injected. Left conjunctiva is not injected.   Pulmonary/Chest: She has no rhonchi.   Skin: No lesion and no rash noted.   Psychiatric: She has a normal mood and affect. Her behavior is normal. Her mood appears not anxious. Her speech is not rapid and/or pressured. She is not agitated and not aggressive. She does not exhibit a depressed mood.             Assessment and Plan:     Problem List Items Addressed This Visit        Cardiac/Vascular    Hypercholesterolemia - Primary - on pravastatin.  Last labs were good.  Stable.       Endocrine    Postablative hypothyroidism - continue Synthroid.  Stable.    Relevant Medications    levothyroxine (SYNTHROID) 75 MCG tablet    Abnormal glucose-   Lab Results   Component Value Date    HGBA1C 5.6 03/13/2020    She is watching her sugar intake.  Stable.      Other Visit Diagnoses     Dementia without behavioral disturbance, unspecified  dementia type    - we will try increasing the donepezil to a whole pill a day.  She is currently not having any bowel issues.      History of recent fall    - she fell recently.  She is having some back pain but otherwise is okay.  Stable.          Orders Placed This Encounter    donepeziL (ARICEPT) 5 MG tablet    levothyroxine (SYNTHROID) 75 MCG tablet         Follow-up with me in 3 months.       Edy Vazquez MD,  FACP  Internal Medicine

## 2020-07-15 DIAGNOSIS — Z71.89 COMPLEX CARE COORDINATION: ICD-10-CM

## 2020-08-03 ENCOUNTER — TELEPHONE (OUTPATIENT)
Dept: OTOLARYNGOLOGY | Facility: CLINIC | Age: 85
End: 2020-08-03

## 2020-08-03 NOTE — TELEPHONE ENCOUNTER
----- Message from Makayla Austin sent at 8/3/2020 11:56 AM CDT -----  Regarding: Justin [phone]  Contact: Justin [phone]  Patient will be a new patient and is calling to schedule an Audiogram for hearing loss patient is scheduled to be seen by the doctor tomorrow. Please advice

## 2020-08-04 ENCOUNTER — OFFICE VISIT (OUTPATIENT)
Dept: OTOLARYNGOLOGY | Facility: CLINIC | Age: 85
End: 2020-08-04
Payer: MEDICARE

## 2020-08-04 VITALS
BODY MASS INDEX: 26.77 KG/M2 | SYSTOLIC BLOOD PRESSURE: 124 MMHG | HEART RATE: 105 BPM | DIASTOLIC BLOOD PRESSURE: 71 MMHG | HEIGHT: 69 IN | WEIGHT: 180.75 LBS

## 2020-08-04 DIAGNOSIS — H61.22 IMPACTED CERUMEN OF LEFT EAR: ICD-10-CM

## 2020-08-04 DIAGNOSIS — H92.11 OTORRHEA OF RIGHT EAR: ICD-10-CM

## 2020-08-04 DIAGNOSIS — S09.301A INJURY OF TYMPANIC MEMBRANE OF RIGHT EAR, INITIAL ENCOUNTER: Primary | ICD-10-CM

## 2020-08-04 PROCEDURE — 99999 PR PBB SHADOW E&M-EST. PATIENT-LVL IV: ICD-10-PCS | Mod: PBBFAC,,, | Performed by: OTOLARYNGOLOGY

## 2020-08-04 PROCEDURE — 92504 EAR MICROSCOPY EXAMINATION: CPT | Mod: PBBFAC,PN | Performed by: OTOLARYNGOLOGY

## 2020-08-04 PROCEDURE — 99999 PR PBB SHADOW E&M-EST. PATIENT-LVL IV: CPT | Mod: PBBFAC,,, | Performed by: OTOLARYNGOLOGY

## 2020-08-04 PROCEDURE — 92504 EAR MICROSCOPY EXAMINATION: CPT | Mod: S$PBB,,, | Performed by: OTOLARYNGOLOGY

## 2020-08-04 PROCEDURE — 92504 PR EAR MICROSCOPY EXAMINATION: ICD-10-PCS | Mod: S$PBB,,, | Performed by: OTOLARYNGOLOGY

## 2020-08-04 PROCEDURE — 99203 PR OFFICE/OUTPT VISIT, NEW, LEVL III, 30-44 MIN: ICD-10-PCS | Mod: 25,S$PBB,, | Performed by: OTOLARYNGOLOGY

## 2020-08-04 PROCEDURE — 99214 OFFICE O/P EST MOD 30 MIN: CPT | Mod: PBBFAC,PN | Performed by: OTOLARYNGOLOGY

## 2020-08-04 PROCEDURE — 99203 OFFICE O/P NEW LOW 30 MIN: CPT | Mod: 25,S$PBB,, | Performed by: OTOLARYNGOLOGY

## 2020-08-04 RX ORDER — OFLOXACIN 3 MG/ML
SOLUTION/ DROPS OPHTHALMIC
Qty: 5 ML | Refills: 0 | Status: SHIPPED | OUTPATIENT
Start: 2020-08-04 | End: 2020-08-19 | Stop reason: SDUPTHER

## 2020-08-04 RX ORDER — PREDNISOLONE ACETATE 10 MG/ML
SUSPENSION/ DROPS OPHTHALMIC
Qty: 5 ML | Refills: 0 | Status: SHIPPED | OUTPATIENT
Start: 2020-08-04 | End: 2020-10-01 | Stop reason: SDUPTHER

## 2020-08-04 NOTE — PROGRESS NOTES
Otolaryngology Clinic Note    Chloe Foster  Encounter Date: 8/4/2020   YOB: 1929  Referring Physician: Aaareferral Self  No address on file   PCP: Maggie Vazquez MD    Chief Complaint:   Chief Complaint   Patient presents with    Ear Drainage     some bloody drainage after leaving a qtip in right ear    Hearing Loss     bilateral       HPI: Chloe Foster is a 89 y.o. female here with hearing loss that has been present for years but also reports using a qtip  Sunday night and went too far into her ear. Son noted bloody drainage afterwards. Afraid she stuck another qtip in last night but unsure. She denies any pain. He reports her hearing has definitely seemed worse than before.    Noise exposure: no  Ear protection: not applicable  Prior ear surgeries: no  Prior trauma: no  History of ear infections: no  Hearing aids: yes      Review of Systems   Constitutional: Negative for chills, fever and weight loss.   HENT: Positive for ear discharge (bloody) and hearing loss. Negative for ear pain and tinnitus.    Eyes: Negative for blurred vision and double vision.   Respiratory: Negative for cough and shortness of breath.    Cardiovascular: Negative for chest pain and palpitations.   Gastrointestinal: Negative for nausea and vomiting.   Musculoskeletal: Negative for joint pain and neck pain.   Skin: Negative for rash.   Neurological: Negative for dizziness, focal weakness and headaches.   Psychiatric/Behavioral: Negative for depression. The patient is not nervous/anxious.         Review of patient's allergies indicates:   Allergen Reactions    Bactrim [sulfamethoxazole-trimethoprim] Other (See Comments)     She just felt bad       Past Medical History:   Diagnosis Date    Anxiety     Black eye 5/6/2013    Dyslipidemia     Hyperthyroidism     IGT (impaired glucose tolerance)     Mitral valve prolapse     Nontoxic multinodular goiter 2/18/2013    Primary hypothyroidism 5/18/2015    Shingles      left side of chest and back    Thyroid nodule 2/18/2013    Urinary tract infection        Past Surgical History:   Procedure Laterality Date    CATARACT EXTRACTION BILATERAL W/ ANTERIOR VITRECTOMY      DILATION AND CURETTAGE OF UTERUS      x2       Social History     Socioeconomic History    Marital status:      Spouse name: Not on file    Number of children: Not on file    Years of education: Not on file    Highest education level: Not on file   Occupational History    Occupation: Retired   Social Needs    Financial resource strain: Not very hard    Food insecurity     Worry: Never true     Inability: Never true    Transportation needs     Medical: No     Non-medical: No   Tobacco Use    Smoking status: Never Smoker    Smokeless tobacco: Never Used   Substance and Sexual Activity    Alcohol use: No     Alcohol/week: 0.0 standard drinks     Frequency: Never     Drinks per session: Patient refused     Binge frequency: Never    Drug use: No    Sexual activity: Never   Lifestyle    Physical activity     Days per week: 0 days     Minutes per session: Patient refused    Stress: Not at all   Relationships    Social connections     Talks on phone: More than three times a week     Gets together: Patient refused     Attends [employer] service: Not on file     Active member of club or organization: Yes     Attends meetings of clubs or organizations: More than 4 times per year     Relationship status:    Other Topics Concern    Not on file   Social History Narrative    Her  passed away in March 2014. She lives with her  and has sitters and a son who helps. She has four children two of whom are twins. Her son is running the family business. Her children are all educated. She worked doing  for the [employer]. She has an education degree and is a retired . She started a travel agency for 15 years. She is a non-smoker and denies alcohol or substance  abuse.                        Family History   Problem Relation Age of Onset    Thyroid disease Father     Thyroid disease Son     Thyroid disease Daughter     Diabetes Neg Hx     Breast cancer Neg Hx     Colon cancer Neg Hx     Ovarian cancer Neg Hx        Outpatient Encounter Medications as of 8/4/2020   Medication Sig Dispense Refill    ANTIOX#10/OM3/DHA/EPA/LUT/ZEAX (I-CAPS ORAL) Take 2 capsules by mouth once daily.      calcium-vitamin D (OSCAL) 250 (625)-125 mg-unit per tablet Take 1 tablet by mouth 2 (two) times daily.      clotrimazole (LOTRIMIN) 1 % cream Apply topically 2 (two) times daily. 45 g 1    donepeziL (ARICEPT) 5 MG tablet Take 1 tablet (5 mg total) by mouth once daily. 30 tablet 5    levothyroxine (SYNTHROID) 75 MCG tablet Take 1 tablet (75 mcg total) by mouth before breakfast. 90 tablet 1    multivit-iron-FA-calcium &mins (ONE-A-DAY WOMENS FORMULA) 18 mg iron-400 mcg-500 mg Ca Tab Take 1 tablet by mouth once daily.       pravastatin (PRAVACHOL) 40 MG tablet TAKE 1 TABLET BY MOUTH EVERY DAY 90 tablet 1    sertraline (ZOLOFT) 100 MG tablet TAKE 1.5 TABLETS BY MOUTH EVERY  tablet 1    ofloxacin (OCUFLOX) 0.3 % ophthalmic solution Apply 2-3 drops in right ear twice daily for two weeks 5 mL 0    prednisoLONE acetate (PRED FORTE) 1 % DrpS Apply 2-3 drops to right ear twice daily for 2 weeks 5 mL 0     No facility-administered encounter medications on file as of 8/4/2020.        Physical Exam:    Vitals:    08/04/20 1508   BP: 124/71   Pulse: 105       Constitutional  General Appearance: well nourished, well-developed, alert, oriented, in no acute distress  Communication: ability, understanding, voice quality normal  Head and Face  Inspection: normocephalic, atraumatic, no scars, lesions or masses    Palpation: no stepoffs, sinus tenderness or masses  Parotid glands: no masses, stones, swelling or tenderness  Eyes  Ocular Motility / Alignment: normal alignment, motility, no  proptosis, enophthalmus or nystagmus  Conjunctiva: not injected  Eyelids: no entropion or ectropion, no edema  Ears  Hearing: speech reception thresholds grossly normal; Meza: right Rinne: AC>BC  bilaterally    External Ears: no auricle lesions, non-tender, mobile to palpation  Otoscopy:  Right Ear: cotton ball removed from conchal bowl - EAC with bloody drainage. See procedure note  Left Ear: cerumen obstructing view of tympanic membrane - see procedure note    Nose  External Nose: no lesions, tenderness, trauma or deformity  Oral Cavity / Oropharynx  Lips: upper and lower lips pink and moist  Gingiva: healthy  Oral Mucosa: moist, no mucosal lesions  Floor of Mouth: normal, no lesions  Tongue: moist, normal mobility, no lesions  Palate: soft and hard palates without lesions or ulcers  Oropharynx: tonsils and walls without erythema, exudate, lesions, fullness or obstruction  Neck  Inspection and Palpation: no erythema, induration, emphysema, tenderness or masses  Larynx and Trachea: normal position and mobility   Thyroid: no tenderness, enlargement or nodules  Submandibular Glands: no masses or tenderness  Lymphatic:  Anterior, Posterior, Submandibular, Submental, Supraclavicular: no lymphadenopathy present  Chest / Respiratory  Chest: no stridor or retractions, normal effort and expansion  Cardiovascular:  Pulses: 2+ radial pulses, regular rhythm and rate  Auscultation: deferred  Neurological  Cranial Nerves: grossly intact  General: no focal deficits  Psychiatric  Orientation: oriented to time, place and person  Mood and Affect: no depression, anxiety or agitation  Extremities  Inspection: moves all extremities well    Procedures:  Procedure: Microscopic exam with removal of cerumen and squamous debris    Indications: Cerumen, debris and bloody otorrhea obstructing view of tympanic membrane on right and cerumen on left    Anesthesia: None    Complications: None    Examination of the bilateral ear canals reveals  occlusive debris which prevents adequate visualization of the tympanic membrane.  Left ear with cerumen impaction.   Under microscopic magnification, removal of the cerumen was performed using a combination of curette, forceps, and/or suction. The tympanic membrane was adequately visible after the cleaning. It was intact without perforation or effusion. Os the right, the EAC is macerated with bloody otorrhea, debris. Removed with suction, forceps. Difficult to assess tympanic membrane due to significant maceration, blood and edema. Some tissue removed from canal - almost appeared to be macerated skin. Patient tolerated the procedure well    Pertinent Data:  ? LABS:   ? AUDIO:  ? PATH:  ? CULTURE:    I personally reviewed the following pertinent data at today's visit:    Imaging:   ? XRAY:  ? Ultrasound:  ? CT Scan:  ? MRI Scan:  ? PET/CT Scan:    I personally reviewed the following images:    Miscellaneous:     [place] Sleepiness Scale-     Reflux Symptom Index (RSI) -       Summary of Outside Records:      Assessment and Plan:  Chloe Foster is a 89 y.o. female with     Injury of tympanic membrane of right ear, initial encounter  -     ofloxacin (OCUFLOX) 0.3 % ophthalmic solution; Apply 2-3 drops in right ear twice daily for two weeks  Dispense: 5 mL; Refill: 0  -     prednisoLONE acetate (PRED FORTE) 1 % DrpS; Apply 2-3 drops to right ear twice daily for 2 weeks  Dispense: 5 mL; Refill: 0    Impacted cerumen of left ear  -     Ambulatory referral/consult to ENT    Otorrhea of right ear       Cerumen removed today from left. Advised to avoid the use of q-tips. Drops to right ear twice daily for two weeks. She is to put NOTHING in her ear. RTC in 2 weeks for repeat exam.        ROME Mariee MD  [facility]

## 2020-08-04 NOTE — PROCEDURES
Procedure: Microscopic exam with removal of cerumen and squamous debris    Indications: Cerumen, debris and bloody otorrhea obstructing view of tympanic membrane on right and cerumen on left    Anesthesia: None    Complications: None    Examination of the bilateral ear canals reveals occlusive debris which prevents adequate visualization of the tympanic membrane.  Left ear with cerumen impaction.   Under microscopic magnification, removal of the cerumen was performed using a combination of curette, forceps, and/or suction. The tympanic membrane was adequately visible after the cleaning. It was intact without perforation or effusion. Os the right, the EAC is macerated with bloody otorrhea, debris. Removed with suction, forceps. Difficult to assess tympanic membrane due to significant maceration, blood and edema. Some tissue removed from canal - almost appeared to be macerated skin. Patient tolerated the procedure well

## 2020-08-19 ENCOUNTER — OFFICE VISIT (OUTPATIENT)
Dept: OTOLARYNGOLOGY | Facility: CLINIC | Age: 85
End: 2020-08-19
Payer: MEDICARE

## 2020-08-19 VITALS
DIASTOLIC BLOOD PRESSURE: 71 MMHG | HEIGHT: 69 IN | WEIGHT: 179.69 LBS | SYSTOLIC BLOOD PRESSURE: 120 MMHG | HEART RATE: 93 BPM | BODY MASS INDEX: 26.62 KG/M2 | TEMPERATURE: 98 F

## 2020-08-19 DIAGNOSIS — S09.301A INJURY OF TYMPANIC MEMBRANE OF RIGHT EAR, INITIAL ENCOUNTER: Primary | ICD-10-CM

## 2020-08-19 DIAGNOSIS — S09.21XS: ICD-10-CM

## 2020-08-19 DIAGNOSIS — Z97.4 WEARS HEARING AID IN BOTH EARS: ICD-10-CM

## 2020-08-19 DIAGNOSIS — H90.A31 MIXED CONDUCTIVE AND SENSORINEURAL HEARING LOSS, UNILATERAL, RIGHT EAR WITH RESTRICTED HEARING ON THE CONTRALATERAL SIDE: ICD-10-CM

## 2020-08-19 PROCEDURE — 99999 PR PBB SHADOW E&M-EST. PATIENT-LVL III: CPT | Mod: PBBFAC,,, | Performed by: OTOLARYNGOLOGY

## 2020-08-19 PROCEDURE — 99214 PR OFFICE/OUTPT VISIT, EST, LEVL IV, 30-39 MIN: ICD-10-PCS | Mod: 25,S$PBB,, | Performed by: OTOLARYNGOLOGY

## 2020-08-19 PROCEDURE — 99213 OFFICE O/P EST LOW 20 MIN: CPT | Mod: PBBFAC,PN | Performed by: OTOLARYNGOLOGY

## 2020-08-19 PROCEDURE — 92504 EAR MICROSCOPY EXAMINATION: CPT | Mod: PBBFAC,PN | Performed by: OTOLARYNGOLOGY

## 2020-08-19 PROCEDURE — 92504 PR EAR MICROSCOPY EXAMINATION: ICD-10-PCS | Mod: S$PBB,,, | Performed by: OTOLARYNGOLOGY

## 2020-08-19 PROCEDURE — 92504 EAR MICROSCOPY EXAMINATION: CPT | Mod: S$PBB,,, | Performed by: OTOLARYNGOLOGY

## 2020-08-19 PROCEDURE — 99214 OFFICE O/P EST MOD 30 MIN: CPT | Mod: 25,S$PBB,, | Performed by: OTOLARYNGOLOGY

## 2020-08-19 PROCEDURE — 99999 PR PBB SHADOW E&M-EST. PATIENT-LVL III: ICD-10-PCS | Mod: PBBFAC,,, | Performed by: OTOLARYNGOLOGY

## 2020-08-19 RX ORDER — OFLOXACIN 3 MG/ML
SOLUTION/ DROPS OPHTHALMIC
Qty: 5 ML | Refills: 0 | Status: SHIPPED | OUTPATIENT
Start: 2020-08-19 | End: 2020-10-01 | Stop reason: SDUPTHER

## 2020-08-19 NOTE — PROGRESS NOTES
Otolaryngology Clinic Note    Chloe Foster  Encounter Date: 8/19/2020   YOB: 1929  Referring Physician: No referring provider defined for this encounter.   PCP: Maggie Vazquez MD    Chief Complaint:   Chief Complaint   Patient presents with    Follow-up     some improvement in right ear    Ear Fullness     left ear       HPI: Chloe Foster is a 89 y.o. female here with hearing loss that has been present for years but also reports using a qtip  Sunday night and went too far into her ear. Son noted bloody drainage afterwards. Afraid she stuck another qtip in last night but unsure. She denies any pain. He reports her hearing has definitely seemed worse than before.     Noise exposure: no  Ear protection: not applicable  Prior ear surgeries: no  Prior trauma: no  History of ear infections: no  Hearing aids: yes    Interval Update 8/19/20: Here today for follow up after sticking qtip in ear. Had lots of inflamatino and bloody drainage at last visit. Had a hard time making out TM. Given drops to use for 2 weeks. Son with her again today. Denies any further bleeding. Has noted some slight yellow serous looking drainage on cotton ball. Has tried to keep cotton ball taped to prevent her from sticking qtip in. She denies pain. Hearing still decreased.    Review of Systems   HENT: Positive for hearing loss. Negative for ear discharge and ear pain.         Review of patient's allergies indicates:   Allergen Reactions    Bactrim [sulfamethoxazole-trimethoprim] Other (See Comments)     She just felt bad       Past Medical History:   Diagnosis Date    Anxiety     Black eye 5/6/2013    Dyslipidemia     Hyperthyroidism     IGT (impaired glucose tolerance)     Mitral valve prolapse     Nontoxic multinodular goiter 2/18/2013    Primary hypothyroidism 5/18/2015    Shingles     left side of chest and back    Thyroid nodule 2/18/2013    Urinary tract infection        Past Surgical History:    Procedure Laterality Date    CATARACT EXTRACTION BILATERAL W/ ANTERIOR VITRECTOMY      DILATION AND CURETTAGE OF UTERUS      x2       Social History     Socioeconomic History    Marital status:      Spouse name: Not on file    Number of children: Not on file    Years of education: Not on file    Highest education level: Not on file   Occupational History    Occupation: Retired   Social Needs    Financial resource strain: Not very hard    Food insecurity     Worry: Never true     Inability: Never true    Transportation needs     Medical: No     Non-medical: No   Tobacco Use    Smoking status: Never Smoker    Smokeless tobacco: Never Used   Substance and Sexual Activity    Alcohol use: No     Alcohol/week: 0.0 standard drinks     Frequency: Never     Drinks per session: Patient refused     Binge frequency: Never    Drug use: No    Sexual activity: Never   Lifestyle    Physical activity     Days per week: 0 days     Minutes per session: Patient refused    Stress: Not at all   Relationships    Social connections     Talks on phone: More than three times a week     Gets together: Patient refused     Attends Rastafarian service: Not on file     Active member of club or organization: Yes     Attends meetings of clubs or organizations: More than 4 times per year     Relationship status:    Other Topics Concern    Not on file   Social History Narrative    Her  passed away in March 2014. She lives with her  and has sitters and a son who helps. She has four children two of whom are twins. Her son is running the family business. Her children are all educated. She worked doing  for the [employer]. She has an education degree and is a retired . She started a travel agency for 15 years. She is a non-smoker and denies alcohol or substance abuse.                        Family History   Problem Relation Age of Onset    Thyroid disease Father     Thyroid  disease Son     Thyroid disease Daughter     Diabetes Neg Hx     Breast cancer Neg Hx     Colon cancer Neg Hx     Ovarian cancer Neg Hx        Outpatient Encounter Medications as of 8/19/2020   Medication Sig Dispense Refill    ANTIOX#10/OM3/DHA/EPA/LUT/ZEAX (I-CAPS ORAL) Take 2 capsules by mouth once daily.      calcium-vitamin D (OSCAL) 250 (625)-125 mg-unit per tablet Take 1 tablet by mouth 2 (two) times daily.      clotrimazole (LOTRIMIN) 1 % cream Apply topically 2 (two) times daily. 45 g 1    donepeziL (ARICEPT) 5 MG tablet Take 1 tablet (5 mg total) by mouth once daily. 30 tablet 5    levothyroxine (SYNTHROID) 75 MCG tablet Take 1 tablet (75 mcg total) by mouth before breakfast. 90 tablet 1    multivit-iron-FA-calcium &mins (ONE-A-DAY WOMENS FORMULA) 18 mg iron-400 mcg-500 mg Ca Tab Take 1 tablet by mouth once daily.       pravastatin (PRAVACHOL) 40 MG tablet TAKE 1 TABLET BY MOUTH EVERY DAY 90 tablet 1    sertraline (ZOLOFT) 100 MG tablet TAKE 1.5 TABLETS BY MOUTH EVERY  tablet 1    ofloxacin (OCUFLOX) 0.3 % ophthalmic solution Apply 2-3 drops in right ear twice daily for several day 5 mL 0    prednisoLONE acetate (PRED FORTE) 1 % DrpS Apply 2-3 drops to right ear twice daily for 2 weeks (Patient not taking: Reported on 8/19/2020) 5 mL 0    [DISCONTINUED] ofloxacin (OCUFLOX) 0.3 % ophthalmic solution Apply 2-3 drops in right ear twice daily for two weeks (Patient not taking: Reported on 8/19/2020) 5 mL 0     No facility-administered encounter medications on file as of 8/19/2020.        Physical Exam:    Vitals:    08/19/20 1445   BP: 120/71   Pulse: 93   Temp: 97.5 °F (36.4 °C)       Constitutional  General Appearance: well nourished, well-developed, alert, oriented, in no acute distress  Communication: voice quality normal, limited by hearing loss, not wearing aids  Head and Face  Inspection: normocephalic, atraumatic, no scars, lesions or masses    Eyes  Ocular Motility / Alignment:  normal alignment, motility, no proptosis, enophthalmus or nystagmus  Conjunctiva: not injected  Eyelids: no entropion or ectropion, no edema  Ears  Hearing: speech reception thresholds grossly abnormal - Meza: right Rinne: BC>AC right AC>BC left    External Ears: no auricle lesions, non-tender, mobile to palpation  Otoscopy:  Right Ear: cerumen and squamous debris obstructing view of TM  Left Ear: no tympanic membrane lesions, perforations, or effusion, normal EAC  Nose  External Nose: no lesions, tenderness, trauma or deformity  Neck  Inspection and Palpation: no erythema, induration, emphysema, tenderness or masses  Chest / Respiratory  Chest: no stridor or retractions, normal effort and expansion  Neurological  General: no focal deficits  Psychiatric  Orientation: oriented to time, place and person  Mood and Affect: no depression, anxiety or agitation    Procedures:  Procedure: Microscopic exam with removal of cerumen and squamous debris    Indications: Cerumen, squamous debris and otorrhea obstructing view of tympanic membrane    Anesthesia: None    Complications: None    Examination of the right ear canal reveals occlusive debris which prevents adequate visualization of the tympanic membrane.    Under microscopic magnification, removal of the cerumen and debris was performed using a combination of curette, forceps, and/or suction. The tympanic membrane was adequately visible after the cleaning. Tympanic membrane with fairly large central perforation. No purulence. Patient tolerated the procedure well.    Pertinent Data:  ? LABS:   ? AUDIO:      ? PATH:  ? XRAY:    I personally reviewed the following pertinent data at today's visit: prior audiogram with mild sloping to severe SNHL. Today's Audiogram. Interpretation by me - right mixed loss, less predominantly SNHL - declined from prior, type A large volume tymp on right consistent with perforation      Imaging:   ? Ultrasound:  ? CT Scan:  ? MRI Scan:  ? PET/CT  Scan:    I personally reviewed the following images:    Miscellaneous:     [place] Sleepiness Scale-     Reflux Symptom Index (RSI) -     Summary of Outside Records:      Assessment and Plan:  Chloe Foster is a 89 y.o. female with       Injury of tympanic membrane of right ear, initial encounter  -     ofloxacin (OCUFLOX) 0.3 % ophthalmic solution; Apply 2-3 drops in right ear twice daily for several day  Dispense: 5 mL; Refill: 0    Perforation of tympanic membrane, traumatic, right, sequela  -     AUDIOGRAM (AIR & BONE); Future    Mixed conductive and sensorineural hearing loss, unilateral, right ear with restricted hearing on the contralateral side    Wears hearing aid in both ears     Use floxin drops for a few more days to clear ear. No acute infection but had lots of debris. Refilled today. RTC in 3 months to recheck perforation or sooner if pain, drainage or other issues arise. Continue hearing aids. Has appt to have re-adjusted. No qtips or objects in ear. Annual audiogram.          MD Marie Dailyscaes [facility]

## 2020-08-19 NOTE — PROCEDURES
Procedure: Microscopic exam with removal of cerumen and squamous debris    Indications: Cerumen, squamous debris and otorrhea obstructing view of tympanic membrane    Anesthesia: None    Complications: None    Examination of the right ear canal reveals occlusive debris which prevents adequate visualization of the tympanic membrane.    Under microscopic magnification, removal of the cerumen and debris was performed using a combination of curette, forceps, and/or suction. The tympanic membrane was adequately visible after the cleaning. Tympanic membrane with fairly large central perforation. No purulence. Patient tolerated the procedure well.

## 2020-08-31 ENCOUNTER — CLINICAL SUPPORT (OUTPATIENT)
Dept: OTOLARYNGOLOGY | Facility: CLINIC | Age: 85
End: 2020-08-31
Payer: MEDICARE

## 2020-08-31 DIAGNOSIS — H90.A31 MIXED CONDUCTIVE AND SENSORINEURAL HEARING LOSS, UNILATERAL, RIGHT EAR WITH RESTRICTED HEARING ON THE CONTRALATERAL SIDE: Primary | ICD-10-CM

## 2020-08-31 PROCEDURE — 99499 UNLISTED E&M SERVICE: CPT | Mod: S$PBB,,, | Performed by: AUDIOLOGIST-HEARING AID FITTER

## 2020-08-31 PROCEDURE — 99499 NO LOS: ICD-10-PCS | Mod: S$PBB,,, | Performed by: AUDIOLOGIST-HEARING AID FITTER

## 2020-08-31 NOTE — PROGRESS NOTES
Prisca Nagel, CCC-A  [facility] [address]  [address]  [place] [address]      Patient: Chloe Foster   MRN: [id number]   : 3/25/1929  HATHAWAY: 2020    HEARING AID FOLLOW-UP    Chloe Foster was seen on the above date for a hearing aid follow up. Patient was previously fit binaurally at [facility] with [employer] in . I confirmed with [employer] that they are out of warranty although they still qualify for a repair warranty.  on left ear was replaced. Wax filters and domes were replaced. Programmed based on most recent hearing test. I observed patient to be hearing much better.     A 3 month follow up appointment was scheduled.     Prisca Nagel, CCC-A

## 2020-09-02 ENCOUNTER — OFFICE VISIT (OUTPATIENT)
Dept: ENDOCRINOLOGY | Facility: CLINIC | Age: 85
End: 2020-09-02
Payer: MEDICARE

## 2020-09-02 DIAGNOSIS — E04.2 NONTOXIC MULTINODULAR GOITER: Primary | ICD-10-CM

## 2020-09-02 DIAGNOSIS — E89.0 POSTABLATIVE HYPOTHYROIDISM: ICD-10-CM

## 2020-09-02 PROCEDURE — 99213 PR OFFICE/OUTPT VISIT, EST, LEVL III, 20-29 MIN: ICD-10-PCS | Mod: 95,,, | Performed by: INTERNAL MEDICINE

## 2020-09-02 PROCEDURE — 99213 OFFICE O/P EST LOW 20 MIN: CPT | Mod: 95,,, | Performed by: INTERNAL MEDICINE

## 2020-09-02 RX ORDER — LEVOTHYROXINE SODIUM 75 UG/1
75 TABLET ORAL
Qty: 90 TABLET | Refills: 3 | Status: SHIPPED | OUTPATIENT
Start: 2020-09-02 | End: 2021-10-21 | Stop reason: SDUPTHER

## 2020-09-02 NOTE — ASSESSMENT & PLAN NOTE
--patient with history of thyroid nodule  --No risk factors for thyroid cancer  --No compressive symptoms  --Had benign FNA of right lobe nodule in 2015  --The nodule had not changed significantly on repeat ultrasound in 2018  --Will repeat thyroid ultrasound now

## 2020-09-02 NOTE — ASSESSMENT & PLAN NOTE
--patient presenting with postablative hypothyroidism  --Clinically and biochemically euthyroid  --Will continue levothyroxine 75 mcg once daily (refill sent to pharmacy)

## 2020-09-28 ENCOUNTER — PATIENT MESSAGE (OUTPATIENT)
Dept: OTOLARYNGOLOGY | Facility: CLINIC | Age: 85
End: 2020-09-28

## 2020-09-29 ENCOUNTER — TELEPHONE (OUTPATIENT)
Dept: OTOLARYNGOLOGY | Facility: CLINIC | Age: 85
End: 2020-09-29

## 2020-09-29 NOTE — TELEPHONE ENCOUNTER
Patients son asked via the portal for a phone call back to discuss hearing issue. Returned call and scheduled an appointment for this Thursday, 10/1 at 4pm with audiology and 5pm with Dr. Zarco. Patient's son thanked me for calling.

## 2020-09-30 NOTE — PROGRESS NOTES
Otolaryngology Clinic Note    Chloe Foster  Encounter Date: 10/1/2020   YOB: 1929  Referring Physician: Aaareferral Self  No address on file   PCP: Maggie Vazquez MD (Inactive)    Chief Complaint:   Chief Complaint   Patient presents with    Ear Drainage     right x1 week       HPI: Chloe Foster is a 89 y.o. female here with hearing loss that has been present for years but also noted to have traumatic R TM perforation after sticking qtip in ear. Drainage had cleared up at last visit 8/19 after using drops.    Today she is here again with her son who reports hearing seemed worse over last week. Also noted some right ear drainage about 5 days ago but has not seemed to be draining for the last few. Had battery that was dead on left and damaged right  which was replaced prior to visit.      Noise exposure: no  Ear protection: not applicable  Prior ear surgeries: no  Prior trauma: no  History of ear infections: no  Hearing aids: yes        Review of Systems   HENT: Positive for hearing loss. Negative for ear discharge and ear pain.         Review of patient's allergies indicates:   Allergen Reactions    Bactrim [sulfamethoxazole-trimethoprim] Other (See Comments)     She just felt bad       Past Medical History:   Diagnosis Date    Anxiety     Black eye 5/6/2013    Dyslipidemia     Hyperthyroidism     IGT (impaired glucose tolerance)     Mitral valve prolapse     Nontoxic multinodular goiter 2/18/2013    Primary hypothyroidism 5/18/2015    Shingles     left side of chest and back    Thyroid nodule 2/18/2013    Urinary tract infection        Past Surgical History:   Procedure Laterality Date    CATARACT EXTRACTION BILATERAL W/ ANTERIOR VITRECTOMY      DILATION AND CURETTAGE OF UTERUS      x2       Social History     Socioeconomic History    Marital status:      Spouse name: Not on file    Number of children: Not on file    Years of education: Not on file     Highest education level: Not on file   Occupational History    Occupation: Retired   Social Needs    Financial resource strain: Not very hard    Food insecurity     Worry: Never true     Inability: Never true    Transportation needs     Medical: No     Non-medical: No   Tobacco Use    Smoking status: Never Smoker    Smokeless tobacco: Never Used   Substance and Sexual Activity    Alcohol use: No     Alcohol/week: 0.0 standard drinks     Frequency: Never     Drinks per session: Patient refused     Binge frequency: Never    Drug use: No    Sexual activity: Never   Lifestyle    Physical activity     Days per week: 0 days     Minutes per session: Patient refused    Stress: Not at all   Relationships    Social connections     Talks on phone: More than three times a week     Gets together: Patient refused     Attends Alevism service: Not on file     Active member of club or organization: Yes     Attends meetings of clubs or organizations: More than 4 times per year     Relationship status:    Other Topics Concern    Not on file   Social History Narrative    Her  passed away in March 2014. She lives with her  and has sitters and a son who helps. She has four children two of whom are twins. Her son is running the family business. Her children are all educated. 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 She has an education degree and is a retired . She started a travel agency for 15 years. She is a non-smoker and denies alcohol or substance abuse.                        Family History   Problem Relation Age of Onset    Thyroid disease Father     Thyroid disease Son     Thyroid disease Daughter     Diabetes Neg Hx     Breast cancer Neg Hx     Colon cancer Neg Hx     Ovarian cancer Neg Hx        Outpatient Encounter Medications as of 10/1/2020   Medication Sig Dispense Refill    ANTIOX#10/OM3/DHA/EPA/LUT/ZEAX (I-CAPS ORAL) Take 2 capsules by mouth once  daily.      calcium-vitamin D (OSCAL) 250 (625)-125 mg-unit per tablet Take 1 tablet by mouth 2 (two) times daily.      clotrimazole (LOTRIMIN) 1 % cream Apply topically 2 (two) times daily. 45 g 1    donepeziL (ARICEPT) 5 MG tablet Take 1 tablet (5 mg total) by mouth once daily. 30 tablet 5    levothyroxine (SYNTHROID) 75 MCG tablet Take 1 tablet (75 mcg total) by mouth before breakfast. 90 tablet 3    multivit-iron-FA-calcium &mins (ONE-A-DAY WOMENS FORMULA) 18 mg iron-400 mcg-500 mg Ca Tab Take 1 tablet by mouth once daily.       pravastatin (PRAVACHOL) 40 MG tablet TAKE 1 TABLET BY MOUTH EVERY DAY 90 tablet 1    sertraline (ZOLOFT) 100 MG tablet TAKE 1.5 TABLETS BY MOUTH EVERY  tablet 1    ofloxacin (OCUFLOX) 0.3 % ophthalmic solution Apply 2-3 drops in right ear twice daily for 10 days 5 mL 0    prednisoLONE acetate (PRED FORTE) 1 % DrpS Apply 2-3 drops to right ear twice daily for 10 days 5 mL 0    triamcinolone acetonide 0.1% (KENALOG) 0.1 % ointment Apply topically 2 (two) times daily. To left ear for 10 days 15 g 0    [DISCONTINUED] ofloxacin (OCUFLOX) 0.3 % ophthalmic solution Apply 2-3 drops in right ear twice daily for several days 5 mL 0    [DISCONTINUED] prednisoLONE acetate (PRED FORTE) 1 % DrpS Apply 2-3 drops to right ear twice daily for 2 weeks (Patient not taking: Reported on 8/19/2020) 5 mL 0     No facility-administered encounter medications on file as of 10/1/2020.        Physical Exam:    Vitals:    10/01/20 1638   BP: 117/69   Pulse: 101       Constitutional  General Appearance: well nourished, well-developed, alert, oriented, in no acute distress  Communication: voice quality normal, limited by hearing loss, not wearing aids  Head and Face  Inspection: normocephalic, atraumatic, no scars, lesions or masses    Eyes  Ocular Motility / Alignment: normal alignment, motility, no proptosis, enophthalmus or nystagmus  Conjunctiva: not injected  Eyelids: no entropion or ectropion,  no edema  Ears  Hearing: speech reception thresholds grossly abnormal - Meza: right Rinne: BC>AC right AC>BC left    External Ears: no auricle lesions, non-tender, mobile to palpation  Otoscopy:  Right Ear: purulent drainage in EAC  Left Ear: cerumen obstructing view of tympanic membrane - see procedure note    Nose  External Nose: no lesions, tenderness, trauma or deformity  Neck  Inspection and Palpation: no erythema, induration, emphysema, tenderness or masses  Chest / Respiratory  Chest: no stridor or retractions, normal effort and expansion  Neurological  General: no focal deficits  Psychiatric  Orientation: oriented to time, place and person  Mood and Affect: no depression, anxiety or agitation    Procedures:  Procedure: Microscopic exam with removal of cerumen and debris    Indications: Cerumen, squamous debris and otorrhea obstructing view of tympanic membrane    Anesthesia: None    Complications: None    Examination of the left ear canal reveals occlusive cerumen which prevents adequate visualization of the tympanic membrane.   Under microscopic magnification, removal of the cerumen was performed using a combination of curette, forceps, and/or suction. The tympanic membrane was adequately visible after the cleaning. Intact without perforation or effusion. Right ear canal with purulent otorrhea suctioned. Tympanic membrane inflamed and thickened. Small central perforation. Patient tolerated the procedure well    Pertinent Data:  ? LABS:   ? AUDIO:      ? PATH:  ? XRAY:    I personally reviewed the following pertinent data at today's visit: Audiogram Interpretation by me - right mixed loss, less predominantly SNHL - declined from prior, type A large volume tymp on right consistent with perforation      Imaging:   ? Ultrasound:  ? CT Scan:  ? MRI Scan:  ? PET/CT Scan:    I personally reviewed the following images:    Miscellaneous:     [place] Sleepiness Scale-     Reflux Symptom Index (RSI) -     Summary of  Outside Records:      Assessment and Plan:  Chloe Foster is a 89 y.o. female with       Mixed conductive and sensorineural hearing loss, unilateral, right ear with restricted hearing on the contralateral side    Perforation of tympanic membrane, traumatic, right, sequela    Injury of tympanic membrane of right ear, initial encounter  -     ofloxacin (OCUFLOX) 0.3 % ophthalmic solution; Apply 2-3 drops in right ear twice daily for 10 days  Dispense: 5 mL; Refill: 0  -     prednisoLONE acetate (PRED FORTE) 1 % DrpS; Apply 2-3 drops to right ear twice daily for 10 days  Dispense: 5 mL; Refill: 0    Dryness of ear canal, left  -     triamcinolone acetonide 0.1% (KENALOG) 0.1 % ointment; Apply topically 2 (two) times daily. To left ear for 10 days  Dispense: 15 g; Refill: 0     Drops to right ear for 10 days. Ointment to external left ear to help dryness/erythema for 10 days. Then switch to mineral oil or baby oil in left. Keep right ear dry. Keep objects out of ears. RTC 2 weeks to ensure resolution          E. Grier Gardner, MD [facility]

## 2020-10-01 ENCOUNTER — CLINICAL SUPPORT (OUTPATIENT)
Dept: OTOLARYNGOLOGY | Facility: CLINIC | Age: 85
End: 2020-10-01
Payer: MEDICARE

## 2020-10-01 ENCOUNTER — OFFICE VISIT (OUTPATIENT)
Dept: OTOLARYNGOLOGY | Facility: CLINIC | Age: 85
End: 2020-10-01
Payer: MEDICARE

## 2020-10-01 VITALS
DIASTOLIC BLOOD PRESSURE: 69 MMHG | WEIGHT: 181.19 LBS | BODY MASS INDEX: 26.84 KG/M2 | HEART RATE: 101 BPM | SYSTOLIC BLOOD PRESSURE: 117 MMHG | HEIGHT: 69 IN

## 2020-10-01 DIAGNOSIS — S09.301A INJURY OF TYMPANIC MEMBRANE OF RIGHT EAR, INITIAL ENCOUNTER: ICD-10-CM

## 2020-10-01 DIAGNOSIS — S09.21XS: ICD-10-CM

## 2020-10-01 DIAGNOSIS — S09.21XS: Primary | ICD-10-CM

## 2020-10-01 DIAGNOSIS — H90.A31 MIXED CONDUCTIVE AND SENSORINEURAL HEARING LOSS, UNILATERAL, RIGHT EAR WITH RESTRICTED HEARING ON THE CONTRALATERAL SIDE: Primary | ICD-10-CM

## 2020-10-01 DIAGNOSIS — H61.892: ICD-10-CM

## 2020-10-01 DIAGNOSIS — H90.A31 MIXED CONDUCTIVE AND SENSORINEURAL HEARING LOSS, UNILATERAL, RIGHT EAR WITH RESTRICTED HEARING ON THE CONTRALATERAL SIDE: ICD-10-CM

## 2020-10-01 PROCEDURE — 99499 UNLISTED E&M SERVICE: CPT | Mod: S$PBB,,, | Performed by: AUDIOLOGIST-HEARING AID FITTER

## 2020-10-01 PROCEDURE — 99499 NO LOS: ICD-10-PCS | Mod: S$PBB,,, | Performed by: AUDIOLOGIST-HEARING AID FITTER

## 2020-10-01 PROCEDURE — 92504 EAR MICROSCOPY EXAMINATION: CPT | Mod: PBBFAC,PN | Performed by: OTOLARYNGOLOGY

## 2020-10-01 PROCEDURE — 99999 PR PBB SHADOW E&M-EST. PATIENT-LVL I: ICD-10-PCS | Mod: PBBFAC,,, | Performed by: AUDIOLOGIST-HEARING AID FITTER

## 2020-10-01 PROCEDURE — 99214 OFFICE O/P EST MOD 30 MIN: CPT | Mod: 25,S$PBB,, | Performed by: OTOLARYNGOLOGY

## 2020-10-01 PROCEDURE — 92504 PR EAR MICROSCOPY EXAMINATION: ICD-10-PCS | Mod: S$PBB,,, | Performed by: OTOLARYNGOLOGY

## 2020-10-01 PROCEDURE — 99999 PR PBB SHADOW E&M-EST. PATIENT-LVL IV: CPT | Mod: PBBFAC,,, | Performed by: OTOLARYNGOLOGY

## 2020-10-01 PROCEDURE — 99999 PR PBB SHADOW E&M-EST. PATIENT-LVL I: CPT | Mod: PBBFAC,,, | Performed by: AUDIOLOGIST-HEARING AID FITTER

## 2020-10-01 PROCEDURE — 99214 PR OFFICE/OUTPT VISIT, EST, LEVL IV, 30-39 MIN: ICD-10-PCS | Mod: 25,S$PBB,, | Performed by: OTOLARYNGOLOGY

## 2020-10-01 PROCEDURE — 99211 OFF/OP EST MAY X REQ PHY/QHP: CPT | Mod: PBBFAC,27,PN | Performed by: AUDIOLOGIST-HEARING AID FITTER

## 2020-10-01 PROCEDURE — 92504 EAR MICROSCOPY EXAMINATION: CPT | Mod: S$PBB,,, | Performed by: OTOLARYNGOLOGY

## 2020-10-01 PROCEDURE — 99999 PR PBB SHADOW E&M-EST. PATIENT-LVL IV: ICD-10-PCS | Mod: PBBFAC,,, | Performed by: OTOLARYNGOLOGY

## 2020-10-01 PROCEDURE — 99214 OFFICE O/P EST MOD 30 MIN: CPT | Mod: PBBFAC,PN | Performed by: OTOLARYNGOLOGY

## 2020-10-01 RX ORDER — PREDNISOLONE ACETATE 10 MG/ML
SUSPENSION/ DROPS OPHTHALMIC
Qty: 5 ML | Refills: 0 | Status: SHIPPED | OUTPATIENT
Start: 2020-10-01 | End: 2020-11-30 | Stop reason: SDUPTHER

## 2020-10-01 RX ORDER — TRIAMCINOLONE ACETONIDE 1 MG/G
OINTMENT TOPICAL 2 TIMES DAILY
Qty: 15 G | Refills: 0 | Status: SHIPPED | OUTPATIENT
Start: 2020-10-01 | End: 2020-10-11

## 2020-10-01 RX ORDER — OFLOXACIN 3 MG/ML
SOLUTION/ DROPS OPHTHALMIC
Qty: 5 ML | Refills: 0 | Status: SHIPPED | OUTPATIENT
Start: 2020-10-01 | End: 2020-11-30 | Stop reason: SDUPTHER

## 2020-10-01 NOTE — PROCEDURES
Procedure: Microscopic exam with removal of cerumen and debris    Indications: Cerumen, squamous debris and otorrhea obstructing view of tympanic membrane    Anesthesia: None    Complications: None    Examination of the left ear canal reveals occlusive cerumen which prevents adequate visualization of the tympanic membrane.   Under microscopic magnification, removal of the cerumen was performed using a combination of curette, forceps, and/or suction. The tympanic membrane was adequately visible after the cleaning. Intact without perforation or effusion. Right ear canal with purulent otorrhea suctioned. Tympanic membrane inflamed and thickened. Small central perforation. Patient tolerated the procedure well

## 2020-10-01 NOTE — PATIENT INSTRUCTIONS
Use drops in right ear for 10 days. Use ointment in left ear for 10 days. After that use several drops of mineral oil or baby oil a few times a week on the left

## 2020-10-01 NOTE — PROGRESS NOTES
Prisca Nagel, CCC-A  [facility] [address]  [address]  [place] [address]      Patient: Chloe Foster   MRN: [id number]   : 3/25/1929  HATHAWAY: 10/01/2020    HEARING AID FOLLOW-UP    Chloe Foster was seen on the above date for a hearing aid follow up. Patient's son reported that her hearing aids have not been working and that she has drainage from her right ear the last 5 days. Right  replaced as it was damaged; I suspect from the drainage. Replaced battery in the left hearing aid. Wax filters clogged. Listening check good.    Rec:  Change wax filters about 1x/month.  Change batteries 1x/week.  Do not wear hearing aid if ear is actively draining.     Follow up as needed.     Prisca Nagel, CCC-A

## 2020-10-03 ENCOUNTER — IMMUNIZATION (OUTPATIENT)
Dept: FAMILY MEDICINE | Facility: CLINIC | Age: 85
End: 2020-10-03
Payer: MEDICARE

## 2020-10-03 PROCEDURE — 99999 PR PBB SHADOW E&M-EST. PATIENT-LVL I: ICD-10-PCS | Mod: PBBFAC,,,

## 2020-10-03 PROCEDURE — G0008 ADMIN INFLUENZA VIRUS VAC: HCPCS | Mod: PBBFAC,PN

## 2020-10-03 PROCEDURE — 90694 VACC AIIV4 NO PRSRV 0.5ML IM: CPT | Mod: PBBFAC,PN

## 2020-10-03 PROCEDURE — 99211 OFF/OP EST MAY X REQ PHY/QHP: CPT | Mod: PBBFAC,PN

## 2020-10-03 PROCEDURE — 99999 PR PBB SHADOW E&M-EST. PATIENT-LVL I: CPT | Mod: PBBFAC,,,

## 2020-10-15 ENCOUNTER — OFFICE VISIT (OUTPATIENT)
Dept: FAMILY MEDICINE | Facility: CLINIC | Age: 85
End: 2020-10-15
Payer: MEDICARE

## 2020-10-15 VITALS
TEMPERATURE: 98 F | RESPIRATION RATE: 16 BRPM | DIASTOLIC BLOOD PRESSURE: 64 MMHG | HEART RATE: 90 BPM | OXYGEN SATURATION: 97 % | WEIGHT: 178 LBS | HEIGHT: 69 IN | BODY MASS INDEX: 26.36 KG/M2 | SYSTOLIC BLOOD PRESSURE: 130 MMHG

## 2020-10-15 DIAGNOSIS — H61.892: ICD-10-CM

## 2020-10-15 DIAGNOSIS — G89.29 CHRONIC PAIN OF LEFT KNEE: Primary | ICD-10-CM

## 2020-10-15 DIAGNOSIS — E89.0 POSTABLATIVE HYPOTHYROIDISM: ICD-10-CM

## 2020-10-15 DIAGNOSIS — M25.562 CHRONIC PAIN OF LEFT KNEE: Primary | ICD-10-CM

## 2020-10-15 DIAGNOSIS — E78.00 HYPERCHOLESTEROLEMIA: ICD-10-CM

## 2020-10-15 DIAGNOSIS — R21 RASH: ICD-10-CM

## 2020-10-15 DIAGNOSIS — R73.09 ABNORMAL GLUCOSE: ICD-10-CM

## 2020-10-15 PROCEDURE — 99999 PR PBB SHADOW E&M-EST. PATIENT-LVL V: CPT | Mod: PBBFAC,,, | Performed by: INTERNAL MEDICINE

## 2020-10-15 PROCEDURE — 99213 PR OFFICE/OUTPT VISIT, EST, LEVL III, 20-29 MIN: ICD-10-PCS | Mod: S$PBB,,, | Performed by: INTERNAL MEDICINE

## 2020-10-15 PROCEDURE — 99213 OFFICE O/P EST LOW 20 MIN: CPT | Mod: S$PBB,,, | Performed by: INTERNAL MEDICINE

## 2020-10-15 PROCEDURE — 99999 PR PBB SHADOW E&M-EST. PATIENT-LVL V: ICD-10-PCS | Mod: PBBFAC,,, | Performed by: INTERNAL MEDICINE

## 2020-10-15 PROCEDURE — 99215 OFFICE O/P EST HI 40 MIN: CPT | Mod: PBBFAC,PN | Performed by: INTERNAL MEDICINE

## 2020-10-15 RX ORDER — TRIAMCINOLONE ACETONIDE 1 MG/G
OINTMENT TOPICAL 2 TIMES DAILY
Start: 2020-10-15 | End: 2020-11-05 | Stop reason: SDUPTHER

## 2020-10-15 NOTE — ASSESSMENT & PLAN NOTE
rec consistent conservative tx with ice, nsaids+ppi, compression, rest, can also use prn tylenol  If not improved would rec eval by ortho for poss knee injection to restore mobility

## 2020-10-15 NOTE — PROGRESS NOTES
[facility]    Chief Complaint      Chief Complaint   Patient presents with    Knee Pain     left knee pain for 6 to 8 weeks    Spot     red patch on left arm     History of Present Illness      Chloe Foster is a 89 y.o. female who presents today for chief complaint knee pain . Patient is new to me.    PCP: Michelle Hopson MD  Patient comes to appointment with her son Celestino.     HPI   Left knee /popliteal fossa, biofreeze, nsaids, has done heat, worse with weight bearing and on knee flexion, no problem on extension, was walking in her neighborhood but has stopped bc of pain  Had a previous  at gym     Skin lesion on left forearm, macule that blanches/telangectasia with rolled edges, no pearly  Active Problem List with Overview Notes    Diagnosis Date Noted    Rash 10/15/2020    Chronic pain of left knee 10/15/2020    Abnormal glucose 03/06/2020    Recurrent UTI 05/05/2015    Bladder cystocele 05/05/2015    Hearing loss, sensorineural 02/24/2015    Nontoxic multinodular goiter 02/18/2013    Postablative hypothyroidism 01/16/2013    Hypercholesterolemia 01/16/2013    IGT (impaired glucose tolerance)        Health Maintenance   Topic Date Due    Lipid Panel  03/13/2021    TETANUS VACCINE  10/24/2029    Pneumococcal Vaccine (65+ Low/Medium Risk)  Completed       Past Medical History:   Diagnosis Date    Anxiety     Black eye 5/6/2013    Dyslipidemia     Hyperthyroidism     IGT (impaired glucose tolerance)     Mitral valve prolapse     Nontoxic multinodular goiter 2/18/2013    Primary hypothyroidism 5/18/2015    Shingles     left side of chest and back    Thyroid nodule 2/18/2013    Urinary tract infection        Past Surgical History:   Procedure Laterality Date    CATARACT EXTRACTION BILATERAL W/ ANTERIOR VITRECTOMY      DILATION AND CURETTAGE OF UTERUS      x2       family history includes Thyroid disease in her daughter, father, and  son.    Social History     Tobacco Use    Smoking status: Never Smoker    Smokeless tobacco: Never Used   Substance Use Topics    Alcohol use: No     Alcohol/week: 0.0 standard drinks     Frequency: Never     Drinks per session: Patient refused     Binge frequency: Never    Drug use: No       ROS     Outpatient Encounter Medications as of 10/15/2020   Medication Sig Dispense Refill    ANTIOX#10/OM3/DHA/EPA/LUT/ZEAX (I-CAPS ORAL) Take 2 capsules by mouth once daily.      calcium-vitamin D (OSCAL) 250 (625)-125 mg-unit per tablet Take 1 tablet by mouth 2 (two) times daily.      clotrimazole (LOTRIMIN) 1 % cream Apply topically 2 (two) times daily. 45 g 1    donepeziL (ARICEPT) 5 MG tablet Take 1 tablet (5 mg total) by mouth once daily. 30 tablet 5    levothyroxine (SYNTHROID) 75 MCG tablet Take 1 tablet (75 mcg total) by mouth before breakfast. 90 tablet 3    multivit-iron-FA-calcium &mins (ONE-A-DAY WOMENS FORMULA) 18 mg iron-400 mcg-500 mg Ca Tab Take 1 tablet by mouth once daily.       ofloxacin (OCUFLOX) 0.3 % ophthalmic solution Apply 2-3 drops in right ear twice daily for 10 days 5 mL 0    pravastatin (PRAVACHOL) 40 MG tablet TAKE 1 TABLET BY MOUTH EVERY DAY 90 tablet 1    prednisoLONE acetate (PRED FORTE) 1 % DrpS Apply 2-3 drops to right ear twice daily for 10 days 5 mL 0    sertraline (ZOLOFT) 100 MG tablet TAKE 1.5 TABLETS BY MOUTH EVERY  tablet 1    triamcinolone acetonide 0.1% (KENALOG) 0.1 % ointment Apply topically 2 (two) times daily. To left ear for 10 days 15 g 0    triamcinolone acetonide 0.1% (KENALOG) 0.1 % ointment Apply topically 2 (two) times daily.       No facility-administered encounter medications on file as of 10/15/2020.         Review of patient's allergies indicates:   Allergen Reactions    Bactrim [sulfamethoxazole-trimethoprim] Other (See Comments)     She just felt bad           Physical Exam      Vital Signs  Temp: 97.5 °F (36.4 °C)  Temp src: Oral  Pulse:  "90  Resp: 16  SpO2: 97 %  BP: 130/64  BP Location: Left arm  Patient Position: Sitting  Pain Score: 0-No pain  Height and Weight  Height: 5' 9" (175.3 cm)  Weight: 80.7 kg (178 lb 0.3 oz)  BSA (Calculated - sq m): 1.98 sq meters  BMI (Calculated): 26.3  Weight in (lb) to have BMI = 25: 168.9]    Physical Exam     Laboratory:  CBC:  No results for input(s): WBC, RBC, HGB, HCT, PLT, MCV, MCH, MCHC in the last 2160 hours.  CMP:  No results for input(s): GLU, CALCIUM, ALBUMIN, PROT, NA, K, CO2, CL, BUN, ALKPHOS, ALT, AST, BILITOT in the last 2160 hours.    Invalid input(s): CREATININ  URINALYSIS:  No results for input(s): COLORU, CLARITYU, SPECGRAV, PHUR, PROTEINUA, GLUCOSEU, BILIRUBINCON, BLOODU, WBCU, RBCU, BACTERIA, MUCUS, NITRITE, LEUKOCYTESUR, UROBILINOGEN, HYALINECASTS in the last 2160 hours.   LIPIDS:  No results for input(s): TSH, HDL, CHOL, TRIG, LDLCALC, CHOLHDL, NONHDLCHOL, TOTALCHOLEST in the last 2160 hours.  TSH:  No results for input(s): TSH in the last 2160 hours.  A1C:  No results for input(s): HGBA1C in the last 2160 hours.    Radiology:      Assessment/Plan     Chloe Foster is a 89 y.o.female with:    Chronic pain of left knee  rec consistent conservative tx with ice, nsaids+ppi, compression, rest, can also use prn tylenol  If not improved would rec eval by ortho for poss knee injection to restore mobility     Rash  Annular blanching lesion with raised borders and blanching on exam, would like to rule out basal cell, ref to derm       Orders Placed This Encounter   Procedures    Lipid Panel     Standing Status:   Future     Standing Expiration Date:   12/14/2021    T4, Free     Standing Status:   Future     Standing Expiration Date:   12/15/2021    Ambulatory referral/consult to Dermatology     Standing Status:   Future     Standing Expiration Date:   11/15/2021     Referral Priority:   Routine     Referral Type:   Consultation     Referral Reason:   Specialty Services Required     Referred to " Provider:   Tatyana Bettencourt MD     Requested Specialty:   Dermatology     Number of Visits Requested:   1       Use of the [employer] Patient Portal discussed and encouraged during today's visit  -Continue current medications and maintain follow up with specialists.  Return to clinic in 6 months with labs prior   Future Appointments   Date Time Provider Department Center   10/28/2020  1:20 PM Griselda Murphy MD [facility]   11/19/2020  2:40 PM [facility]   11/19/2020  3:30 PM DINA Sheppard [facility]   4/9/2021 10:30 AM [facility]   4/15/2021  3:00 PM Michelle Hopson MD [facility] Hopson MD  10/15/2020 1:46 PM    Primary Care Internal Medicine - [facility]

## 2020-10-15 NOTE — ASSESSMENT & PLAN NOTE
Annular blanching lesion with raised borders and blanching on exam, would like to rule out basal cell, ref to derm

## 2020-11-04 ENCOUNTER — PATIENT MESSAGE (OUTPATIENT)
Dept: FAMILY MEDICINE | Facility: CLINIC | Age: 85
End: 2020-11-04

## 2020-11-04 DIAGNOSIS — G89.29 CHRONIC PAIN OF LEFT KNEE: Primary | ICD-10-CM

## 2020-11-04 DIAGNOSIS — M25.562 CHRONIC PAIN OF LEFT KNEE: Primary | ICD-10-CM

## 2020-11-04 DIAGNOSIS — R21 RASH: ICD-10-CM

## 2020-11-05 RX ORDER — TRIAMCINOLONE ACETONIDE 1 MG/G
OINTMENT TOPICAL 2 TIMES DAILY
Qty: 30 G | Refills: 2 | Status: SHIPPED | OUTPATIENT
Start: 2020-11-05 | End: 2021-03-30 | Stop reason: SDUPTHER

## 2020-11-10 ENCOUNTER — TELEPHONE (OUTPATIENT)
Dept: ORTHOPEDICS | Facility: CLINIC | Age: 85
End: 2020-11-10

## 2020-11-10 DIAGNOSIS — M25.569 KNEE PAIN, UNSPECIFIED CHRONICITY, UNSPECIFIED LATERALITY: Primary | ICD-10-CM

## 2020-11-10 NOTE — TELEPHONE ENCOUNTER
----- Message from Chari Sims sent at 11/10/2020  8:37 AM CST -----  Contact: Justin Yin (Son)  Justin Yin (Son)is wanting to get the pt in today this afternoon possibly because the pt fell was having problems with her knee,had this problem 1 year ago.....[phone]

## 2020-11-13 ENCOUNTER — TELEPHONE (OUTPATIENT)
Dept: FAMILY MEDICINE | Facility: CLINIC | Age: 85
End: 2020-11-13

## 2020-11-13 DIAGNOSIS — M25.562 ACUTE PAIN OF LEFT KNEE: Primary | ICD-10-CM

## 2020-11-13 NOTE — TELEPHONE ENCOUNTER
Called and spoke with pt's son Sheldon in regards of pt needing an knee x-ray.  Sheldon states that it is the patient left knee that is giving her trouble. Please place x-ray orders for left knee. Please advise.

## 2020-11-13 NOTE — TELEPHONE ENCOUNTER
Thanks, Ashley. Let me ask one other thing. Since the appointment is so far off, would it be possible to have the x-ray done sooner? My thought is while we don't think it is anything serious, review of an x-ray done earlier could confirm what it is not, and we might be able to take other actions before she is able to see the doctor 20 days from now.  Or if it is something more serious, get her to see the doctor at another locations.  If you are able to schedule an x-ray after 10:30 most days, we could get her in. Thanks, Justin Foster

## 2020-11-16 NOTE — TELEPHONE ENCOUNTER
Called and spoke with pt's son in regards of mother needing a x-ray done. Pt's son made appt for 11/17/2020 for 3:00 pm tomorrow.

## 2020-11-17 ENCOUNTER — PATIENT MESSAGE (OUTPATIENT)
Dept: FAMILY MEDICINE | Facility: CLINIC | Age: 85
End: 2020-11-17

## 2020-11-18 ENCOUNTER — TELEPHONE (OUTPATIENT)
Dept: FAMILY MEDICINE | Facility: CLINIC | Age: 85
End: 2020-11-18

## 2020-11-18 NOTE — TELEPHONE ENCOUNTER
Called and spoke with pt's son Sheldon in regards of pt's knee x-ray. Pt's son verbalized understanding of message , but wants to know what can they do and what can she take to help with the pain. He stated that she has problems putting weight on that knee. Please advise.

## 2020-11-18 NOTE — TELEPHONE ENCOUNTER
----- Message from Michelle Hopson MD sent at 11/18/2020 12:48 PM CST -----  Please let pt know the xray showed arthritis

## 2020-11-19 ENCOUNTER — CLINICAL SUPPORT (OUTPATIENT)
Dept: URGENT CARE | Facility: CLINIC | Age: 85
End: 2020-11-19
Payer: MEDICARE

## 2020-11-19 VITALS — TEMPERATURE: 98 F

## 2020-11-19 DIAGNOSIS — U07.1 COVID-19: Primary | ICD-10-CM

## 2020-11-19 LAB
CTP QC/QA: YES
SARS-COV-2 RDRP RESP QL NAA+PROBE: NEGATIVE

## 2020-11-19 PROCEDURE — U0002 COVID-19 LAB TEST NON-CDC: HCPCS | Mod: QW,S$GLB,, | Performed by: NURSE PRACTITIONER

## 2020-11-19 PROCEDURE — U0002: ICD-10-PCS | Mod: QW,S$GLB,, | Performed by: NURSE PRACTITIONER

## 2020-11-19 NOTE — PATIENT INSTRUCTIONS
CDC Testing and Quarantine Guidelines for Exposure:    A close exposure is defined as anyone who had a masked or an unmasked exposure to a known COVID -19 positive person, at less than 6 ft for more than 15 minutes. If your exposure meets this definition, then you are required to quarantine for 14 days per the CDC. They now recommend that a test can be performed if you are asymptomatic (someone who does not have any symptoms), and a test should be done if you develop symptoms after an exposure as described above.         If you meet the definition of a close exposure, it does not matter whether or not you are asymptomatic or symptomatic - A NEGATIVE TEST DOES NOT GET YOU OUT OF 14 DAYS OF QUARANTINE!         Please note that if you are asymptomatic and wait more than 4 days to test after an exposure, you risk lengthening your quarantine. This is because if you test positive as an asymptomatic, your isolation is 10 days from the date of the positive test, not the date of exposure. So for example, if you test positive as an asymptomatic on day 7 from exposure, you have now extended your 14 day quarantine to a 17 day isolation.         If your exposure does not meet the above definition, you may return to your normal activities including social distancing, wearing masks, and frequent handwashing.

## 2020-11-19 NOTE — TELEPHONE ENCOUNTER
Called and spoke with pt's son Sheldon in regards pf providers message. Pt's son verbalized understanding of message.

## 2020-11-19 NOTE — PROGRESS NOTES

## 2020-11-19 NOTE — TELEPHONE ENCOUNTER
At last visit discussed  ice, tylenol and anti inflammatories as needed, compression, and that if this was not helping I would like her to be seen by an orthopedist, that referral was placed at time xray was ordered     We can try a short course of higher dose rx anti inflammatories which may help temporarily but will not provide long term relief based on xray results.    Did they get covid tested?

## 2020-11-24 ENCOUNTER — PATIENT MESSAGE (OUTPATIENT)
Dept: FAMILY MEDICINE | Facility: CLINIC | Age: 85
End: 2020-11-24

## 2020-11-25 ENCOUNTER — PATIENT MESSAGE (OUTPATIENT)
Dept: OTOLARYNGOLOGY | Facility: CLINIC | Age: 85
End: 2020-11-25

## 2020-11-28 ENCOUNTER — PATIENT MESSAGE (OUTPATIENT)
Dept: OTOLARYNGOLOGY | Facility: CLINIC | Age: 85
End: 2020-11-28

## 2020-11-30 DIAGNOSIS — S09.301A INJURY OF TYMPANIC MEMBRANE OF RIGHT EAR, INITIAL ENCOUNTER: ICD-10-CM

## 2020-11-30 RX ORDER — PREDNISOLONE ACETATE 10 MG/ML
SUSPENSION/ DROPS OPHTHALMIC
Qty: 5 ML | Refills: 0 | OUTPATIENT
Start: 2020-11-30

## 2020-11-30 RX ORDER — PREDNISOLONE ACETATE 10 MG/ML
SUSPENSION/ DROPS OPHTHALMIC
Qty: 5 ML | Refills: 0 | Status: SHIPPED | OUTPATIENT
Start: 2020-11-30 | End: 2021-02-26

## 2020-11-30 RX ORDER — OFLOXACIN 3 MG/ML
SOLUTION/ DROPS OPHTHALMIC
Qty: 5 ML | Refills: 0 | Status: SHIPPED | OUTPATIENT
Start: 2020-11-30 | End: 2021-02-26

## 2020-11-30 RX ORDER — OFLOXACIN 3 MG/ML
SOLUTION/ DROPS OPHTHALMIC
Qty: 5 ML | Refills: 0 | OUTPATIENT
Start: 2020-11-30

## 2020-12-01 ENCOUNTER — OFFICE VISIT (OUTPATIENT)
Dept: ORTHOPEDICS | Facility: CLINIC | Age: 85
End: 2020-12-01
Payer: MEDICARE

## 2020-12-01 VITALS — HEIGHT: 69 IN | WEIGHT: 177.94 LBS | BODY MASS INDEX: 26.35 KG/M2

## 2020-12-01 DIAGNOSIS — G89.29 CHRONIC PAIN OF LEFT KNEE: ICD-10-CM

## 2020-12-01 DIAGNOSIS — M17.12 PRIMARY OSTEOARTHRITIS OF LEFT KNEE: Primary | ICD-10-CM

## 2020-12-01 DIAGNOSIS — M25.562 CHRONIC PAIN OF LEFT KNEE: ICD-10-CM

## 2020-12-01 PROCEDURE — 99999 PR PBB SHADOW E&M-EST. PATIENT-LVL III: CPT | Mod: PBBFAC,,, | Performed by: ORTHOPAEDIC SURGERY

## 2020-12-01 PROCEDURE — 99999 PR PBB SHADOW E&M-EST. PATIENT-LVL III: ICD-10-PCS | Mod: PBBFAC,,, | Performed by: ORTHOPAEDIC SURGERY

## 2020-12-01 PROCEDURE — 20610 PR DRAIN/INJECT LARGE JOINT/BURSA: ICD-10-PCS | Mod: S$PBB,LT,, | Performed by: ORTHOPAEDIC SURGERY

## 2020-12-01 PROCEDURE — 99213 OFFICE O/P EST LOW 20 MIN: CPT | Mod: PBBFAC,PN | Performed by: ORTHOPAEDIC SURGERY

## 2020-12-01 PROCEDURE — 99202 OFFICE O/P NEW SF 15 MIN: CPT | Mod: S$PBB,25,, | Performed by: ORTHOPAEDIC SURGERY

## 2020-12-01 PROCEDURE — 20610 DRAIN/INJ JOINT/BURSA W/O US: CPT | Mod: S$PBB,LT,, | Performed by: ORTHOPAEDIC SURGERY

## 2020-12-01 PROCEDURE — 99202 PR OFFICE/OUTPT VISIT, NEW, LEVL II, 15-29 MIN: ICD-10-PCS | Mod: S$PBB,25,, | Performed by: ORTHOPAEDIC SURGERY

## 2020-12-01 RX ORDER — TRIAMCINOLONE ACETONIDE 40 MG/ML
40 INJECTION, SUSPENSION INTRA-ARTICULAR; INTRAMUSCULAR
Status: DISCONTINUED | OUTPATIENT
Start: 2020-12-01 | End: 2020-12-01 | Stop reason: HOSPADM

## 2020-12-01 RX ADMIN — TRIAMCINOLONE ACETONIDE 40 MG: 40 INJECTION, SUSPENSION INTRA-ARTICULAR; INTRAMUSCULAR at 02:12

## 2020-12-01 NOTE — PROGRESS NOTES
Subjective:      Patient ID: Chloe Foster is a 89 y.o. female.    Chief Complaint: Consult and Knee Pain (left )    HPI     They have experienced problems with their left knee over the past 1 month. The patient denies relevant history of injury/aggravation.  The patient and her son report previous similar episodes, possibly related to exercise.  Pain is located medially Associated symptoms include NA.  Symptoms are aggravated by no particular activity. They have been treated with over-the-counter analgesics.   Symptoms have recently waxed and waned. Ambulation reportedly has not been impaired. Self care ADLs are not painful.     Review of Systems   Constitution: Negative for fever and weight loss.   HENT: Negative for congestion.    Eyes: Negative for visual disturbance.   Cardiovascular: Negative for chest pain.   Respiratory: Negative for shortness of breath.    Hematologic/Lymphatic: Negative for bleeding problem. Does not bruise/bleed easily.   Skin: Negative for poor wound healing.   Musculoskeletal: Positive for joint pain.   Gastrointestinal: Negative for abdominal pain.   Genitourinary: Negative for dysuria.   Neurological: Negative for seizures.   Psychiatric/Behavioral: Negative for altered mental status.   Allergic/Immunologic: Negative for persistent infections.         Objective:      Ortho/SPM Exam        Left knee    The patient is not in acute distress.   Body habitus is normal.   Sclera appear normal  No respiratory distress  The patient walks without a limp.  Resisted SLR negative.   The skin over the knee is intact.  Knee effusion trace  Tendernes is located absent.  Range of motion- Flexion full, Extension full.   Ligament exam:   MCL trace laxity   Lachman intact              Post sag intact    LCL intact  Patellar apprehension negative.  Popliteal cyst negative  Patellar crepitation absent.  Flexion/pinch negative.  Pulses DP present, PT present.  Motor normal 5/5 strength in all tested  muscle groups.   Sensory normal.    I reviewed the relevant imaging for the patient's condition:  Left knee films show mild medial narrowing      Assessment:       Encounter Diagnoses   Name Primary?    Chronic pain of left knee     Primary osteoarthritis of left knee Yes      The osteoarthritis is structurally mild.  Acute symptoms may be related to synovitis and/are some degree of bone marrow edema.      Plan:       Chloe was seen today for consult and knee pain.    Diagnoses and all orders for this visit:    Primary osteoarthritis of left knee    Chronic pain of left knee  -     Ambulatory referral/consult to Orthopedics      I explained my diagnostic impression and the reasoning behind it in detail, using layman's terms.  Models and/or pictures were used to help in the explanation.    Injection recommended and consent give    Icing and modification of exercise recommended.    Periodic palliative injections are reasonable, if clinically needed.

## 2020-12-01 NOTE — PROCEDURES
Large Joint Aspiration/Injection    Date/Time: 12/1/2020 2:00 PM  Performed by: Austin Lopez MD  Authorized by: Austin Lopez MD     Medications:  40 mg triamcinolone acetonide 40 mg/mL     After obtaining verbal informed consent the patient's left knee was prepped aseptically and injected through an inferior lateral approach using 40 mg of triamcinolone and 1 cc of 1% plain Xylocaine.  The patient was warned about postinjection flare and how to manage it with ice, rest and over-the-counter analgesics.  They're advised to contact me for any severe, uncontrolled pain.

## 2020-12-01 NOTE — LETTER
December 1, 2020      Michelle Hopson MD  [address] [facility] [address]           [facility]  [address] , [facility] [address]  [facility] [address]  Phone: [phone]  Fax: [phone]          Patient: Chloe Foster   MR Number: [id number]   YOB: 1929   Date of Visit: 12/1/2020       Dear Dr. Michelle Hopson:    Thank you for referring Chloe Foster to me for evaluation. Attached you will find relevant portions of my assessment and plan of care.    If you have questions, please do not hesitate to call me. I look forward to following Chloe Foster along with you.    Sincerely,    Austin Lopez MD    Enclosure  CC:  No Recipients    If you would like to receive this communication electronically, please contact [email or web address] or [phone] to request more information on [employer] access.    For providers and/or their staff who would like to refer a patient to [facility], please contact us through our one-stop-shop provider referral line, [facility], at [phone].    If you feel you have received this communication in error or would no longer like to receive these types of communications, please e-mail [email or web address]

## 2020-12-18 ENCOUNTER — TELEPHONE (OUTPATIENT)
Dept: OTOLARYNGOLOGY | Facility: CLINIC | Age: 85
End: 2020-12-18

## 2020-12-18 NOTE — TELEPHONE ENCOUNTER
Spoke with patients son he was notified due to Dr Zarco out on maternity leave needing to reschedule appointment on 12/22. Offered to reschedule appt with Dr Calzada. Patients son states that is okay she is doing fine right now ears are okay and due to covid he would like to post pone the appointment for now. Told him when ready to reschedule please call the office. He verbalized understanding and thanked me kindly calling

## 2020-12-28 ENCOUNTER — PATIENT MESSAGE (OUTPATIENT)
Dept: FAMILY MEDICINE | Facility: CLINIC | Age: 85
End: 2020-12-28

## 2020-12-28 RX ORDER — DONEPEZIL HYDROCHLORIDE 5 MG/1
5 TABLET, FILM COATED ORAL DAILY
Qty: 90 TABLET | Refills: 1 | Status: SHIPPED | OUTPATIENT
Start: 2020-12-28 | End: 2021-06-21

## 2021-01-01 ENCOUNTER — PATIENT MESSAGE (OUTPATIENT)
Dept: FAMILY MEDICINE | Facility: CLINIC | Age: 86
End: 2021-01-01

## 2021-01-04 ENCOUNTER — IMMUNIZATION (OUTPATIENT)
Dept: FAMILY MEDICINE | Facility: CLINIC | Age: 86
End: 2021-01-04
Payer: MEDICARE

## 2021-01-04 DIAGNOSIS — Z23 NEED FOR VACCINATION: ICD-10-CM

## 2021-01-04 PROCEDURE — 91300 COVID-19, MRNA, LNP-S, PF, 30 MCG/0.3 ML DOSE VACCINE: CPT | Mod: PBBFAC,PN

## 2021-01-25 ENCOUNTER — IMMUNIZATION (OUTPATIENT)
Dept: FAMILY MEDICINE | Facility: CLINIC | Age: 86
End: 2021-01-25
Payer: MEDICARE

## 2021-01-25 DIAGNOSIS — Z23 NEED FOR VACCINATION: Primary | ICD-10-CM

## 2021-01-25 PROCEDURE — 0002A COVID-19, MRNA, LNP-S, PF, 30 MCG/0.3 ML DOSE VACCINE: CPT | Mod: PBBFAC | Performed by: FAMILY MEDICINE

## 2021-01-25 PROCEDURE — 91300 COVID-19, MRNA, LNP-S, PF, 30 MCG/0.3 ML DOSE VACCINE: CPT | Mod: PBBFAC | Performed by: FAMILY MEDICINE

## 2021-02-21 ENCOUNTER — PATIENT MESSAGE (OUTPATIENT)
Dept: FAMILY MEDICINE | Facility: CLINIC | Age: 86
End: 2021-02-21

## 2021-02-21 DIAGNOSIS — R41.82 ALTERED MENTAL STATUS, UNSPECIFIED ALTERED MENTAL STATUS TYPE: Primary | ICD-10-CM

## 2021-02-22 ENCOUNTER — PATIENT MESSAGE (OUTPATIENT)
Dept: FAMILY MEDICINE | Facility: CLINIC | Age: 86
End: 2021-02-22

## 2021-02-22 DIAGNOSIS — R41.82 ALTERED MENTAL STATUS, UNSPECIFIED ALTERED MENTAL STATUS TYPE: Primary | ICD-10-CM

## 2021-02-25 ENCOUNTER — PATIENT MESSAGE (OUTPATIENT)
Dept: FAMILY MEDICINE | Facility: CLINIC | Age: 86
End: 2021-02-25

## 2021-02-26 ENCOUNTER — OFFICE VISIT (OUTPATIENT)
Dept: ORTHOPEDICS | Facility: CLINIC | Age: 86
End: 2021-02-26
Payer: MEDICARE

## 2021-02-26 VITALS
SYSTOLIC BLOOD PRESSURE: 126 MMHG | BODY MASS INDEX: 26.14 KG/M2 | RESPIRATION RATE: 20 BRPM | DIASTOLIC BLOOD PRESSURE: 76 MMHG | WEIGHT: 177 LBS | HEART RATE: 72 BPM

## 2021-02-26 DIAGNOSIS — M17.12 PRIMARY OSTEOARTHRITIS OF LEFT KNEE: Primary | ICD-10-CM

## 2021-02-26 PROCEDURE — 99999 PR PBB SHADOW E&M-EST. PATIENT-LVL III: CPT | Mod: PBBFAC,,, | Performed by: PHYSICIAN ASSISTANT

## 2021-02-26 PROCEDURE — 99213 PR OFFICE/OUTPT VISIT, EST, LEVL III, 20-29 MIN: ICD-10-PCS | Mod: S$PBB,,, | Performed by: PHYSICIAN ASSISTANT

## 2021-02-26 PROCEDURE — 99213 OFFICE O/P EST LOW 20 MIN: CPT | Mod: S$PBB,,, | Performed by: PHYSICIAN ASSISTANT

## 2021-02-26 PROCEDURE — 99213 OFFICE O/P EST LOW 20 MIN: CPT | Mod: PBBFAC,PN | Performed by: PHYSICIAN ASSISTANT

## 2021-02-26 PROCEDURE — 99999 PR PBB SHADOW E&M-EST. PATIENT-LVL III: ICD-10-PCS | Mod: PBBFAC,,, | Performed by: PHYSICIAN ASSISTANT

## 2021-03-03 ENCOUNTER — PATIENT MESSAGE (OUTPATIENT)
Dept: FAMILY MEDICINE | Facility: CLINIC | Age: 86
End: 2021-03-03

## 2021-03-30 ENCOUNTER — OFFICE VISIT (OUTPATIENT)
Dept: OTOLARYNGOLOGY | Facility: CLINIC | Age: 86
End: 2021-03-30
Payer: MEDICARE

## 2021-03-30 ENCOUNTER — CLINICAL SUPPORT (OUTPATIENT)
Dept: AUDIOLOGY | Facility: CLINIC | Age: 86
End: 2021-03-30
Payer: MEDICARE

## 2021-03-30 VITALS — WEIGHT: 179.25 LBS | BODY MASS INDEX: 26.47 KG/M2

## 2021-03-30 DIAGNOSIS — H90.A31 MIXED CONDUCTIVE AND SENSORINEURAL HEARING LOSS, UNILATERAL, RIGHT EAR WITH RESTRICTED HEARING ON THE CONTRALATERAL SIDE: Primary | ICD-10-CM

## 2021-03-30 DIAGNOSIS — S09.21XS: ICD-10-CM

## 2021-03-30 DIAGNOSIS — H90.A31 MIXED CONDUCTIVE AND SENSORINEURAL HEARING LOSS OF RIGHT EAR WITH RESTRICTED HEARING OF LEFT EAR: Primary | ICD-10-CM

## 2021-03-30 DIAGNOSIS — R21 RASH: ICD-10-CM

## 2021-03-30 DIAGNOSIS — H92.11 OTORRHEA OF RIGHT EAR: ICD-10-CM

## 2021-03-30 DIAGNOSIS — Z97.4 WEARS HEARING AID IN BOTH EARS: ICD-10-CM

## 2021-03-30 PROCEDURE — 99999 PR PBB SHADOW E&M-EST. PATIENT-LVL III: ICD-10-PCS | Mod: PBBFAC,,, | Performed by: OTOLARYNGOLOGY

## 2021-03-30 PROCEDURE — 69210 PR REMOVAL IMPACTED CERUMEN REQUIRING INSTRUMENTATION, UNILATERAL: ICD-10-PCS | Mod: S$PBB,,, | Performed by: OTOLARYNGOLOGY

## 2021-03-30 PROCEDURE — 99213 OFFICE O/P EST LOW 20 MIN: CPT | Mod: PBBFAC,25 | Performed by: OTOLARYNGOLOGY

## 2021-03-30 PROCEDURE — 92567 TYMPANOMETRY: CPT | Mod: PBBFAC | Performed by: AUDIOLOGIST

## 2021-03-30 PROCEDURE — 99214 PR OFFICE/OUTPT VISIT, EST, LEVL IV, 30-39 MIN: ICD-10-PCS | Mod: 25,S$PBB,, | Performed by: OTOLARYNGOLOGY

## 2021-03-30 PROCEDURE — 99214 OFFICE O/P EST MOD 30 MIN: CPT | Mod: 25,S$PBB,, | Performed by: OTOLARYNGOLOGY

## 2021-03-30 PROCEDURE — 92557 COMPREHENSIVE HEARING TEST: CPT | Mod: PBBFAC | Performed by: AUDIOLOGIST

## 2021-03-30 PROCEDURE — 69210 REMOVE IMPACTED EAR WAX UNI: CPT | Mod: S$PBB,,, | Performed by: OTOLARYNGOLOGY

## 2021-03-30 PROCEDURE — 99999 PR PBB SHADOW E&M-EST. PATIENT-LVL III: CPT | Mod: PBBFAC,,, | Performed by: OTOLARYNGOLOGY

## 2021-03-30 PROCEDURE — 69210 REMOVE IMPACTED EAR WAX UNI: CPT | Mod: 50,PBBFAC | Performed by: OTOLARYNGOLOGY

## 2021-03-30 RX ORDER — OFLOXACIN 3 MG/ML
SOLUTION/ DROPS OPHTHALMIC
Qty: 5 ML | Refills: 1 | Status: SHIPPED | OUTPATIENT
Start: 2021-03-30 | End: 2022-02-10 | Stop reason: SDUPTHER

## 2021-03-30 RX ORDER — TRIAMCINOLONE ACETONIDE 1 MG/G
OINTMENT TOPICAL 2 TIMES DAILY
Qty: 30 G | Refills: 2 | Status: SHIPPED | OUTPATIENT
Start: 2021-03-30 | End: 2023-10-10

## 2021-04-08 ENCOUNTER — PATIENT MESSAGE (OUTPATIENT)
Dept: FAMILY MEDICINE | Facility: CLINIC | Age: 86
End: 2021-04-08

## 2021-04-15 ENCOUNTER — OFFICE VISIT (OUTPATIENT)
Dept: FAMILY MEDICINE | Facility: CLINIC | Age: 86
End: 2021-04-15
Payer: MEDICARE

## 2021-04-15 VITALS
OXYGEN SATURATION: 97 % | SYSTOLIC BLOOD PRESSURE: 118 MMHG | TEMPERATURE: 98 F | HEART RATE: 100 BPM | BODY MASS INDEX: 26.85 KG/M2 | DIASTOLIC BLOOD PRESSURE: 58 MMHG | WEIGHT: 181.31 LBS | HEIGHT: 69 IN

## 2021-04-15 DIAGNOSIS — F03.90 DEMENTIA WITHOUT BEHAVIORAL DISTURBANCE, UNSPECIFIED DEMENTIA TYPE: Primary | ICD-10-CM

## 2021-04-15 DIAGNOSIS — F34.1 DYSTHYMIA: ICD-10-CM

## 2021-04-15 DIAGNOSIS — E78.00 HYPERCHOLESTEROLEMIA: ICD-10-CM

## 2021-04-15 DIAGNOSIS — E89.0 POSTABLATIVE HYPOTHYROIDISM: ICD-10-CM

## 2021-04-15 DIAGNOSIS — R53.83 FATIGUE, UNSPECIFIED TYPE: ICD-10-CM

## 2021-04-15 PROCEDURE — 99214 PR OFFICE/OUTPT VISIT, EST, LEVL IV, 30-39 MIN: ICD-10-PCS | Mod: S$PBB,,, | Performed by: INTERNAL MEDICINE

## 2021-04-15 PROCEDURE — 99214 OFFICE O/P EST MOD 30 MIN: CPT | Mod: S$PBB,,, | Performed by: INTERNAL MEDICINE

## 2021-04-15 PROCEDURE — 99213 OFFICE O/P EST LOW 20 MIN: CPT | Mod: PBBFAC,PN | Performed by: INTERNAL MEDICINE

## 2021-04-15 PROCEDURE — 99999 PR PBB SHADOW E&M-EST. PATIENT-LVL III: CPT | Mod: PBBFAC,,, | Performed by: INTERNAL MEDICINE

## 2021-04-15 PROCEDURE — 99999 PR PBB SHADOW E&M-EST. PATIENT-LVL III: ICD-10-PCS | Mod: PBBFAC,,, | Performed by: INTERNAL MEDICINE

## 2021-04-15 RX ORDER — SERTRALINE HYDROCHLORIDE 100 MG/1
100 TABLET, FILM COATED ORAL DAILY
Qty: 90 TABLET | Refills: 1 | Status: SHIPPED | OUTPATIENT
Start: 2021-04-15 | End: 2021-10-14 | Stop reason: SDUPTHER

## 2021-05-05 ENCOUNTER — PATIENT MESSAGE (OUTPATIENT)
Dept: FAMILY MEDICINE | Facility: CLINIC | Age: 86
End: 2021-05-05

## 2021-05-05 DIAGNOSIS — R00.2 PALPITATIONS: Primary | ICD-10-CM

## 2021-05-14 ENCOUNTER — PES CALL (OUTPATIENT)
Dept: ADMINISTRATIVE | Facility: CLINIC | Age: 86
End: 2021-05-14

## 2021-08-09 ENCOUNTER — PATIENT MESSAGE (OUTPATIENT)
Dept: OTOLARYNGOLOGY | Facility: CLINIC | Age: 86
End: 2021-08-09

## 2021-09-28 ENCOUNTER — PATIENT MESSAGE (OUTPATIENT)
Dept: FAMILY MEDICINE | Facility: CLINIC | Age: 86
End: 2021-09-28

## 2021-09-28 ENCOUNTER — PATIENT MESSAGE (OUTPATIENT)
Dept: ENDOCRINOLOGY | Facility: CLINIC | Age: 86
End: 2021-09-28

## 2021-10-08 ENCOUNTER — PATIENT MESSAGE (OUTPATIENT)
Dept: FAMILY MEDICINE | Facility: CLINIC | Age: 86
End: 2021-10-08

## 2021-10-13 ENCOUNTER — PATIENT MESSAGE (OUTPATIENT)
Dept: ENDOCRINOLOGY | Facility: CLINIC | Age: 86
End: 2021-10-13

## 2021-10-13 DIAGNOSIS — E89.0 POSTABLATIVE HYPOTHYROIDISM: Primary | ICD-10-CM

## 2021-10-14 ENCOUNTER — OFFICE VISIT (OUTPATIENT)
Dept: FAMILY MEDICINE | Facility: CLINIC | Age: 86
End: 2021-10-14
Payer: MEDICARE

## 2021-10-14 VITALS
WEIGHT: 176.25 LBS | HEART RATE: 85 BPM | HEIGHT: 69 IN | DIASTOLIC BLOOD PRESSURE: 68 MMHG | OXYGEN SATURATION: 95 % | BODY MASS INDEX: 26.11 KG/M2 | SYSTOLIC BLOOD PRESSURE: 116 MMHG | TEMPERATURE: 98 F

## 2021-10-14 DIAGNOSIS — E89.0 POSTABLATIVE HYPOTHYROIDISM: ICD-10-CM

## 2021-10-14 DIAGNOSIS — R53.83 FATIGUE, UNSPECIFIED TYPE: ICD-10-CM

## 2021-10-14 DIAGNOSIS — R20.2 PARESTHESIA OF BOTH HANDS: ICD-10-CM

## 2021-10-14 DIAGNOSIS — F03.90 DEMENTIA WITHOUT BEHAVIORAL DISTURBANCE, UNSPECIFIED DEMENTIA TYPE: ICD-10-CM

## 2021-10-14 DIAGNOSIS — F34.1 DYSTHYMIA: ICD-10-CM

## 2021-10-14 PROCEDURE — 99999 PR PBB SHADOW E&M-EST. PATIENT-LVL III: ICD-10-PCS | Mod: PBBFAC,,, | Performed by: INTERNAL MEDICINE

## 2021-10-14 PROCEDURE — 99214 PR OFFICE/OUTPT VISIT, EST, LEVL IV, 30-39 MIN: ICD-10-PCS | Mod: S$PBB,,, | Performed by: INTERNAL MEDICINE

## 2021-10-14 PROCEDURE — 99213 OFFICE O/P EST LOW 20 MIN: CPT | Mod: PBBFAC,PN | Performed by: INTERNAL MEDICINE

## 2021-10-14 PROCEDURE — 99999 PR PBB SHADOW E&M-EST. PATIENT-LVL III: CPT | Mod: PBBFAC,,, | Performed by: INTERNAL MEDICINE

## 2021-10-14 PROCEDURE — 99214 OFFICE O/P EST MOD 30 MIN: CPT | Mod: S$PBB,,, | Performed by: INTERNAL MEDICINE

## 2021-10-14 RX ORDER — SERTRALINE HYDROCHLORIDE 100 MG/1
100 TABLET, FILM COATED ORAL DAILY
Qty: 90 TABLET | Refills: 1 | Status: SHIPPED | OUTPATIENT
Start: 2021-10-14 | End: 2022-08-05

## 2021-10-17 PROBLEM — R20.2 PARESTHESIA OF BOTH HANDS: Status: ACTIVE | Noted: 2021-10-17

## 2021-10-19 ENCOUNTER — IMMUNIZATION (OUTPATIENT)
Dept: FAMILY MEDICINE | Facility: CLINIC | Age: 86
End: 2021-10-19
Payer: MEDICARE

## 2021-10-19 DIAGNOSIS — Z23 NEED FOR VACCINATION: Primary | ICD-10-CM

## 2021-10-19 PROCEDURE — 91300 COVID-19, MRNA, LNP-S, PF, 30 MCG/0.3 ML DOSE VACCINE: CPT | Mod: PBBFAC,PN

## 2021-10-19 PROCEDURE — 0003A COVID-19, MRNA, LNP-S, PF, 30 MCG/0.3 ML DOSE VACCINE: CPT | Mod: PBBFAC,PN

## 2021-10-21 ENCOUNTER — PATIENT MESSAGE (OUTPATIENT)
Dept: ENDOCRINOLOGY | Facility: CLINIC | Age: 86
End: 2021-10-21

## 2021-10-21 ENCOUNTER — OFFICE VISIT (OUTPATIENT)
Dept: ENDOCRINOLOGY | Facility: CLINIC | Age: 86
End: 2021-10-21
Payer: MEDICARE

## 2021-10-21 DIAGNOSIS — E89.0 POSTABLATIVE HYPOTHYROIDISM: Primary | ICD-10-CM

## 2021-10-21 DIAGNOSIS — E04.2 NONTOXIC MULTINODULAR GOITER: ICD-10-CM

## 2021-10-21 PROCEDURE — 99213 PR OFFICE/OUTPT VISIT, EST, LEVL III, 20-29 MIN: ICD-10-PCS | Mod: 95,,, | Performed by: INTERNAL MEDICINE

## 2021-10-21 PROCEDURE — 99213 OFFICE O/P EST LOW 20 MIN: CPT | Mod: 95,,, | Performed by: INTERNAL MEDICINE

## 2021-10-21 RX ORDER — LEVOTHYROXINE SODIUM 75 UG/1
75 TABLET ORAL
Qty: 90 TABLET | Refills: 3 | Status: SHIPPED | OUTPATIENT
Start: 2021-10-21 | End: 2022-10-10 | Stop reason: SDUPTHER

## 2021-10-25 ENCOUNTER — PATIENT MESSAGE (OUTPATIENT)
Dept: FAMILY MEDICINE | Facility: CLINIC | Age: 86
End: 2021-10-25
Payer: MEDICARE

## 2021-10-25 RX ORDER — DONEPEZIL HYDROCHLORIDE 5 MG/1
5 TABLET, FILM COATED ORAL DAILY
Qty: 90 TABLET | Refills: 1 | Status: SHIPPED | OUTPATIENT
Start: 2021-10-25 | End: 2022-05-08

## 2022-02-07 ENCOUNTER — PATIENT MESSAGE (OUTPATIENT)
Dept: INTERNAL MEDICINE | Facility: CLINIC | Age: 87
End: 2022-02-07
Payer: MEDICARE

## 2022-02-07 DIAGNOSIS — E78.00 HYPERCHOLESTEROLEMIA: ICD-10-CM

## 2022-02-08 NOTE — TELEPHONE ENCOUNTER
Care Due:                  Date            Visit Type   Department     Provider  --------------------------------------------------------------------------------                                ESTABLISHED                              PATIENT      DESC FAMILY  Last Visit: 10-      [facility]  Michelle Hopson                              ESTABLISHED                              PATIENT      [facility] PRIMARY  Next Visit: 04-      [facility]        CARE           Michellealvin Hopson                                                            Last  Test          Frequency    Reason                     Performed    Due Date  --------------------------------------------------------------------------------    Lipid Panel.  12 months..  pravastatin..............  Not Found    Overdue    Powered by [employer]. Reference number: [id number].   2/08/2022 8:04:44 AM CST

## 2022-02-09 RX ORDER — PRAVASTATIN SODIUM 40 MG/1
40 TABLET ORAL DAILY
Qty: 90 TABLET | Refills: 1 | Status: SHIPPED | OUTPATIENT
Start: 2022-02-09 | End: 2022-02-14 | Stop reason: SDUPTHER

## 2022-02-10 ENCOUNTER — OFFICE VISIT (OUTPATIENT)
Dept: OTOLARYNGOLOGY | Facility: CLINIC | Age: 87
End: 2022-02-10
Payer: MEDICARE

## 2022-02-10 VITALS
WEIGHT: 172.19 LBS | HEIGHT: 69 IN | HEART RATE: 85 BPM | BODY MASS INDEX: 25.5 KG/M2 | DIASTOLIC BLOOD PRESSURE: 63 MMHG | SYSTOLIC BLOOD PRESSURE: 111 MMHG

## 2022-02-10 DIAGNOSIS — H90.A31 MIXED CONDUCTIVE AND SENSORINEURAL HEARING LOSS, UNILATERAL, RIGHT EAR WITH RESTRICTED HEARING ON THE CONTRALATERAL SIDE: ICD-10-CM

## 2022-02-10 DIAGNOSIS — H60.63 CHRONIC OTITIS EXTERNA OF BOTH EARS, UNSPECIFIED TYPE: Chronic | ICD-10-CM

## 2022-02-10 DIAGNOSIS — H92.11 OTORRHEA OF RIGHT EAR: Primary | ICD-10-CM

## 2022-02-10 DIAGNOSIS — S09.21XS: Chronic | ICD-10-CM

## 2022-02-10 PROCEDURE — 69210 REMOVE IMPACTED EAR WAX UNI: CPT | Mod: PBBFAC,PN | Performed by: OTOLARYNGOLOGY

## 2022-02-10 PROCEDURE — 99999 PR PBB SHADOW E&M-EST. PATIENT-LVL III: ICD-10-PCS | Mod: PBBFAC,,, | Performed by: OTOLARYNGOLOGY

## 2022-02-10 PROCEDURE — 99214 PR OFFICE/OUTPT VISIT, EST, LEVL IV, 30-39 MIN: ICD-10-PCS | Mod: 25,S$PBB,, | Performed by: OTOLARYNGOLOGY

## 2022-02-10 PROCEDURE — 99214 OFFICE O/P EST MOD 30 MIN: CPT | Mod: 25,S$PBB,, | Performed by: OTOLARYNGOLOGY

## 2022-02-10 PROCEDURE — 69210 EAR CERUMEN REMOVAL: ICD-10-PCS | Mod: S$PBB,,, | Performed by: OTOLARYNGOLOGY

## 2022-02-10 PROCEDURE — 99999 PR PBB SHADOW E&M-EST. PATIENT-LVL III: CPT | Mod: PBBFAC,,, | Performed by: OTOLARYNGOLOGY

## 2022-02-10 PROCEDURE — 99213 OFFICE O/P EST LOW 20 MIN: CPT | Mod: PBBFAC,PN,25 | Performed by: OTOLARYNGOLOGY

## 2022-02-10 RX ORDER — FLUOCINOLONE ACETONIDE 0.11 MG/ML
3 OIL AURICULAR (OTIC) 2 TIMES DAILY
Qty: 20 ML | Refills: 5 | Status: SHIPPED | OUTPATIENT
Start: 2022-02-10

## 2022-02-10 RX ORDER — PREDNISOLONE ACETATE 10 MG/ML
SUSPENSION/ DROPS OPHTHALMIC
Qty: 10 ML | Refills: 0 | Status: SHIPPED | OUTPATIENT
Start: 2022-02-10 | End: 2022-02-10

## 2022-02-10 RX ORDER — OFLOXACIN 3 MG/ML
SOLUTION/ DROPS OPHTHALMIC
Qty: 5 ML | Refills: 1 | Status: SHIPPED | OUTPATIENT
Start: 2022-02-10 | End: 2023-04-10

## 2022-02-10 RX ORDER — OFLOXACIN 3 MG/ML
SOLUTION/ DROPS OPHTHALMIC
Qty: 5 ML | Refills: 1 | Status: SHIPPED | OUTPATIENT
Start: 2022-02-10 | End: 2022-02-10

## 2022-02-10 RX ORDER — PREDNISOLONE ACETATE 10 MG/ML
SUSPENSION/ DROPS OPHTHALMIC
Qty: 10 ML | Refills: 1 | Status: SHIPPED | OUTPATIENT
Start: 2022-02-10 | End: 2022-05-09

## 2022-02-10 NOTE — PATIENT INSTRUCTIONS
Keep right ear dry.      Use prednisolone and ofloxacin drops in right ear twice a day.      Fluocinolone oil drops prescribed to be used in left ear as needed for itching.  (Must wait a few hours after use to place hearing aids in ears).     Will schedule follow up and audiogram at next visit.

## 2022-02-10 NOTE — PROGRESS NOTES
Chief Complaint   Patient presents with    Ear Drainage     Right ear draining for 3 days over last week     HPI: Chloe Foster is 89 y.o. female who was previously seen by Dr. Alanis for cerumen impaction, central TM perforation from Qtip trauma, mixed hearing loss.  Now presents with right otorrhea for the past 3 days.  Slight pain in right ear. Had prednisolone and ofloxacin from previous treatments, so started those 3 days ago when otorrhea began.   Left ear has occasional itching, but no current issues.    Wears hearing aids.       Past Medical History:   Diagnosis Date    Anxiety     Black eye 5/6/2013    Dementia without behavioral disturbance 4/15/2021    Dyslipidemia     Hyperthyroidism     IGT (impaired glucose tolerance)     Mitral valve prolapse     Nontoxic multinodular goiter 2/18/2013    Primary hypothyroidism 5/18/2015    Shingles     left side of chest and back    Thyroid nodule 2/18/2013    Urinary tract infection      Social History     Socioeconomic History    Marital status:    Occupational History    Occupation: Retired   Tobacco Use    Smoking status: Never Smoker    Smokeless tobacco: Never Used   Substance and Sexual Activity    Alcohol use: No     Alcohol/week: 0.0 standard drinks    Drug use: No    Sexual activity: Never   Social History Narrative    Her  passed away in March 2014. She lives with her  and has sitters and a son who helps. She has four children two of whom are twins. Her son is running the family business. Her children are all educated. She worked doing  for the [employer] [place]. She has an education degree and is a retired . She started a travel agency for 15 years. She is a non-smoker and denies alcohol or substance abuse.                      Social Determinants of Health     Financial Resource Strain: Low Risk     Difficulty of Paying Living Expenses: Not hard at all   Food Insecurity: No Food  Insecurity    Worried About Running Out of Food in the Last Year: Never true    Ran Out of Food in the Last Year: Never true   Transportation Needs: Unmet Transportation Needs    Lack of Transportation (Medical): Yes    Lack of Transportation (Non-Medical): No   Physical Activity: Inactive    Days of Exercise per Week: 0 days    Minutes of Exercise per Session: 0 min   Stress: No Stress Concern Present    Feeling of Stress : Not at all   Social Connections: Unknown    Frequency of Communication with Friends and Family: Never    Frequency of Social Gatherings with Friends and Family: Never    Active Member of Clubs or Organizations: No    Attends Club or Organization Meetings: Never    Marital Status:    Housing Stability: Low Risk     Unable to Pay for Housing in the Last Year: No    Number of Places Lived in the Last Year: 1    Unstable Housing in the Last Year: No     Past Surgical History:   Procedure Laterality Date    CATARACT EXTRACTION BILATERAL W/ ANTERIOR VITRECTOMY      DILATION AND CURETTAGE OF UTERUS      x2     Family History   Problem Relation Age of Onset    Thyroid disease Father     Thyroid disease Son     Thyroid disease Daughter     Diabetes Neg Hx     Breast cancer Neg Hx     Colon cancer Neg Hx     Ovarian cancer Neg Hx            Review of Systems  General: negative for chills, fever or weight loss  Psychological: negative for mood changes or depression  Ophthalmic: negative for blurry vision, photophobia or eye pain  ENT: see HPI  Respiratory: no cough, shortness of breath, or wheezing  Cardiovascular: no chest pain or dyspnea on exertion  Gastrointestinal: no abdominal pain, change in bowel habits, or black/ bloody stools  Musculoskeletal: negative for gait disturbance or muscular weakness  Neurological: no syncope or seizures; no ataxia  Dermatological: negative for pruritis,  rash and jaundice  Hematologic/lymphatic: no easy bruising, no new  adenopathy      Physical Exam:    Vitals:    02/10/22 1358   BP: 111/63   Pulse: 85       Constitutional: Well appearing / communicating without difficutly.  NAD.  Eyes: EOM I Bilaterally  Head/Face: Normocephalic.  Negative paranasal sinus pressure/tenderness.  Salivary glands WNL.  House Brackmann I Bilaterally.    Right Ear: Auricle normal appearance. External Auditory Canal within normal limits no lesions or masses,TM w/o masses/lesions/perforations. TM mobility noted.   Left Ear: Auricle normal appearance. External Auditory Canal within normal limits no lesions or masses,TM w/o masses/lesions/perforations. TM mobility noted.  Nose: No gross nasal septal deviation. Inferior Turbinates 3+ bilaterally. No septal perforation. No masses/lesions. External nasal skin appears normal without masses/lesions.  Oral Cavity: Gingiva/lips within normal limits.  Dentition/gingiva healthy appearing. Mucus membranes moist. Floor of mouth soft, no masses palpated. Oral Tongue mobile. Hard Palate appears normal.    Oropharynx: Base of tongue appears normal. No masses/lesions noted. Tonsillar fossa/pharyngeal wall without lesions. Posterior oropharynx WNL.  Soft palate without masses. Midline uvula.   Neck/Lymphatic: No LAD I-VI bilaterally.  No thyromegaly.  No masses noted on exam.    See separate note for cerumen removal and binocular microscopic ear exam.      Assessment:    ICD-10-CM ICD-9-CM    1. Otorrhea of right ear  H92.11 388.60 ofloxacin (OCUFLOX) 0.3 % ophthalmic solution      prednisoLONE acetate (PRED FORTE) 1 % DrpS      DISCONTINUED: prednisoLONE acetate (PRED FORTE) 1 % DrpS      DISCONTINUED: ofloxacin (OCUFLOX) 0.3 % ophthalmic solution   2. Perforation of tympanic membrane, traumatic, right, sequela  S09.21XS 906.0 ofloxacin (OCUFLOX) 0.3 % ophthalmic solution     E989 prednisoLONE acetate (PRED FORTE) 1 % DrpS      DISCONTINUED: prednisoLONE acetate (PRED FORTE) 1 % DrpS      DISCONTINUED: ofloxacin (OCUFLOX)  0.3 % ophthalmic solution   3. Chronic otitis externa of both ears, unspecified type  H60.63 380.23    4. Mixed conductive and sensorineural hearing loss, unilateral, right ear with restricted hearing on the contralateral side  H90.A31 389.22      The primary encounter diagnosis was Otorrhea of right ear. Diagnoses of Perforation of tympanic membrane, traumatic, right, sequela, Chronic otitis externa of both ears, unspecified type, and Mixed conductive and sensorineural hearing loss, unilateral, right ear with restricted hearing on the contralateral side were also pertinent to this visit.      Plan:  No orders of the defined types were placed in this encounter.    Prednisolone and ofloxacin drops used in right ear for next 2 weeks.    Fluocinolone oil drops ordered for MYKEL On left.      Follow up in 6-8 weeks, annual audio at that time.        Carly Onofre MD

## 2022-02-11 ENCOUNTER — TELEPHONE (OUTPATIENT)
Dept: OTOLARYNGOLOGY | Facility: CLINIC | Age: 87
End: 2022-02-11
Payer: MEDICARE

## 2022-02-11 NOTE — TELEPHONE ENCOUNTER
Returned call. Informed that Dr. Onofre corrected the prescriptions and Sammie was able to confirm the change.     ----- Message from Aspen Rodriguez sent at 2/10/2022  4:05 PM CST -----  Needs advice from nurse:      Who Called:[facility]  Regarding:states Ofloxacin and Predforte are not filled at their pharmacy  Would the patient rather a call back or VIA [facility]?  Best Call Back number:   Additional Info:

## 2022-02-11 NOTE — PROCEDURES
Ear Cerumen Removal    Date/Time: 2/10/2022 1:40 PM  Performed by: Carly Onofre MD  Authorized by: Carly Onofre MD     Consent Done?:  Yes (Verbal)  Location details:  Left ear  Procedure type: curette    Cerumen  Removal Results:  Cerumen completely removed  Patient tolerance:  Patient tolerated the procedure well with no immediate complications     Left ear with dry EAC.      Binocular mocroscopic ear exam on right.  Central peforation well healed.  Scan otorrhea, middle ear mucosa mildly inflamed.  No active drainage during exam.            Ears:    Ears:    Carly Onofre MD  [facility]

## 2022-02-14 ENCOUNTER — PATIENT MESSAGE (OUTPATIENT)
Dept: INTERNAL MEDICINE | Facility: CLINIC | Age: 87
End: 2022-02-14
Payer: MEDICARE

## 2022-02-14 DIAGNOSIS — E78.00 HYPERCHOLESTEROLEMIA: ICD-10-CM

## 2022-02-14 RX ORDER — PRAVASTATIN SODIUM 40 MG/1
40 TABLET ORAL DAILY
Qty: 90 TABLET | Refills: 1 | Status: SHIPPED | OUTPATIENT
Start: 2022-02-14 | End: 2022-08-05

## 2022-02-14 NOTE — TELEPHONE ENCOUNTER
Care Due:                  Date            Visit Type   Department     Provider  --------------------------------------------------------------------------------                                ESTABLISHED                              PATIENT      [facility] FAMILY  Last Visit: 10-      [facility]  Michelle Hopson                              ESTABLISHED                              PATIENT      [facility] PRIMARY  Next Visit: 04-      [facility]        CARE           Michellealvin Hopson                                                            Last  Test          Frequency    Reason                     Performed    Due Date  --------------------------------------------------------------------------------    Lipid Panel.  12 months..  pravastatin..............  04- 04-    Powered by [email or web address] by [email or web address]. Reference number: [id number].   2/14/2022 11:38:17 AM CST

## 2022-04-11 ENCOUNTER — PATIENT MESSAGE (OUTPATIENT)
Dept: INTERNAL MEDICINE | Facility: CLINIC | Age: 87
End: 2022-04-11
Payer: MEDICARE

## 2022-04-12 ENCOUNTER — PATIENT MESSAGE (OUTPATIENT)
Dept: INTERNAL MEDICINE | Facility: CLINIC | Age: 87
End: 2022-04-12
Payer: MEDICARE

## 2022-04-12 NOTE — TELEPHONE ENCOUNTER
Canceled Thursday made for 10/20, do you want her to get labs in Oct or now? Please place orders as well.

## 2022-05-03 ENCOUNTER — IMMUNIZATION (OUTPATIENT)
Dept: FAMILY MEDICINE | Facility: CLINIC | Age: 87
End: 2022-05-03
Payer: MEDICARE

## 2022-05-03 DIAGNOSIS — Z23 NEED FOR VACCINATION: Primary | ICD-10-CM

## 2022-05-03 PROCEDURE — 91305 COVID-19, MRNA, LNP-S, PF, 30 MCG/0.3 ML DOSE VACCINE (PFIZER): CPT | Mod: PBBFAC,PN

## 2022-05-06 ENCOUNTER — PATIENT OUTREACH (OUTPATIENT)
Dept: ADMINISTRATIVE | Facility: OTHER | Age: 87
End: 2022-05-06
Payer: MEDICARE

## 2022-05-06 NOTE — PROGRESS NOTES
Health Maintenance Due   Topic Date Due    Shingles Vaccine (3 of 3) 12/19/2019    Lipid Panel  04/09/2022     Updates were requested from care everywhere.  Chart was reviewed for overdue Proactive [facility] En[facility]ters ([facility]) topics (CRS, Breast Cancer Screening, Eye exam)  Health Maintenance has been updated.  LINKS immunization registry triggered.  Immunizations were reconciled.

## 2022-05-08 RX ORDER — DONEPEZIL HYDROCHLORIDE 5 MG/1
TABLET, FILM COATED ORAL
Qty: 90 TABLET | Refills: 1 | Status: SHIPPED | OUTPATIENT
Start: 2022-05-08 | End: 2022-05-09 | Stop reason: SDUPTHER

## 2022-05-09 ENCOUNTER — PATIENT MESSAGE (OUTPATIENT)
Dept: INTERNAL MEDICINE | Facility: CLINIC | Age: 87
End: 2022-05-09
Payer: MEDICARE

## 2022-05-09 ENCOUNTER — CLINICAL SUPPORT (OUTPATIENT)
Dept: OTOLARYNGOLOGY | Facility: CLINIC | Age: 87
End: 2022-05-09
Payer: MEDICARE

## 2022-05-09 ENCOUNTER — OFFICE VISIT (OUTPATIENT)
Dept: OTOLARYNGOLOGY | Facility: CLINIC | Age: 87
End: 2022-05-09
Payer: MEDICARE

## 2022-05-09 VITALS — HEIGHT: 69 IN | WEIGHT: 171.5 LBS | BODY MASS INDEX: 25.4 KG/M2

## 2022-05-09 DIAGNOSIS — H92.11 OTORRHEA OF RIGHT EAR: Primary | ICD-10-CM

## 2022-05-09 DIAGNOSIS — H90.A31 MIXED CONDUCTIVE AND SENSORINEURAL HEARING LOSS, UNILATERAL, RIGHT EAR WITH RESTRICTED HEARING ON THE CONTRALATERAL SIDE: ICD-10-CM

## 2022-05-09 DIAGNOSIS — H90.A31 MIXED CONDUCTIVE AND SENSORINEURAL HEARING LOSS OF RIGHT EAR WITH RESTRICTED HEARING OF LEFT EAR: Primary | ICD-10-CM

## 2022-05-09 DIAGNOSIS — S09.21XS: ICD-10-CM

## 2022-05-09 DIAGNOSIS — H61.21 IMPACTED CERUMEN OF RIGHT EAR: ICD-10-CM

## 2022-05-09 PROCEDURE — 99999 PR PBB SHADOW E&M-EST. PATIENT-LVL III: CPT | Mod: PBBFAC,,, | Performed by: OTOLARYNGOLOGY

## 2022-05-09 PROCEDURE — 92557 COMPREHENSIVE HEARING TEST: CPT | Mod: PBBFAC,PN | Performed by: PHYSICIAN ASSISTANT

## 2022-05-09 PROCEDURE — 99999 PR PBB SHADOW E&M-EST. PATIENT-LVL I: CPT | Mod: PBBFAC,,, | Performed by: PHYSICIAN ASSISTANT

## 2022-05-09 PROCEDURE — 99214 PR OFFICE/OUTPT VISIT, EST, LEVL IV, 30-39 MIN: ICD-10-PCS | Mod: 25,S$PBB,, | Performed by: OTOLARYNGOLOGY

## 2022-05-09 PROCEDURE — 99214 OFFICE O/P EST MOD 30 MIN: CPT | Mod: 25,S$PBB,, | Performed by: OTOLARYNGOLOGY

## 2022-05-09 PROCEDURE — 69210 REMOVE IMPACTED EAR WAX UNI: CPT | Mod: PBBFAC,PN | Performed by: OTOLARYNGOLOGY

## 2022-05-09 PROCEDURE — 92567 TYMPANOMETRY: CPT | Mod: 52,PBBFAC,PN | Performed by: PHYSICIAN ASSISTANT

## 2022-05-09 PROCEDURE — 99211 OFF/OP EST MAY X REQ PHY/QHP: CPT | Mod: PBBFAC,27,PN | Performed by: PHYSICIAN ASSISTANT

## 2022-05-09 PROCEDURE — 99999 PR PBB SHADOW E&M-EST. PATIENT-LVL I: ICD-10-PCS | Mod: PBBFAC,,, | Performed by: PHYSICIAN ASSISTANT

## 2022-05-09 PROCEDURE — 99213 OFFICE O/P EST LOW 20 MIN: CPT | Mod: PBBFAC,PN | Performed by: OTOLARYNGOLOGY

## 2022-05-09 PROCEDURE — 69210 EAR CERUMEN REMOVAL: ICD-10-PCS | Mod: S$PBB,,, | Performed by: OTOLARYNGOLOGY

## 2022-05-09 PROCEDURE — 99999 PR PBB SHADOW E&M-EST. PATIENT-LVL III: ICD-10-PCS | Mod: PBBFAC,,, | Performed by: OTOLARYNGOLOGY

## 2022-05-09 RX ORDER — PREDNISOLONE ACETATE 10 MG/ML
SUSPENSION/ DROPS OPHTHALMIC
Qty: 10 ML | Refills: 2 | Status: SHIPPED | OUTPATIENT
Start: 2022-05-09 | End: 2024-03-21 | Stop reason: SDUPTHER

## 2022-05-09 RX ORDER — OFLOXACIN 3 MG/ML
3 SOLUTION AURICULAR (OTIC) 2 TIMES DAILY
Qty: 5 ML | Refills: 2 | Status: SHIPPED | OUTPATIENT
Start: 2022-05-09 | End: 2022-05-19

## 2022-05-09 RX ORDER — DONEPEZIL HYDROCHLORIDE 5 MG/1
5 TABLET, FILM COATED ORAL DAILY
Qty: 90 TABLET | Refills: 1 | Status: SHIPPED | OUTPATIENT
Start: 2022-05-09 | End: 2022-10-10 | Stop reason: SDUPTHER

## 2022-05-09 NOTE — PATIENT INSTRUCTIONS
Can continue fluocinolone oil on left ear and outer right ear as needed.      Use ofloxacin and prednisone drops in right ear as needed for drainage.  Use hair dryer if ear feels wet.

## 2022-05-09 NOTE — PROGRESS NOTES
Chief Complaint   Patient presents with    Follow-up     Improvement since last visit, possible cerumen in right ear          Right ear draining for 3 days over last week      HPI: Chloe Foster is 89 y.o. female who was previously seen by Dr. Alanis for cerumen impaction, central TM perforation from Qtip trauma, mixed hearing loss.  Now presents with right otorrhea for the past 3 days.  Slight pain in right ear. Had prednisolone and ofloxacin from previous treatments, so started those 3 days ago when otorrhea began.   Left ear has occasional itching, but no current issues.    Wears hearing aids.      Interval HPI:  Overall doing well.  Might have cerumen in right ear.  No complaints of otorrhea.  Needs audio today.           Past Medical History:   Diagnosis Date    Anxiety      Black eye 5/6/2013    Dementia without behavioral disturbance 4/15/2021    Dyslipidemia      Hyperthyroidism      IGT (impaired glucose tolerance)      Mitral valve prolapse      Nontoxic multinodular goiter 2/18/2013    Primary hypothyroidism 5/18/2015    Shingles       left side of chest and back    Thyroid nodule 2/18/2013    Urinary tract infection        Social History               Socioeconomic History    Marital status:    Occupational History    Occupation: Retired   Tobacco Use    Smoking status: Never Smoker    Smokeless tobacco: Never Used   Substance and Sexual Activity    Alcohol use: No       Alcohol/week: 0.0 standard drinks    Drug use: No    Sexual activity: Never   Social History Narrative     Her  passed away in March 2014. She lives with her  and has sitters and a son who helps. She has four children two of whom are twins. Her son is running the family business. Her children are all educated. She worked doing  for the [employer] [place]. She has an education degree and is a retired . She started a travel agency for 15 years. She is a non-smoker and  denies alcohol or substance abuse.                               Social Determinants of Health          Financial Resource Strain: Low Risk     Difficulty of Paying Living Expenses: Not hard at all   Food Insecurity: No Food Insecurity    Worried About Running Out of Food in the Last Year: Never true    Ran Out of Food in the Last Year: Never true   Transportation Needs: Unmet Transportation Needs    Lack of Transportation (Medical): Yes    Lack of Transportation (Non-Medical): No   Physical Activity: Inactive    Days of Exercise per Week: 0 days    Minutes of Exercise per Session: 0 min   Stress: No Stress Concern Present    Feeling of Stress : Not at all   Social Connections: Unknown    Frequency of Communication with Friends and Family: Never    Frequency of Social Gatherings with Friends and Family: Never    Active Member of Clubs or Organizations: No    Attends Club or Organization Meetings: Never    Marital Status:    Housing Stability: Low Risk     Unable to Pay for Housing in the Last Year: No    Number of Places Lived in the Last Year: 1    Unstable Housing in the Last Year: No               Past Surgical History:   Procedure Laterality Date    CATARACT EXTRACTION BILATERAL W/ ANTERIOR VITRECTOMY        DILATION AND CURETTAGE OF UTERUS         x2            Family History   Problem Relation Age of Onset    Thyroid disease Father      Thyroid disease Son      Thyroid disease Daughter      Diabetes Neg Hx      Breast cancer Neg Hx      Colon cancer Neg Hx      Ovarian cancer Neg Hx                 Review of Systems  General: negative for chills, fever or weight loss  Psychological: negative for mood changes or depression  Ophthalmic: negative for blurry vision, photophobia or eye pain  ENT: see HPI  Respiratory: no cough, shortness of breath, or wheezing  Cardiovascular: no chest pain or dyspnea on exertion  Gastrointestinal: no abdominal pain, change in bowel habits, or black/  bloody stools  Musculoskeletal: negative for gait disturbance or muscular weakness  Neurological: no syncope or seizures; no ataxia  Dermatological: negative for pruritis,  rash and jaundice  Hematologic/lymphatic: no easy bruising, no new adenopathy        Physical Exam:     There were no vitals filed for this visit.       Constitutional: Well appearing / communicating without difficutly.  NAD.  Eyes: EOM I Bilaterally  Head/Face: Normocephalic.  Negative paranasal sinus pressure/tenderness.  Salivary glands WNL.  House Brackmann I Bilaterally.     Right Ear: Auricle normal appearance. External Auditory Canal within normal limits no lesions or masses,TM w/o masses/lesions/perforations. TM mobility noted.   Left Ear: Auricle normal appearance. External Auditory Canal within normal limits no lesions or masses,TM w/o masses/lesions/perforations. TM mobility noted.  Nose: No gross nasal septal deviation. Inferior Turbinates 3+ bilaterally. No septal perforation. No masses/lesions. External nasal skin appears normal without masses/lesions.  Oral Cavity: Gingiva/lips within normal limits.  Dentition/gingiva healthy appearing. Mucus membranes moist. Floor of mouth soft, no masses palpated. Oral Tongue mobile. Hard Palate appears normal.    Oropharynx: Base of tongue appears normal. No masses/lesions noted. Tonsillar fossa/pharyngeal wall without lesions. Posterior oropharynx WNL.  Soft palate without masses. Midline uvula.   Neck/Lymphatic: No LAD I-VI bilaterally.  No thyromegaly.  No masses noted on exam.     See separate note for cerumen removal and binocular microscopic ear exam.       Audiogram:  Interpreted by me and reviewed with the patient today.  Mixed hearing loss right, sensorineural hearing loss left.  Left tympanogram normal.          Assessment:  Chloe was seen today for follow-up.    Diagnoses and all orders for this visit:    Otorrhea of right ear    Perforation of tympanic membrane, traumatic, right,  sequela    Impacted cerumen of right ear    Mixed conductive and sensorineural hearing loss, unilateral, right ear with restricted hearing on the contralateral side    Other orders  -     ofloxacin (FLOXIN) 0.3 % otic solution; Place 3 drops into both ears 2 (two) times daily. for 10 days  -     prednisoLONE acetate (PRED FORTE) 1 % DrpS; Apply 2 drops  to affected ear BID           Plan:    Patient given prescription for antibiotic and steroid drops to be used in the right ear as needed for any drainage.  Hair drier canal so be used if the ear feels wet.  Small amount of derm otic oil can be used just on the exterior ear as needed for irritation.    Left ear derm otic oil can be used weekly and as needed for itching or skin dryness.    Routine follow-up to check ear and microscopic exam in 6 months.  Repeat audiogram annually or more frequently if needed.           Carly Onofre MD

## 2022-05-09 NOTE — PROCEDURES
Ear Cerumen Removal    Date/Time: 5/9/2022 2:00 PM  Performed by: Carly Onofre MD  Authorized by: Carly Onofre MD     Consent Done?:  Yes (Verbal)    Local anesthetic:  None  Location details:  Both ears  Procedure type: curette    Cerumen  Removal Results:  Cerumen completely removed  Patient tolerance:  Patient tolerated the procedure well with no immediate complications     Dry skin and skin irritation bilateral ear canals, consistent with chronic otitis externa.  Right ear TM peforation unchanged from last microscopic exam.  Central perforation.  No active drainage, but dried otorrhea in EAC.           Ears:    Ears:        Carly Onofre MD  [facility]

## 2022-05-09 NOTE — PROGRESS NOTES
Chloe Foster, a 89 y.o. female, was seen today in the clinic for an audiologic evaluation.  Patients main complaint was hearing loss.      Audiogram results revealed a moderate to severe sensorineural hearing loss in the left ear and a severe to profound mixed hearing loss in the right ear.  Speech reception thresholds were noted at 60 dB in the right ear and 50 dB in the left ear.  Speech discrimination scores were 76% in the right ear and 56% in the left ear. Tympanometry revealed Type A in the left ear and a seal could not be obtained for the right ear (history of perforated eardrum).      Recommendations:  1. Otologic evaluation  2. Annual audiogram or as needed  3. Hearing protection when in noise  4. Continued and consistent use of bilateral amplification

## 2022-09-04 ENCOUNTER — PATIENT MESSAGE (OUTPATIENT)
Dept: INTERNAL MEDICINE | Facility: CLINIC | Age: 87
End: 2022-09-04
Payer: MEDICARE

## 2022-09-04 DIAGNOSIS — R00.2 PALPITATION: Primary | ICD-10-CM

## 2022-09-05 ENCOUNTER — PATIENT MESSAGE (OUTPATIENT)
Dept: INTERNAL MEDICINE | Facility: CLINIC | Age: 87
End: 2022-09-05
Payer: MEDICARE

## 2022-09-06 ENCOUNTER — PATIENT MESSAGE (OUTPATIENT)
Dept: INTERNAL MEDICINE | Facility: CLINIC | Age: 87
End: 2022-09-06
Payer: MEDICARE

## 2022-09-06 DIAGNOSIS — R00.2 PALPITATIONS: Primary | ICD-10-CM

## 2022-09-12 ENCOUNTER — OFFICE VISIT (OUTPATIENT)
Dept: FAMILY MEDICINE | Facility: CLINIC | Age: 87
End: 2022-09-12
Payer: MEDICARE

## 2022-09-12 VITALS
HEIGHT: 69 IN | HEART RATE: 80 BPM | BODY MASS INDEX: 24.09 KG/M2 | DIASTOLIC BLOOD PRESSURE: 60 MMHG | RESPIRATION RATE: 18 BRPM | WEIGHT: 162.63 LBS | OXYGEN SATURATION: 97 % | SYSTOLIC BLOOD PRESSURE: 112 MMHG

## 2022-09-12 DIAGNOSIS — F02.80 LATE ONSET ALZHEIMER'S DEMENTIA WITHOUT BEHAVIORAL DISTURBANCE: ICD-10-CM

## 2022-09-12 DIAGNOSIS — R06.09 EXERTIONAL DYSPNEA: ICD-10-CM

## 2022-09-12 DIAGNOSIS — E03.9 HYPOTHYROIDISM (ACQUIRED): ICD-10-CM

## 2022-09-12 DIAGNOSIS — R53.81 PHYSICAL DECONDITIONING: Primary | ICD-10-CM

## 2022-09-12 DIAGNOSIS — Z00.00 ENCOUNTER FOR ANNUAL PHYSICAL EXAM: ICD-10-CM

## 2022-09-12 DIAGNOSIS — G30.1 LATE ONSET ALZHEIMER'S DEMENTIA WITHOUT BEHAVIORAL DISTURBANCE: ICD-10-CM

## 2022-09-12 PROCEDURE — 99215 OFFICE O/P EST HI 40 MIN: CPT | Mod: PBBFAC,PN | Performed by: STUDENT IN AN ORGANIZED HEALTH CARE EDUCATION/TRAINING PROGRAM

## 2022-09-12 PROCEDURE — 99999 PR PBB SHADOW E&M-EST. PATIENT-LVL V: ICD-10-PCS | Mod: PBBFAC,,, | Performed by: STUDENT IN AN ORGANIZED HEALTH CARE EDUCATION/TRAINING PROGRAM

## 2022-09-12 PROCEDURE — 99999 PR PBB SHADOW E&M-EST. PATIENT-LVL V: CPT | Mod: PBBFAC,,, | Performed by: STUDENT IN AN ORGANIZED HEALTH CARE EDUCATION/TRAINING PROGRAM

## 2022-09-12 PROCEDURE — 99214 PR OFFICE/OUTPT VISIT, EST, LEVL IV, 30-39 MIN: ICD-10-PCS | Mod: S$PBB,,, | Performed by: STUDENT IN AN ORGANIZED HEALTH CARE EDUCATION/TRAINING PROGRAM

## 2022-09-12 PROCEDURE — 99214 OFFICE O/P EST MOD 30 MIN: CPT | Mod: S$PBB,,, | Performed by: STUDENT IN AN ORGANIZED HEALTH CARE EDUCATION/TRAINING PROGRAM

## 2022-09-12 NOTE — PROGRESS NOTES
Subjective:       Patient ID: Chloe Foster is a 89 y.o. female.    Chief Complaint: Shortness of Breath (Started about 2 weeks ago ), Exhaustion, GI Problem, Diarrhea, Memory Loss, and Anxiety    HPI    89 yo pleasant Female with h/o dementia (mildly dependent on family member(son) on ADLs, sHTN, HLD, hypothyroidism and MVP presenting to [facility] care today. The son who is the primary care giver has noticed progressive decrease in strength and activities in his mum with patient complaining of fatigue and shortness of breath especially on exertion. They both endorsed good appetite although she has lost about 12 lbs in the past 1-3months after recent acute watery diarrhea episode, no h/o fever, chills, abdominal/leg swelling, orthopnea, PND, chest pain , palpitations, syncope or presyncope, dysuria,frequency, nausea, vomiting. She has also been intermittently anxious in the evenings but calms down through redirection by family members. Otherwise no recent hospitalizations or medication side effect.    Past Medical History:   Diagnosis Date    Anxiety     Black eye 5/6/2013    Dementia without behavioral disturbance 4/15/2021    Dyslipidemia     Hyperthyroidism     IGT (impaired glucose tolerance)     Mitral valve prolapse     Nontoxic multinodular goiter 2/18/2013    Primary hypothyroidism 5/18/2015    Shingles     left side of chest and back    Thyroid nodule 2/18/2013    Urinary tract infection        Past Surgical History:   Procedure Laterality Date    CATARACT EXTRACTION BILATERAL W/ ANTERIOR VITRECTOMY      DILATION AND CURETTAGE OF UTERUS      x2       Family History   Problem Relation Age of Onset    Thyroid disease Father     Thyroid disease Son     Thyroid disease Daughter     Diabetes Neg Hx     Breast cancer Neg Hx     Colon cancer Neg Hx     Ovarian cancer Neg Hx        Social History     Socioeconomic History    Marital status:    Occupational History    Occupation: Retired   Tobacco Use     Smoking status: Never    Smokeless tobacco: Never   Substance and Sexual Activity    Alcohol use: No     Alcohol/week: 0.0 standard drinks    Drug use: No    Sexual activity: Never   Social History Narrative    Her  passed away in March 2014. She lives with her  and has sitters and a son who helps. She has four children two of whom are twins. Her son is running the family business. Her children are all educated. She worked doing  for the [employer]. She has an education degree and is a retired . She started a travel agency for 15 years. She is a non-smoker and denies alcohol or substance abuse.                      Social Determinants of Health     Financial Resource Strain: Low Risk     Difficulty of Paying Living Expenses: Not hard at all   Food Insecurity: No Food Insecurity    Worried About Running Out of Food in the Last Year: Never true    Ran Out of Food in the Last Year: Never true   Transportation Needs: No Transportation Needs    Lack of Transportation (Medical): No    Lack of Transportation (Non-Medical): No   Physical Activity: Inactive    Days of Exercise per Week: 0 days    Minutes of Exercise per Session: 0 min   Stress: Stress Concern Present    Feeling of Stress : To some extent   Social Connections: Unknown    Frequency of Communication with Friends and Family: Three times a week    Frequency of Social Gatherings with Friends and Family: Patient refused    Active Member of Clubs or Organizations: Patient refused    Attends Club or Organization Meetings: Patient refused    Marital Status:    Housing Stability: Low Risk     Unable to Pay for Housing in the Last Year: No    Number of Places Lived in the Last Year: 1    Unstable Housing in the Last Year: No       Review of Systems   Constitutional:  Negative for fatigue and fever.   Respiratory:  Negative for cough and chest tightness.    Gastrointestinal:  Negative for abdominal pain, diarrhea and  "vomiting.   Endocrine: Negative for polydipsia and polyphagia.   Genitourinary:  Negative for difficulty urinating, dysuria and frequency.   Musculoskeletal:  Negative for arthralgias, back pain, gait problem and joint swelling.   Skin:  Negative for rash.   Neurological:  Negative for seizures, weakness, numbness and headaches.   Psychiatric/Behavioral:  Negative for sleep disturbance.        Objective:     Vitals:    09/12/22 1030   BP: 112/60   BP Location: Left arm   Patient Position: Sitting   BP Method: Large (Manual)   Pulse: 80   Resp: 18   SpO2: 97%   Weight: 73.8 kg (162 lb 9.6 oz)   Height: 5' 9" (1.753 m)          Physical Exam  Constitutional:       Appearance: Normal appearance. She is normal weight.   HENT:      Mouth/Throat:      Mouth: Mucous membranes are moist.      Pharynx: Oropharynx is clear.   Eyes:      Extraocular Movements: Extraocular movements intact.      Conjunctiva/sclera: Conjunctivae normal.      Pupils: Pupils are equal, round, and reactive to light.   Cardiovascular:      Rate and Rhythm: Normal rate and regular rhythm.      Heart sounds: Murmur: opening snap.   Pulmonary:      Effort: Pulmonary effort is normal.      Breath sounds: Normal breath sounds. No wheezing or rales.   Abdominal:      General: Abdomen is flat. Bowel sounds are normal.      Palpations: Abdomen is soft.   Skin:     General: Skin is warm.      Coloration: Skin is not jaundiced.      Findings: No bruising.   Neurological:      Mental Status: She is alert.         Laboratory:  CBC:  No results for input(s): WBC, RBC, HGB, HCT, PLT, MCV, MCH, MCHC in the last 2160 hours.  CMP:  No results for input(s): GLU, CALCIUM, ALBUMIN, PROT, NA, K, CO2, CL, BUN, ALKPHOS, ALT, AST, BILITOT in the last 2160 hours.    Invalid input(s): CREATININ  URINALYSIS:  No results for input(s): COLORU, CLARITYU, SPECGRAV, PHUR, PROTEINUA, GLUCOSEU, BILIRUBINCON, BLOODU, WBCU, RBCU, BACTERIA, MUCUS, NITRITE, LEUKOCYTESUR, UROBILINOGEN, " HYALINECASTS in the last 2160 hours.   LIPIDS:  No results for input(s): TSH, HDL, CHOL, TRIG, LDLCALC, CHOLHDL, NONHDLCHOL, TOTALCHOLEST in the last 2160 hours.  TSH:  No results for input(s): TSH in the last 2160 hours.  A1C:  No results for input(s): HGBA1C in the last 2160 hours.    Assessment:         ICD-10-CM ICD-9-CM   1. Physical deconditioning  R53.81 799.3   2. Late onset Alzheimer's dementia without behavioral disturbance  G30.1 331.0    F02.80 294.10   3. Encounter for annual physical exam  Z00.00 V70.0   4. Hypothyroidism (acquired)  E03.9 244.9   5. Exertional dyspnea  R06.00 786.09       Plan:       Physical deconditioning       -Likely due to senility  -     Ambulatory referral/consult to Home Health with physical therapy at least 3 times daily; Future; Expected date: 09/13/2022    Late onset Alzheimer's dementia without behavioral disturbance  -except intermittent bouts of anxiety which is easily corrected with redirection  -watchful waiting for now  -c/w zoloft    Encounter for annual physical exam  -not due till october    Hypothyroidism (acquired)  -last TSH WNL  -c/w 75mcg for now    Exertional dyspnea  -negative cardiac/lung symptoms  -likely due to deconditioning from senility  -no indication for aggressive workup  -PT for now    Follow up in about 4 weeks (around 10/10/2022).     Patient's Medications   New Prescriptions    No medications on file   Previous Medications    ANTIOX#10/OM3/DHA/EPA/LUT/ZEAX (I-CAPS ORAL)    Take 2 capsules by mouth once daily.    CALCIUM-VITAMIN D (OSCAL) 250 (625)-125 MG-UNIT PER TABLET    Take 1 tablet by mouth 2 (two) times daily.    DONEPEZIL (ARICEPT) 5 MG TABLET    Take 1 tablet (5 mg total) by mouth once daily.    FLUOCINOLONE ACETONIDE OIL (DERMOTIC OIL) 0.01 % DROP    Place 3 drops in ear(s) 2 (two) times daily.    LEVOTHYROXINE (SYNTHROID) 75 MCG TABLET    Take 1 tablet (75 mcg total) by mouth before breakfast.    MULTIVIT-IRON-FA-CALCIUM-MINS 18 MG  IRON-400 MCG-500 MG CA TAB    Take 1 tablet by mouth once daily.    OFLOXACIN (OCUFLOX) 0.3 % OPHTHALMIC SOLUTION    Apply 2-3 drops in right ear twice daily for 5-7 days    PREDNISOLONE ACETATE (PRED FORTE) 1 % DRPS    Apply 2 drops  to affected ear BID    SERTRALINE (ZOLOFT) 100 MG TABLET    TAKE 1 TABLET BY MOUTH ONCE DAILY    TRIAMCINOLONE ACETONIDE 0.1% (KENALOG) 0.1 % OINTMENT    Apply topically 2 (two) times daily. To external ears for 7 days   Modified Medications    No medications on file   Discontinued Medications    PRAVASTATIN (PRAVACHOL) 40 MG TABLET    TAKE 1 TABLET BY MOUTH ONCE DAILY       Patient and son were given opportunity to express concerns, ask questions and verbalizes understanding of current management plan.    Denise Naqvi MD

## 2022-09-15 PROCEDURE — G0180 MD CERTIFICATION HHA PATIENT: HCPCS | Mod: ,,, | Performed by: STUDENT IN AN ORGANIZED HEALTH CARE EDUCATION/TRAINING PROGRAM

## 2022-09-15 PROCEDURE — G0180 PR HOME HEALTH MD CERTIFICATION: ICD-10-PCS | Mod: ,,, | Performed by: STUDENT IN AN ORGANIZED HEALTH CARE EDUCATION/TRAINING PROGRAM

## 2022-09-19 ENCOUNTER — PES CALL (OUTPATIENT)
Dept: ADMINISTRATIVE | Facility: OTHER | Age: 87
End: 2022-09-19
Payer: MEDICARE

## 2022-10-03 ENCOUNTER — PATIENT MESSAGE (OUTPATIENT)
Dept: FAMILY MEDICINE | Facility: CLINIC | Age: 87
End: 2022-10-03
Payer: MEDICARE

## 2022-10-10 ENCOUNTER — OFFICE VISIT (OUTPATIENT)
Dept: FAMILY MEDICINE | Facility: CLINIC | Age: 87
End: 2022-10-10
Payer: MEDICARE

## 2022-10-10 VITALS
DIASTOLIC BLOOD PRESSURE: 58 MMHG | BODY MASS INDEX: 23.51 KG/M2 | HEIGHT: 69 IN | SYSTOLIC BLOOD PRESSURE: 110 MMHG | HEART RATE: 83 BPM | WEIGHT: 158.75 LBS | OXYGEN SATURATION: 98 %

## 2022-10-10 DIAGNOSIS — R82.6 ABNORMAL URINE LEVELS OF SUBSTANCES CHIEFLY NONMEDICINAL AS TO SOURCE: ICD-10-CM

## 2022-10-10 DIAGNOSIS — G30.1 MILD LATE ONSET ALZHEIMER'S DEMENTIA WITHOUT BEHAVIORAL DISTURBANCE, PSYCHOTIC DISTURBANCE, MOOD DISTURBANCE, OR ANXIETY: Primary | ICD-10-CM

## 2022-10-10 DIAGNOSIS — E78.2 MIXED HYPERLIPIDEMIA: ICD-10-CM

## 2022-10-10 DIAGNOSIS — F02.A0 MILD LATE ONSET ALZHEIMER'S DEMENTIA WITHOUT BEHAVIORAL DISTURBANCE, PSYCHOTIC DISTURBANCE, MOOD DISTURBANCE, OR ANXIETY: Primary | ICD-10-CM

## 2022-10-10 DIAGNOSIS — Z00.00 ANNUAL PHYSICAL EXAM: ICD-10-CM

## 2022-10-10 DIAGNOSIS — Z23 FLU VACCINE NEED: ICD-10-CM

## 2022-10-10 DIAGNOSIS — I10 ESSENTIAL HYPERTENSION: ICD-10-CM

## 2022-10-10 DIAGNOSIS — R53.83 FATIGUE, UNSPECIFIED TYPE: ICD-10-CM

## 2022-10-10 DIAGNOSIS — E03.9 ACQUIRED HYPOTHYROIDISM: Primary | ICD-10-CM

## 2022-10-10 DIAGNOSIS — E08.65 DIABETES MELLITUS DUE TO UNDERLYING CONDITION WITH HYPERGLYCEMIA: ICD-10-CM

## 2022-10-10 DIAGNOSIS — Z13.1 SCREENING FOR DIABETES MELLITUS: ICD-10-CM

## 2022-10-10 DIAGNOSIS — E89.0 POSTABLATIVE HYPOTHYROIDISM: ICD-10-CM

## 2022-10-10 PROCEDURE — G0008 ADMIN INFLUENZA VIRUS VAC: HCPCS | Mod: PBBFAC,PN

## 2022-10-10 PROCEDURE — 99213 OFFICE O/P EST LOW 20 MIN: CPT | Mod: S$PBB,,, | Performed by: STUDENT IN AN ORGANIZED HEALTH CARE EDUCATION/TRAINING PROGRAM

## 2022-10-10 PROCEDURE — 99214 OFFICE O/P EST MOD 30 MIN: CPT | Mod: PBBFAC,PN,25 | Performed by: STUDENT IN AN ORGANIZED HEALTH CARE EDUCATION/TRAINING PROGRAM

## 2022-10-10 PROCEDURE — 99999 PR PBB SHADOW E&M-EST. PATIENT-LVL IV: ICD-10-PCS | Mod: PBBFAC,,, | Performed by: STUDENT IN AN ORGANIZED HEALTH CARE EDUCATION/TRAINING PROGRAM

## 2022-10-10 PROCEDURE — 99213 PR OFFICE/OUTPT VISIT, EST, LEVL III, 20-29 MIN: ICD-10-PCS | Mod: S$PBB,,, | Performed by: STUDENT IN AN ORGANIZED HEALTH CARE EDUCATION/TRAINING PROGRAM

## 2022-10-10 PROCEDURE — 99999 PR PBB SHADOW E&M-EST. PATIENT-LVL IV: CPT | Mod: PBBFAC,,, | Performed by: STUDENT IN AN ORGANIZED HEALTH CARE EDUCATION/TRAINING PROGRAM

## 2022-10-10 RX ORDER — DICLOFENAC SODIUM 10 MG/G
2 GEL TOPICAL 2 TIMES DAILY
Qty: 350 G | Refills: 3 | Status: SHIPPED | OUTPATIENT
Start: 2022-10-10

## 2022-10-10 RX ORDER — LEVOTHYROXINE SODIUM 75 UG/1
75 TABLET ORAL
Qty: 90 TABLET | Refills: 3 | Status: SHIPPED | OUTPATIENT
Start: 2022-10-10 | End: 2023-10-24 | Stop reason: SDUPTHER

## 2022-10-10 RX ORDER — SERTRALINE HYDROCHLORIDE 100 MG/1
100 TABLET, FILM COATED ORAL DAILY
Qty: 90 TABLET | Refills: 3 | Status: SHIPPED | OUTPATIENT
Start: 2022-10-10 | End: 2023-10-24 | Stop reason: SDUPTHER

## 2022-10-10 RX ORDER — DONEPEZIL HYDROCHLORIDE 5 MG/1
5 TABLET, FILM COATED ORAL DAILY
Qty: 90 TABLET | Refills: 3 | Status: SHIPPED | OUTPATIENT
Start: 2022-10-10 | End: 2022-11-01 | Stop reason: SDUPTHER

## 2022-10-10 NOTE — PROGRESS NOTES
Subjective:       Patient ID: Chloe Foster is a 89 y.o. female.    Chief Complaint: Follow-up, Fatigue, Shortness of Breath, and Hand Pain (Rt last few days its swollen )    Follow-up  Associated symptoms include fatigue. Pertinent negatives include no abdominal pain, arthralgias, coughing, fever, headaches, joint swelling, numbness, rash, vomiting or weakness.   Fatigue  Associated symptoms include fatigue. Pertinent negatives include no abdominal pain, arthralgias, coughing, fever, headaches, joint swelling, numbness, rash, vomiting or weakness.   Shortness of Breath  Pertinent negatives include no abdominal pain, fever, headaches, rash or vomiting.   Hand Pain   Pertinent negatives include no numbness.     89 yo pleasant Female with h/o dementia (mildly dependent on family member(son) on ADLs, sHTN, HLD, hypothyroidism and MVP presenting for annual wellness visit. The son who is the primary care giver noticed  Right wrist swelling yesterday with associated pain and difficulty closing her hand but responded to tylenol, no preceding trauma, swelling or pain in other joints. She also continues to c/o lack of energy and disinterest in things around her but she does have good appetite, reports no constipation, recent hospitalizations or medication side effect.    ROS: As per HPI    Past Medical History:   Diagnosis Date    Anxiety     Black eye 5/6/2013    Dementia without behavioral disturbance 4/15/2021    Dyslipidemia     Essential hypertension 10/10/2022    Hyperthyroidism     IGT (impaired glucose tolerance)     Mitral valve prolapse     Nontoxic multinodular goiter 2/18/2013    Primary hypothyroidism 5/18/2015    Shingles     left side of chest and back    Thyroid nodule 2/18/2013    Urinary tract infection        Past Surgical History:   Procedure Laterality Date    CATARACT EXTRACTION BILATERAL W/ ANTERIOR VITRECTOMY      DILATION AND CURETTAGE OF UTERUS      x2       Family History   Problem Relation Age  of Onset    Thyroid disease Father     Thyroid disease Son     Thyroid disease Daughter     Diabetes Neg Hx     Breast cancer Neg Hx     Colon cancer Neg Hx     Ovarian cancer Neg Hx        Social History     Socioeconomic History    Marital status:    Occupational History    Occupation: Retired   Tobacco Use    Smoking status: Never    Smokeless tobacco: Never   Substance and Sexual Activity    Alcohol use: No     Alcohol/week: 0.0 standard drinks    Drug use: No    Sexual activity: Never   Social History Narrative    Her  passed away in March 2014. She lives with her  and has sitters and a son who helps. She has four children two of whom are twins. Her son is running the family business. Her children are all educated. She worked doing  for the [employer]. She has an education degree and is a retired . She started a travel agency for 15 years. She is a non-smoker and denies alcohol or substance abuse.                      Social Determinants of Health     Financial Resource Strain: Unknown    Difficulty of Paying Living Expenses: Patient refused   Food Insecurity: Unknown    Worried About Running Out of Food in the Last Year: Patient refused    Ran Out of Food in the Last Year: Patient refused   Transportation Needs: Unknown    Lack of Transportation (Medical): Patient refused    Lack of Transportation (Non-Medical): Patient refused   Physical Activity: Unknown    Days of Exercise per Week: Patient refused    Minutes of Exercise per Session: 0 min   Stress: Unknown    Feeling of Stress : Patient refused   Social Connections: Unknown    Frequency of Communication with Friends and Family: Patient refused    Frequency of Social Gatherings with Friends and Family: Patient refused    Active Member of Clubs or Organizations: Patient refused    Attends Club or Organization Meetings: Patient refused    Marital Status: Patient refused   Housing Stability: Low Risk      "Unable to Pay for Housing in the Last Year: No    Number of Places Lived in the Last Year: 1    Unstable Housing in the Last Year: No         Objective:     Vitals:    10/10/22 1420   BP: (!) 110/58   BP Location: Left arm   Patient Position: Sitting   BP Method: Large (Manual)   Pulse: 83   SpO2: 98%   Weight: 72 kg (158 lb 11.7 oz)   Height: 5' 9" (1.753 m)          Physical Exam  Vitals reviewed.   Constitutional:       Appearance: Normal appearance. She is normal weight.   HENT:      Mouth/Throat:      Mouth: Mucous membranes are moist.      Pharynx: Oropharynx is clear.   Eyes:      Extraocular Movements: Extraocular movements intact.      Conjunctiva/sclera: Conjunctivae normal.      Pupils: Pupils are equal, round, and reactive to light.   Cardiovascular:      Rate and Rhythm: Normal rate and regular rhythm.      Heart sounds: Murmur: opening snap.   Pulmonary:      Effort: Pulmonary effort is normal.      Breath sounds: Normal breath sounds. No stridor. No wheezing or rales.   Abdominal:      General: Abdomen is flat. Bowel sounds are normal.      Palpations: Abdomen is soft.   Skin:     General: Skin is warm.      Coloration: Skin is not jaundiced.      Findings: No bruising.   Neurological:      Mental Status: She is alert.         Laboratory:  CBC:  Recent Labs   Lab Result Units 10/10/22  1416   WBC K/uL 8.15   RBC M/uL 3.99*   Hemoglobin g/dL 12.1   Hematocrit % 37.2   Platelets K/uL 236   MCV fL 93   MCH pg 30.3   MCHC g/dL 32.5     CMP:  Recent Labs   Lab Result Units 10/10/22  1415   Glucose mg/dL 88   Calcium mg/dL 9.0   Albumin g/dL 3.9   Total Protein g/dL 7.2   Sodium mmol/L 139   Potassium mmol/L 4.2   CO2 mmol/L 28   Chloride mmol/L 101   BUN mg/dL 32*   Alkaline Phosphatase U/L 105   ALT U/L 19   AST U/L 30   Total Bilirubin mg/dL 0.5     URINALYSIS:  No results for input(s): COLORU, CLARITYU, SPECGRAV, PHUR, PROTEINUA, GLUCOSEU, BILIRUBINCON, BLOODU, WBCU, RBCU, BACTERIA, MUCUS, NITRITE, " LEUKOCYTESUR, UROBILINOGEN, HYALINECASTS in the last 2160 hours.   LIPIDS:  Recent Labs   Lab Result Units 10/10/22  1415   TSH uIU/mL 2.090     TSH:  Recent Labs   Lab Result Units 10/10/22  1415   TSH uIU/mL 2.090     A1C:  No results for input(s): HGBA1C in the last 2160 hours.    Assessment:         ICD-10-CM ICD-9-CM   1. Mild late onset Alzheimer's dementia without behavioral disturbance, psychotic disturbance, mood disturbance, or anxiety  G30.1 331.0    F02.A0 294.10   2. Annual physical exam  Z00.00 V70.0   3. Flu vaccine need  Z23 V04.81   4. Essential hypertension  I10 401.9   5. Fatigue, unspecified type  R53.83 780.79   6. Postablative hypothyroidism  E89.0 244.1       Plan:       Encounter for Annual physicals  -Labs ordered  -for flu vaccine today  -already completed shingles, PCV, tetanus    Fatigue due to Physical deconditioning       -Likely due to senility  -c/w Health with physical therapy at least 3 times daily; Future; Expected date: 09/13/2022    Late onset Alzheimer's dementia without behavioral disturbance  -except intermittent bouts of anxiety which is easily corrected with redirection  -watchful waiting for now  -c/w zoloft    Hypothyroidism (acquired)  - TSH WNL  -c/w 75mcg for now    Patient's Medications   New Prescriptions    DICLOFENAC SODIUM (VOLTAREN) 1 % GEL    Apply 2 g topically 2 (two) times daily.   Previous Medications    ANTIOX#10/OM3/DHA/EPA/LUT/ZEAX (I-CAPS ORAL)    Take 2 capsules by mouth once daily.    CALCIUM-VITAMIN D (OSCAL) 250 (625)-125 MG-UNIT PER TABLET    Take 1 tablet by mouth 2 (two) times daily.    FLUOCINOLONE ACETONIDE OIL (DERMOTIC OIL) 0.01 % DROP    Place 3 drops in ear(s) 2 (two) times daily.    MULTIVIT-IRON-FA-CALCIUM-MINS 18 MG IRON-400 MCG-500 MG CA TAB    Take 1 tablet by mouth once daily.    OFLOXACIN (OCUFLOX) 0.3 % OPHTHALMIC SOLUTION    Apply 2-3 drops in right ear twice daily for 5-7 days    PREDNISOLONE ACETATE (PRED FORTE) 1 % DRPS    Apply 2  drops  to affected ear BID    TRIAMCINOLONE ACETONIDE 0.1% (KENALOG) 0.1 % OINTMENT    Apply topically 2 (two) times daily. To external ears for 7 days   Modified Medications    Modified Medication Previous Medication    DONEPEZIL (ARICEPT) 5 MG TABLET donepeziL (ARICEPT) 5 MG tablet       Take 1 tablet (5 mg total) by mouth once daily.    Take 1 tablet (5 mg total) by mouth once daily.    LEVOTHYROXINE (SYNTHROID) 75 MCG TABLET levothyroxine (SYNTHROID) 75 MCG tablet       Take 1 tablet (75 mcg total) by mouth before breakfast.    Take 1 tablet (75 mcg total) by mouth before breakfast.    SERTRALINE (ZOLOFT) 100 MG TABLET sertraline (ZOLOFT) 100 MG tablet       Take 1 tablet (100 mg total) by mouth once daily.    TAKE 1 TABLET BY MOUTH ONCE DAILY   Discontinued Medications    No medications on file       Patient and son were given opportunity to express concerns, ask questions and verbalizes understanding of current management plan.    Denise Naqvi MD

## 2022-10-14 ENCOUNTER — PATIENT MESSAGE (OUTPATIENT)
Dept: FAMILY MEDICINE | Facility: CLINIC | Age: 87
End: 2022-10-14
Payer: MEDICARE

## 2022-10-20 ENCOUNTER — EXTERNAL HOME HEALTH (OUTPATIENT)
Dept: HOME HEALTH SERVICES | Facility: HOSPITAL | Age: 87
End: 2022-10-20
Payer: MEDICARE

## 2022-10-25 ENCOUNTER — IMMUNIZATION (OUTPATIENT)
Dept: FAMILY MEDICINE | Facility: CLINIC | Age: 87
End: 2022-10-25
Payer: MEDICARE

## 2022-10-25 DIAGNOSIS — Z23 NEED FOR VACCINATION: Primary | ICD-10-CM

## 2022-10-25 PROCEDURE — 91312 COVID-19, MRNA, LNP-S, BIVALENT BOOSTER, PF, 30 MCG/0.3 ML DOSE: CPT | Mod: PBBFAC,PN

## 2022-10-25 PROCEDURE — 0124A COVID-19, MRNA, LNP-S, BIVALENT BOOSTER, PF, 30 MCG/0.3 ML DOSE: CPT | Mod: PBBFAC,PN

## 2022-12-22 ENCOUNTER — DOCUMENT SCAN (OUTPATIENT)
Dept: HOME HEALTH SERVICES | Facility: HOSPITAL | Age: 87
End: 2022-12-22
Payer: MEDICARE

## 2023-01-09 PROBLEM — Z00.00 ANNUAL PHYSICAL EXAM: Status: RESOLVED | Noted: 2022-09-12 | Resolved: 2023-01-09

## 2023-02-01 ENCOUNTER — DOCUMENT SCAN (OUTPATIENT)
Dept: HOME HEALTH SERVICES | Facility: HOSPITAL | Age: 88
End: 2023-02-01
Payer: MEDICARE

## 2023-02-08 ENCOUNTER — OFFICE VISIT (OUTPATIENT)
Dept: OTOLARYNGOLOGY | Facility: CLINIC | Age: 88
End: 2023-02-08
Payer: MEDICARE

## 2023-02-08 ENCOUNTER — CLINICAL SUPPORT (OUTPATIENT)
Dept: OTOLARYNGOLOGY | Facility: CLINIC | Age: 88
End: 2023-02-08
Payer: MEDICARE

## 2023-02-08 VITALS
BODY MASS INDEX: 23.73 KG/M2 | HEART RATE: 81 BPM | DIASTOLIC BLOOD PRESSURE: 62 MMHG | WEIGHT: 160.19 LBS | SYSTOLIC BLOOD PRESSURE: 104 MMHG | HEIGHT: 69 IN

## 2023-02-08 DIAGNOSIS — S09.21XS: Chronic | ICD-10-CM

## 2023-02-08 DIAGNOSIS — H90.A31 MIXED CONDUCTIVE AND SENSORINEURAL HEARING LOSS OF RIGHT EAR WITH RESTRICTED HEARING OF LEFT EAR: Primary | ICD-10-CM

## 2023-02-08 DIAGNOSIS — H90.3 SENSORINEURAL HEARING LOSS (SNHL) OF BOTH EARS: Chronic | ICD-10-CM

## 2023-02-08 DIAGNOSIS — H92.11 OTORRHEA OF RIGHT EAR: ICD-10-CM

## 2023-02-08 DIAGNOSIS — H60.63 CHRONIC OTITIS EXTERNA OF BOTH EARS, UNSPECIFIED TYPE: Chronic | ICD-10-CM

## 2023-02-08 DIAGNOSIS — H61.21 IMPACTED CERUMEN OF RIGHT EAR: ICD-10-CM

## 2023-02-08 PROCEDURE — 99999 PR PBB SHADOW E&M-EST. PATIENT-LVL III: ICD-10-PCS | Mod: PBBFAC,,, | Performed by: OTOLARYNGOLOGY

## 2023-02-08 PROCEDURE — 99214 PR OFFICE/OUTPT VISIT, EST, LEVL IV, 30-39 MIN: ICD-10-PCS | Mod: 25,S$PBB,, | Performed by: OTOLARYNGOLOGY

## 2023-02-08 PROCEDURE — 99214 OFFICE O/P EST MOD 30 MIN: CPT | Mod: 25,S$PBB,, | Performed by: OTOLARYNGOLOGY

## 2023-02-08 PROCEDURE — 92557 COMPREHENSIVE HEARING TEST: CPT | Mod: PBBFAC,PN | Performed by: PHYSICIAN ASSISTANT

## 2023-02-08 PROCEDURE — 92567 TYMPANOMETRY: CPT | Mod: PBBFAC,PN | Performed by: PHYSICIAN ASSISTANT

## 2023-02-08 PROCEDURE — 99213 OFFICE O/P EST LOW 20 MIN: CPT | Mod: PBBFAC,PN | Performed by: OTOLARYNGOLOGY

## 2023-02-08 PROCEDURE — 99999 PR PBB SHADOW E&M-EST. PATIENT-LVL III: CPT | Mod: PBBFAC,,, | Performed by: OTOLARYNGOLOGY

## 2023-02-08 PROCEDURE — 69210 REMOVE IMPACTED EAR WAX UNI: CPT | Mod: PBBFAC,PN | Performed by: OTOLARYNGOLOGY

## 2023-02-08 PROCEDURE — 69210 EAR CERUMEN REMOVAL: ICD-10-PCS | Mod: S$PBB,,, | Performed by: OTOLARYNGOLOGY

## 2023-02-08 NOTE — PROGRESS NOTES
Chloe Foster, a 89 y.o. female, was seen today in the clinic for an audiologic evaluation.  Ms. Foster's family has noticed a decrease in her hearing in the right ear within the last month.  Ms. Foster admits to wearing her Phon[employer] hearing aids on a daily basis and she appreciates the benefit she receives from them.    Audiogram results revealed a moderate to profound sensorineural hearing loss bilaterally, worse in the left ear, with a conductive component in the right ear.  Speech reception thresholds were noted at 60 dB in the right ear and 50 dB in the left ear.  Speech discrimination scores were 68% in the right ear and 48% in the left ear.  Tympanometry revealed Type B in the right ear and Type A in the left ear.     Recommendations:  Otologic evaluation  Annual audiogram  Hearing protection when in noise  Continued and consistent use of bilateral amplification

## 2023-02-08 NOTE — PATIENT INSTRUCTIONS
Continue to use ear drops as needed for ear drainage.  Hair dryer can be used on the right ear if there is drainage or wet feeling.      I would recommend the use of a wax softening drop, either over the counter Debrox, baby oil, or mineral oil, on a weekly basis in the left ear.  I also instructed the patient to avoid Qtips.     Perforation well healed.  No current drainage.      Would recommend a hearing aid adjustment to ensure that settings are adjusted to the slight worsening in hearing.

## 2023-02-08 NOTE — PROGRESS NOTES
Chief Complaint   Patient presents with    Hearing Loss     X one month-- noticed change in hearing      HPI: Chloe Foster is 89 y.o. female who was previously seen by Dr. Alanis for cerumen impaction, central TM perforation from Qtip trauma, mixed hearing loss.  Now presents with right otorrhea for the past 3 days.  Slight pain in right ear. Had prednisolone and ofloxacin from previous treatments, so started those 3 days ago when otorrhea began.   Left ear has occasional itching, but no current issues.    Wears hearing aids.      Interval HPI 5/9/2022:  Overall doing well.  Might have cerumen in right ear.  No complaints of otorrhea.  Needs audio today.     Interval HPI 2/8/2023:  Follow up visit. Per son, her right hearing seem to be getting worse within the last month. She also had drainage in the right ear last month but has resolved with the ear drops. She saw Dr. Humphreys on 10/1/2020 with R. perforated TM. She wears hearing aids.               Past Medical History:   Diagnosis Date    Anxiety      Black eye 5/6/2013    Dementia without behavioral disturbance 4/15/2021    Dyslipidemia      Hyperthyroidism      IGT (impaired glucose tolerance)      Mitral valve prolapse      Nontoxic multinodular goiter 2/18/2013    Primary hypothyroidism 5/18/2015    Shingles       left side of chest and back    Thyroid nodule 2/18/2013    Urinary tract infection        Social History               Socioeconomic History    Marital status:    Occupational History    Occupation: Retired   Tobacco Use    Smoking status: Never Smoker    Smokeless tobacco: Never Used   Substance and Sexual Activity    Alcohol use: No       Alcohol/week: 0.0 standard drinks    Drug use: No    Sexual activity: Never   Social History Narrative     Her  passed away in March 2014. She lives with her  and has sitters and a son who helps. She has four children two of whom are twins. Her son is running the family business. Her  children are all educated. She worked doing  for the [employer]. She has an education degree and is a retired . She started a travel agency for 15 years. She is a non-smoker and denies alcohol or substance abuse.                               Social Determinants of Health          Financial Resource Strain: Low Risk     Difficulty of Paying Living Expenses: Not hard at all   Food Insecurity: No Food Insecurity    Worried About Running Out of Food in the Last Year: Never true    Ran Out of Food in the Last Year: Never true   Transportation Needs: Unmet Transportation Needs    Lack of Transportation (Medical): Yes    Lack of Transportation (Non-Medical): No   Physical Activity: Inactive    Days of Exercise per Week: 0 days    Minutes of Exercise per Session: 0 min   Stress: No Stress Concern Present    Feeling of Stress : Not at all   Social Connections: Unknown    Frequency of Communication with Friends and Family: Never    Frequency of Social Gatherings with Friends and Family: Never    Active Member of Clubs or Organizations: No    Attends Club or Organization Meetings: Never    Marital Status:    Housing Stability: Low Risk     Unable to Pay for Housing in the Last Year: No    Number of Places Lived in the Last Year: 1    Unstable Housing in the Last Year: No               Past Surgical History:   Procedure Laterality Date    CATARACT EXTRACTION BILATERAL W/ ANTERIOR VITRECTOMY        DILATION AND CURETTAGE OF UTERUS         x2            Family History   Problem Relation Age of Onset    Thyroid disease Father      Thyroid disease Son      Thyroid disease Daughter      Diabetes Neg Hx      Breast cancer Neg Hx      Colon cancer Neg Hx      Ovarian cancer Neg Hx                 Review of Systems  General: negative for chills, fever or weight loss  Psychological: negative for mood changes or depression  Ophthalmic: negative for blurry vision, photophobia or eye pain  ENT: see  HPI  Respiratory: no cough, shortness of breath, or wheezing  Cardiovascular: no chest pain or dyspnea on exertion  Gastrointestinal: no abdominal pain, change in bowel habits, or black/ bloody stools  Musculoskeletal: negative for gait disturbance or muscular weakness  Neurological: no syncope or seizures; no ataxia  Dermatological: negative for pruritis,  rash and jaundice  Hematologic/lymphatic: no easy bruising, no new adenopathy        Physical Exam:     Vitals:    02/08/23 1017   BP: 104/62   Pulse: 81          Constitutional: Well appearing / communicating without difficulty, NAD.  Eyes: EOM I Bilaterally  Head/Face: Normocephalic.  Negative paranasal sinus pressure/tenderness.  Salivary glands WNL.  House Brackmann I Bilaterally.     Right Ear: Auricle normal appearance. External Auditory Canal within normal limits no lesions or masses,TM w/o masses/lesions/perforations. TM mobility noted.   Left Ear: Auricle normal appearance. External Auditory Canal within normal limits no lesions or masses,TM w/o masses/lesions/perforations. TM mobility noted.  Nose: No gross nasal septal deviation. Inferior Turbinates 3+ bilaterally. No septal perforation. No masses/lesions. External nasal skin appears normal without masses/lesions.  Oral Cavity: Gingiva/lips within normal limits.  Dentition/gingiva healthy appearing. Mucus membranes moist. Floor of mouth soft, no masses palpated. Oral Tongue mobile. Hard Palate appears normal.    Oropharynx: Base of tongue appears normal. No masses/lesions noted. Tonsillar fossa/pharyngeal wall without lesions. Posterior oropharynx WNL.  Soft palate without masses. Midline uvula.   Neck/Lymphatic: No LAD I-VI bilaterally.  No thyromegaly.  No masses noted on exam.     Ear Cerumen Removal    Date/Time: 2/8/2023 10:20 AM  Performed by: Carly Onofre MD  Authorized by: Carly Onofre MD     Consent Done?:  Yes (Verbal)    Local anesthetic:  None  Location details:  Both ears  Procedure  type: curette    Procedure type comment:  Suction  Cerumen  Removal Results:  Cerumen completely removed  Patient tolerance:  Patient tolerated the procedure well with no immediate complications     Dry skin and skin irritation bilateral ear canals, consistent with chronic otitis externa.      Ears:    Ears:    Perforation well healed, no otorrhea or middle ear mucosal disease noted         Audiogram:  Interpreted by me and reviewed with the patient today.      Bilateral sensorineural hearing loss, slightly worse overall since last visit.  Still with conductive component on right, but unchanged from previous.  SD worse slightly than last audio.  Flat tymp right, Type A tymp left.                 Assessment:  Chloe was seen today for hearing loss.    Diagnoses and all orders for this visit:    Mixed conductive and sensorineural hearing loss of right ear with restricted hearing of left ear    Otorrhea of right ear    Perforation of tympanic membrane, traumatic, right, sequela    Impacted cerumen of right ear    Sensorineural hearing loss (SNHL) of both ears    Chronic otitis externa of both ears, unspecified type    Other orders  -     Ear Cerumen Removal             Plan:      Patient will plan forhearing aid adjustment appointment, now that cerumen impaction relieved.    Use eardrops p.r.n. for otorrhea.    Follow-up in 1 year for annual audiogram and ear check, or sooner if needed.       Carly Onofre MD

## 2023-03-07 ENCOUNTER — CLINICAL SUPPORT (OUTPATIENT)
Dept: OTOLARYNGOLOGY | Facility: CLINIC | Age: 88
End: 2023-03-07
Payer: MEDICARE

## 2023-03-07 DIAGNOSIS — H90.A31 MIXED CONDUCTIVE AND SENSORINEURAL HEARING LOSS OF RIGHT EAR WITH RESTRICTED HEARING OF LEFT EAR: Primary | ICD-10-CM

## 2023-03-07 PROCEDURE — 99499 UNLISTED E&M SERVICE: CPT | Mod: S$PBB,,, | Performed by: AUDIOLOGIST

## 2023-03-07 PROCEDURE — 99499 NO LOS: ICD-10-PCS | Mod: S$PBB,,, | Performed by: AUDIOLOGIST

## 2023-03-07 NOTE — PROGRESS NOTES
Chloe Foster was seen today in the clinic for a hearing aid check and adjustment.  She reported not hearing as well with the hearing aids and had a recent audiogram that showed a change in her hearing.  I cleaned both hearing aids and changed the wax guards and domes.  The listening check revealed good sound quality.  I reprogrammed both hearing aids to the current audiogram.  She reported the hearing aids were a little loud so I decreased the overall gain and she reported good sound quality.  She will contact me with any issues/concerns and follow up as needed or annually.     Right ear:  Serial number:[id number]  : [employer]                                      Model: Audeo   Type: V-90 312T  Color: Nissa       Battery size: 312  Tube length: 1  Speaker power: S  Dome size and style: Large open  Warranty expiration: 6/2/2018  L and D expiration: 6/2/2017     Left ear:  Serial number: [id number]  :  Model:  Type:  Color:  Battery size:  Tube length:  Speaker power:  Dome size and style:  Warranty expiration:  L and D expiration:

## 2023-04-04 ENCOUNTER — PATIENT MESSAGE (OUTPATIENT)
Dept: OTOLARYNGOLOGY | Facility: CLINIC | Age: 88
End: 2023-04-04
Payer: MEDICARE

## 2023-04-05 ENCOUNTER — TELEPHONE (OUTPATIENT)
Dept: OTOLARYNGOLOGY | Facility: CLINIC | Age: 88
End: 2023-04-05
Payer: MEDICARE

## 2023-04-05 NOTE — TELEPHONE ENCOUNTER
Hearing Aid F/U Call:  I called Ms. Foster's daugher, Ms. Chloe Billy to let her know I would be in the office all day tomorrow if they wanted to come and  her mother's hearing aid.  Ms. Billy was upset that no one called her to let her know the hearing aid was ready for  today.  Ms. Latricia Owens, audiologist, confirmed she did leave Ms. Billy a message that the hearing aid was ready.  I apologized for the mis-communication.  Ms. Billy was appreciative of the phone call.

## 2023-04-05 NOTE — TELEPHONE ENCOUNTER
Returned call. Informed Chloe that the hearing aids were ready for pickup. She explained how frustrated she was that no one had returned her call or answered her message. I apologized and verbalized understanding. Informed that hearing aids are at the  and that we are open Thursday from 8-5PM. Ms. Corona thanked me and verbalized understanding.     ----- Message from Maggie Martinez sent at 4/5/2023  1:43 PM CDT -----  Regarding: repair  Contact: [phone]/lianne Corona  Patient's daughter Chloe is requesting a call back about the hearing aid. Is it repaired?   Would the patient rather a call back or a response via [facility]?  Call   Best Call Back Number:  [phone]/lianne Corona  Additional Information:

## 2023-04-10 ENCOUNTER — OFFICE VISIT (OUTPATIENT)
Dept: FAMILY MEDICINE | Facility: CLINIC | Age: 88
End: 2023-04-10
Payer: MEDICARE

## 2023-04-10 VITALS
DIASTOLIC BLOOD PRESSURE: 60 MMHG | WEIGHT: 161.38 LBS | HEIGHT: 69 IN | OXYGEN SATURATION: 96 % | SYSTOLIC BLOOD PRESSURE: 110 MMHG | HEART RATE: 100 BPM | BODY MASS INDEX: 23.9 KG/M2

## 2023-04-10 DIAGNOSIS — J30.89 SEASONAL ALLERGIC RHINITIS DUE TO OTHER ALLERGIC TRIGGER: Primary | ICD-10-CM

## 2023-04-10 DIAGNOSIS — E03.9 ACQUIRED HYPOTHYROIDISM: ICD-10-CM

## 2023-04-10 DIAGNOSIS — N18.31 STAGE 3A CHRONIC KIDNEY DISEASE: ICD-10-CM

## 2023-04-10 DIAGNOSIS — F02.80 LATE ONSET ALZHEIMER'S DEMENTIA WITHOUT BEHAVIORAL DISTURBANCE: ICD-10-CM

## 2023-04-10 DIAGNOSIS — G30.1 LATE ONSET ALZHEIMER'S DEMENTIA WITHOUT BEHAVIORAL DISTURBANCE: ICD-10-CM

## 2023-04-10 PROBLEM — J30.2 SEASONAL ALLERGIC RHINITIS: Status: ACTIVE | Noted: 2023-04-10

## 2023-04-10 PROCEDURE — 99214 OFFICE O/P EST MOD 30 MIN: CPT | Mod: S$PBB,,, | Performed by: STUDENT IN AN ORGANIZED HEALTH CARE EDUCATION/TRAINING PROGRAM

## 2023-04-10 PROCEDURE — 99999 PR PBB SHADOW E&M-EST. PATIENT-LVL IV: CPT | Mod: PBBFAC,,, | Performed by: STUDENT IN AN ORGANIZED HEALTH CARE EDUCATION/TRAINING PROGRAM

## 2023-04-10 PROCEDURE — 99214 OFFICE O/P EST MOD 30 MIN: CPT | Mod: PBBFAC,PN | Performed by: STUDENT IN AN ORGANIZED HEALTH CARE EDUCATION/TRAINING PROGRAM

## 2023-04-10 PROCEDURE — 99214 PR OFFICE/OUTPT VISIT, EST, LEVL IV, 30-39 MIN: ICD-10-PCS | Mod: S$PBB,,, | Performed by: STUDENT IN AN ORGANIZED HEALTH CARE EDUCATION/TRAINING PROGRAM

## 2023-04-10 PROCEDURE — 99999 PR PBB SHADOW E&M-EST. PATIENT-LVL IV: ICD-10-PCS | Mod: PBBFAC,,, | Performed by: STUDENT IN AN ORGANIZED HEALTH CARE EDUCATION/TRAINING PROGRAM

## 2023-04-10 RX ORDER — LEVOCETIRIZINE DIHYDROCHLORIDE 5 MG/1
5 TABLET, FILM COATED ORAL NIGHTLY
Qty: 90 TABLET | Refills: 3 | Status: SHIPPED | OUTPATIENT
Start: 2023-04-10 | End: 2023-10-10

## 2023-04-10 RX ORDER — FLUTICASONE PROPIONATE 50 MCG
1 SPRAY, SUSPENSION (ML) NASAL DAILY
Qty: 18.2 ML | Refills: 3 | Status: SHIPPED | OUTPATIENT
Start: 2023-04-10 | End: 2023-10-10

## 2023-04-10 NOTE — PROGRESS NOTES
Subjective:       Patient ID: Chloe Foster is a 89 y.o. female.    Chief Complaint: Follow-up (congested) and Nasal Congestion      Follow-up  Associated symptoms include fatigue. Pertinent negatives include no abdominal pain, arthralgias, coughing, fever, headaches, joint swelling, numbness, rash, vomiting or weakness.   Fatigue  Associated symptoms include fatigue. Pertinent negatives include no abdominal pain, arthralgias, coughing, fever, headaches, joint swelling, numbness, rash, vomiting or weakness.   Shortness of Breath  Pertinent negatives include no abdominal pain, fever, headaches, rash or vomiting.   Hand Pain   Pertinent negatives include no numbness.     89 yo pleasant Female with dementia (mildly dependent on family member(son) on ADLs, sHTN, HLD, hypothyroidism and MVP for f/u today. The son who is the primary care giver noticed she has been having some nasal congestion and mild cough in the past few days which has remarkably responded to mucinex. He endorses no fever, chills, CP, SOB, wheezing,or sinus pain or sore-throat. She has good appetite, moves her bowel regularly and still ambulates at her own pace around the house. She described her mood to be great as well today.     ROS: As per HPI    Past Medical History:   Diagnosis Date    Anxiety     Black eye 5/6/2013    Dementia without behavioral disturbance 4/15/2021    Dyslipidemia     Essential hypertension 10/10/2022    Hyperthyroidism     IGT (impaired glucose tolerance)     Mitral valve prolapse     Nontoxic multinodular goiter 2/18/2013    Primary hypothyroidism 5/18/2015    Shingles     left side of chest and back    Thyroid nodule 2/18/2013    Urinary tract infection        Past Surgical History:   Procedure Laterality Date    CATARACT EXTRACTION BILATERAL W/ ANTERIOR VITRECTOMY      DILATION AND CURETTAGE OF UTERUS      x2       Family History   Problem Relation Age of Onset    Thyroid disease Father     Thyroid disease Son      Thyroid disease Daughter     Diabetes Neg Hx     Breast cancer Neg Hx     Colon cancer Neg Hx     Ovarian cancer Neg Hx        Social History     Socioeconomic History    Marital status:    Occupational History    Occupation: Retired   Tobacco Use    Smoking status: Never    Smokeless tobacco: Never   Substance and Sexual Activity    Alcohol use: No     Alcohol/week: 0.0 standard drinks    Drug use: No    Sexual activity: Never   Social History Narrative    Her  passed away in March 2014. She lives with her  and has sitters and a son who helps. She has four children two of whom are twins. Her son is running the family business. Her children are all educated. She worked doing  for the [employer]. She has an education degree and is a retired . She started a travel agency for 15 years. She is a non-smoker and denies alcohol or substance abuse.                      Social Determinants of Health     Financial Resource Strain: Low Risk     Difficulty of Paying Living Expenses: Not hard at all   Food Insecurity: No Food Insecurity    Worried About Running Out of Food in the Last Year: Never true    Ran Out of Food in the Last Year: Never true   Transportation Needs: No Transportation Needs    Lack of Transportation (Medical): No    Lack of Transportation (Non-Medical): No   Physical Activity: Unknown    Days of Exercise per Week: 0 days   Stress: Unknown    Feeling of Stress : Patient refused   Social Connections: Unknown    Frequency of Communication with Friends and Family: More than three times a week    Frequency of Social Gatherings with Friends and Family: More than three times a week    Active Member of Clubs or Organizations: No    Attends Club or Organization Meetings: Patient refused    Marital Status:    Housing Stability: Low Risk     Unable to Pay for Housing in the Last Year: No    Number of Places Lived in the Last Year: 1    Unstable Housing in the  "Last Year: No         Objective:     Vitals:    04/10/23 1158   BP: 110/60   BP Location: Left arm   Patient Position: Sitting   BP Method: Large (Manual)   Pulse: 100   SpO2: 96%   Weight: 73.2 kg (161 lb 6 oz)   Height: 5' 9" (1.753 m)          Physical Exam  Vitals reviewed.   Constitutional:       Appearance: Normal appearance. She is normal weight.   HENT:      Mouth/Throat:      Mouth: Mucous membranes are moist.      Pharynx: Oropharynx is clear.   Eyes:      Extraocular Movements: Extraocular movements intact.      Conjunctiva/sclera: Conjunctivae normal.      Pupils: Pupils are equal, round, and reactive to light.   Cardiovascular:      Rate and Rhythm: Normal rate and regular rhythm.      Heart sounds: Murmur: opening snap.   Pulmonary:      Effort: Pulmonary effort is normal.      Breath sounds: Normal breath sounds. No stridor. No wheezing or rales.   Abdominal:      General: Abdomen is flat. Bowel sounds are normal.      Palpations: Abdomen is soft.   Musculoskeletal:      Right lower leg: No edema.      Left lower leg: No edema.   Skin:     General: Skin is warm.      Coloration: Skin is not jaundiced.      Findings: No bruising.   Neurological:      General: No focal deficit present.      Mental Status: She is alert.      Gait: Gait normal.         Laboratory:  CBC:  No results for input(s): WBC, RBC, HGB, HCT, PLT, MCV, MCH, MCHC in the last 2160 hours.    CMP:  No results for input(s): GLU, CALCIUM, ALBUMIN, PROT, NA, K, CO2, CL, BUN, ALKPHOS, ALT, AST, BILITOT in the last 2160 hours.    Invalid input(s): CREATININ    URINALYSIS:  No results for input(s): COLORU, CLARITYU, SPECGRAV, PHUR, PROTEINUA, GLUCOSEU, BILIRUBINCON, BLOODU, WBCU, RBCU, BACTERIA, MUCUS, NITRITE, LEUKOCYTESUR, UROBILINOGEN, HYALINECASTS in the last 2160 hours.   LIPIDS:  No results for input(s): TSH, HDL, CHOL, TRIG, LDLCALC, CHOLHDL, NONHDLCHOL, TOTALCHOLEST in the last 2160 hours.    TSH:  No results for input(s): TSH in the " last 2160 hours.    A1C:  No results for input(s): HGBA1C in the last 2160 hours.    Assessment:         ICD-10-CM ICD-9-CM   1. Seasonal allergic rhinitis due to other allergic trigger  J30.89 477.8   2. Acquired hypothyroidism  E03.9 244.9   3. Late onset Alzheimer's dementia without behavioral disturbance  G30.1 331.0    F02.80 294.10   4. Stage 3a chronic kidney disease  N18.31 585.3       Plan:       Allergic Rhinitis  -start flonase BID X2weeks, then daily  -xyzal 5mg nightly    Late onset Alzheimer's dementia without behavioral disturbance  -except intermittent bouts of anxiety which is easily corrected with redirection  -watchful waiting for now  -c/w zoloft    Hypothyroidism (acquired)  - TSH WNL  -c/w 75mcg for now    CKD stage 3a  -stable renal function  -avoid nephrotoxins      Patient's Medications   New Prescriptions    FLUTICASONE PROPIONATE (FLONASE) 50 MCG/ACTUATION NASAL SPRAY    1 spray (50 mcg total) by Each Nostril route once daily.    LEVOCETIRIZINE (XYZAL) 5 MG TABLET    Take 1 tablet (5 mg total) by mouth every evening.   Previous Medications    ANTIOX#10/OM3/DHA/EPA/LUT/ZEAX (I-CAPS ORAL)    Take 2 capsules by mouth once daily.    CALCIUM-VITAMIN D (OSCAL) 250 (625)-125 MG-UNIT PER TABLET    Take 1 tablet by mouth 2 (two) times daily.    DICLOFENAC SODIUM (VOLTAREN) 1 % GEL    Apply 2 g topically 2 (two) times daily.    DONEPEZIL (ARICEPT) 5 MG TABLET    Take 1 tablet (5 mg total) by mouth once daily.    FLUOCINOLONE ACETONIDE OIL (DERMOTIC OIL) 0.01 % DROP    Place 3 drops in ear(s) 2 (two) times daily.    LEVOTHYROXINE (SYNTHROID) 75 MCG TABLET    Take 1 tablet (75 mcg total) by mouth before breakfast.    MULTIVIT-IRON-FA-CALCIUM-MINS 18 MG IRON-400 MCG-500 MG CA TAB    Take 1 tablet by mouth once daily.    PREDNISOLONE ACETATE (PRED FORTE) 1 % DRPS    Apply 2 drops  to affected ear BID    SERTRALINE (ZOLOFT) 100 MG TABLET    Take 1 tablet (100 mg total) by mouth once daily.     TRIAMCINOLONE ACETONIDE 0.1% (KENALOG) 0.1 % OINTMENT    Apply topically 2 (two) times daily. To external ears for 7 days   Modified Medications    No medications on file   Discontinued Medications    OFLOXACIN (OCUFLOX) 0.3 % OPHTHALMIC SOLUTION    Apply 2-3 drops in right ear twice daily for 5-7 days       Patient and son were given opportunity to express concerns, ask questions and verbalizes understanding of current management plan.    Dr Denise Naqvi MD

## 2023-04-24 ENCOUNTER — PATIENT MESSAGE (OUTPATIENT)
Dept: OTOLARYNGOLOGY | Facility: CLINIC | Age: 88
End: 2023-04-24
Payer: MEDICARE

## 2023-04-26 ENCOUNTER — PATIENT MESSAGE (OUTPATIENT)
Dept: OTOLARYNGOLOGY | Facility: CLINIC | Age: 88
End: 2023-04-26
Payer: MEDICARE

## 2023-04-27 NOTE — PROGRESS NOTES
HEARING AID FOLLOW-UP/ CONSULTATION    Chloe Foster was seen today for a hearing aid follow-up and to order new hearing aids.  Mrs. Foster is accompanied by her daughter, Chloe. Her daughter expressed concern about Mrs. Foster's dexterity and having difficulty adjusting the volume via the hearing aid buttons.     Mrs. Foster was recommended the entry level technology at $1,300/ear. She selected the color champagne and Phonak L30-R. She also would like to order a remote. The remote was quoted at $146.  The purchase agreement, 30-day trial period, and warranties were reviewed with patient and her daughter.    I will call Mrs. Foster's son, Justin, upon receiving her order from the .    Current Hearing Aid Details    Right Serial number: [id number]  Left Serial number: [id number]  : Phonak                                      Model: Audeo   Type: V-90 312T  Color: Nissa       Battery size: 312  Tube length: 1  Speaker power: S  Dome size and style: Large open  Warranty expiration: 6/2/2018  L and D expiration: 6/2/2017

## 2023-04-28 ENCOUNTER — CLINICAL SUPPORT (OUTPATIENT)
Dept: AUDIOLOGY | Facility: CLINIC | Age: 88
End: 2023-04-28
Payer: MEDICARE

## 2023-04-28 DIAGNOSIS — H90.3 SENSORINEURAL HEARING LOSS, BILATERAL: Primary | ICD-10-CM

## 2023-05-01 ENCOUNTER — OFFICE VISIT (OUTPATIENT)
Dept: OTOLARYNGOLOGY | Facility: CLINIC | Age: 88
End: 2023-05-01
Payer: MEDICARE

## 2023-05-01 VITALS
SYSTOLIC BLOOD PRESSURE: 102 MMHG | DIASTOLIC BLOOD PRESSURE: 60 MMHG | BODY MASS INDEX: 23.9 KG/M2 | WEIGHT: 161.38 LBS | HEIGHT: 69 IN

## 2023-05-01 DIAGNOSIS — H92.11 OTORRHEA OF RIGHT EAR: ICD-10-CM

## 2023-05-01 DIAGNOSIS — H61.23 BILATERAL IMPACTED CERUMEN: ICD-10-CM

## 2023-05-01 DIAGNOSIS — S09.21XS: ICD-10-CM

## 2023-05-01 DIAGNOSIS — H90.3 SENSORINEURAL HEARING LOSS (SNHL) OF BOTH EARS: ICD-10-CM

## 2023-05-01 DIAGNOSIS — H90.A31 MIXED CONDUCTIVE AND SENSORINEURAL HEARING LOSS OF RIGHT EAR WITH RESTRICTED HEARING OF LEFT EAR: ICD-10-CM

## 2023-05-01 DIAGNOSIS — H60.391 OTHER INFECTIVE OTITIS EXTERNA OF RIGHT EAR, UNSPECIFIED CHRONICITY: Primary | ICD-10-CM

## 2023-05-01 PROCEDURE — 99999 PR PBB SHADOW E&M-EST. PATIENT-LVL III: CPT | Mod: PBBFAC,,, | Performed by: NURSE PRACTITIONER

## 2023-05-01 PROCEDURE — 99999 PR PBB SHADOW E&M-EST. PATIENT-LVL III: ICD-10-PCS | Mod: PBBFAC,,, | Performed by: NURSE PRACTITIONER

## 2023-05-01 PROCEDURE — 69210 EAR CERUMEN REMOVAL: ICD-10-PCS | Mod: S$PBB,,, | Performed by: NURSE PRACTITIONER

## 2023-05-01 PROCEDURE — 99213 OFFICE O/P EST LOW 20 MIN: CPT | Mod: PBBFAC,PO | Performed by: NURSE PRACTITIONER

## 2023-05-01 PROCEDURE — 99214 PR OFFICE/OUTPT VISIT, EST, LEVL IV, 30-39 MIN: ICD-10-PCS | Mod: S$PBB,25,, | Performed by: NURSE PRACTITIONER

## 2023-05-01 PROCEDURE — 69210 REMOVE IMPACTED EAR WAX UNI: CPT | Mod: S$PBB,,, | Performed by: NURSE PRACTITIONER

## 2023-05-01 PROCEDURE — 69210 REMOVE IMPACTED EAR WAX UNI: CPT | Mod: 50,PBBFAC,PO | Performed by: NURSE PRACTITIONER

## 2023-05-01 PROCEDURE — 99214 OFFICE O/P EST MOD 30 MIN: CPT | Mod: S$PBB,25,, | Performed by: NURSE PRACTITIONER

## 2023-05-01 PROCEDURE — 87070 CULTURE OTHR SPECIMN AEROBIC: CPT | Performed by: NURSE PRACTITIONER

## 2023-05-01 RX ORDER — CLOTRIMAZOLE 1 G/ML
SOLUTION TOPICAL
Qty: 10 ML | Refills: 0 | Status: SHIPPED | OUTPATIENT
Start: 2023-05-01 | End: 2023-10-10

## 2023-05-01 RX ORDER — OFLOXACIN 3 MG/ML
SOLUTION AURICULAR (OTIC)
COMMUNITY
Start: 2023-04-25 | End: 2023-10-10

## 2023-05-01 NOTE — PROCEDURES
Ear Cerumen Removal    Date/Time: 5/1/2023 3:40 PM  Performed by: Martina Knight NP  Authorized by: Martina Knight NP     Consent Done?:  Yes (Verbal)  Location details:  Both ears  Procedure type: curette    Procedure type comment:  Suction  Cerumen  Removal Results:  Cerumen completely removed  Patient tolerance:  Patient tolerated the procedure well with no immediate complications

## 2023-05-01 NOTE — PROGRESS NOTES
Chief Complaint   Patient presents with    Otitis Media     Dry skin in both of them, believes it is an infection   .     HPI: Chloe Foster is a 89 y.o. female who is referred to me by Dr. Denise Guzman* in consultation for possible ear infection. Son notices patient picking at her ears. Patient denies pain and itchiness. Chloe has had approximately numerous episodes of otitis externa in the past several years. The infections typically affect the right ear and are typically manifested by ear drainage.  The last ear infection was 2 months ago.  Her nasal symptoms consist of clear rhinorrhea.      She was started on ofloxacin and Pred Forte drops on 4/26/2023 for right ear drainage. She went for hearing aid fitting on 4/28/2023 and audiologist noted drainage in her right ear.       Past Medical History:   Diagnosis Date    Anxiety     Black eye 5/6/2013    Dementia without behavioral disturbance 4/15/2021    Dyslipidemia     Essential hypertension 10/10/2022    Hyperthyroidism     IGT (impaired glucose tolerance)     Mitral valve prolapse     Nontoxic multinodular goiter 2/18/2013    Primary hypothyroidism 5/18/2015    Shingles     left side of chest and back    Thyroid nodule 2/18/2013    Urinary tract infection      Social History     Socioeconomic History    Marital status:    Occupational History    Occupation: Retired   Tobacco Use    Smoking status: Never    Smokeless tobacco: Never   Substance and Sexual Activity    Alcohol use: No     Alcohol/week: 0.0 standard drinks    Drug use: No    Sexual activity: Never   Social History Narrative    Her  passed away in March 2014. She lives with her  and has sitters and a son who helps. She has four children two of whom are twins. Her son is running the family business. Her children are all educated. 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 She has an education degree and is a retired . She started a travel  agency for 15 years. She is a non-smoker and denies alcohol or substance abuse.                      Social Determinants of Health     Financial Resource Strain: Low Risk     Difficulty of Paying Living Expenses: Not hard at all   Food Insecurity: No Food Insecurity    Worried About Running Out of Food in the Last Year: Never true    Ran Out of Food in the Last Year: Never true   Transportation Needs: No Transportation Needs    Lack of Transportation (Medical): No    Lack of Transportation (Non-Medical): No   Physical Activity: Unknown    Days of Exercise per Week: 0 days   Stress: Unknown    Feeling of Stress : Patient refused   Social Connections: Unknown    Frequency of Communication with Friends and Family: More than three times a week    Frequency of Social Gatherings with Friends and Family: More than three times a week    Active Member of Clubs or Organizations: No    Attends Club or Organization Meetings: Patient refused    Marital Status:    Housing Stability: Low Risk     Unable to Pay for Housing in the Last Year: No    Number of Places Lived in the Last Year: 1    Unstable Housing in the Last Year: No     Past Surgical History:   Procedure Laterality Date    CATARACT EXTRACTION BILATERAL W/ ANTERIOR VITRECTOMY      DILATION AND CURETTAGE OF UTERUS      x2     Family History   Problem Relation Age of Onset    Thyroid disease Father     Thyroid disease Son     Thyroid disease Daughter     Diabetes Neg Hx     Breast cancer Neg Hx     Colon cancer Neg Hx     Ovarian cancer Neg Hx            Review of Systems  General: negative for chills, fever or weight loss  Psychological: negative for mood changes or depression  Ophthalmic: negative for blurry vision, photophobia or eye pain  ENT: see HPI  Respiratory: no cough, shortness of breath, or wheezing  Cardiovascular: no chest pain or dyspnea on exertion  Gastrointestinal: no abdominal pain, change in bowel habits, or black/ bloody  stools  Musculoskeletal: negative for gait disturbance or muscular weakness  Neurological: no syncope or seizures; no ataxia  Dermatological: negative for pruritis,  rash and jaundice  Hemotologic/Lymphatic: no new adenopathy, no easy bruising or bleeding      Physical Exam:    Vitals:    05/01/23 1541   BP: 102/60       Constitutional: Well appearing / communicating without difficutly.  NAD.  Eyes: EOM I Bilaterally  Head/Face: Normocephalic.  Negative paranasal sinus pressure/tenderness.  Salivary glands WNL.  House Brackmann I Bilaterally.    Right Ear: Auricle with flaky skin and erythema.  External Auditory Canal with cerumen impaction and purulent drainage. EAC within normal limits no lesions or masses,TM with perforation (25%).     Ears:    Left Ear: Auricle with flaky skin and erythema.  External Auditory Canal with cerumen impaction. EAC within normal limits no lesions or masses,TM w/o masses/lesions/perforations. TM mobility noted.  Nose: No gross nasal septal deviation. Inferior Turbinates 3+ bilaterally. No septal perforation. No masses/lesions. External nasal skin appears normal without masses/lesions.  Oral Cavity: Gingiva/lips within normal limits.  Dentition/gingiva healthy appearing. Mucus membranes moist. Floor of mouth soft, no masses palpated. Oral Tongue mobile. Hard Palate appears normal.    Oropharynx: Base of tongue appears normal. No masses/lesions noted. Tonsillar fossa/pharyngeal wall without lesions. Posterior oropharynx WNL.  Soft palate without masses. Midline uvula.   Neck/Lymphatic: No LAD I-VI bilaterally.  No thyromegaly.  No masses noted on exam.    Mirror laryngoscopy/nasopharyngoscopy: Active gag reflex.  Unable to perform.    Neuro/Psychiatric: AOx3.  Normal mood and affect.   Cardiovascular: Normal carotid pulses bilaterally, no increasing jugular venous distention noted at cervical region bilaterally.    Respiratory: Normal respiratory effort, no stridor, no retractions  noted.        Ear Cerumen Removal     Date/Time: 5/1/2023 3:40 PM  Performed by: Martina Knight NP  Authorized by: Martina Knight NP      Consent Done?:  Yes (Verbal)  Location details:  Both ears  Procedure type: curette    Procedure type comment:  Suction  Cerumen  Removal Results:  Cerumen completely removed  Patient tolerance:  Patient tolerated the procedure well with no immediate complications      Assessment:    ICD-10-CM ICD-9-CM    1. Other infective otitis externa of right ear, unspecified chronicity  H60.391 380.10 Aerobic culture      2. Bilateral impacted cerumen  H61.23 380.4 Ear Cerumen Removal      3. Mixed conductive and sensorineural hearing loss of right ear with restricted hearing of left ear  H90.A31 389.22       4. Otorrhea of right ear  H92.11 388.60       5. Perforation of tympanic membrane, traumatic, right, sequela  S09.21XS 906.0      E989       6. Sensorineural hearing loss (SNHL) of both ears  H90.3 389.18         The primary encounter diagnosis was Other infective otitis externa of right ear, unspecified chronicity. Diagnoses of Bilateral impacted cerumen, Mixed conductive and sensorineural hearing loss of right ear with restricted hearing of left ear, Otorrhea of right ear, Perforation of tympanic membrane, traumatic, right, sequela, and Sensorineural hearing loss (SNHL) of both ears were also pertinent to this visit.      Plan:  Orders Placed This Encounter   Procedures    Ear Cerumen Removal    Aerobic culture     -start on clotrimazole apply to right ear bid x 14 days  -obtained right ear culture  -continue pred Forte and ofloxacin.   - f/u 2 weeks    The patient has otitis externa of the the right ear today.  I would recommend that we start the patient on clotrimazole ear drops.  An oral antibiotic will not be prescribed because the patient does not have risk factors requiring systemic therapy. The patient has been instructed to keep the affected ear dry and will use a  hairdryer to dry any moisture in the ear. Return to clinic in two weeks, sooner if needed.         Martina Knight, NP

## 2023-05-02 ENCOUNTER — PATIENT MESSAGE (OUTPATIENT)
Dept: OTOLARYNGOLOGY | Facility: CLINIC | Age: 88
End: 2023-05-02
Payer: MEDICARE

## 2023-05-03 ENCOUNTER — DOCUMENTATION ONLY (OUTPATIENT)
Dept: AUDIOLOGY | Facility: CLINIC | Age: 88
End: 2023-05-03
Payer: MEDICARE

## 2023-05-03 NOTE — PROGRESS NOTES
Hearing aids arrived from  and are in working condition. Patient's son was called to schedule her appt. She is scheduled for 5/9 at 2 pm.    Hearing Aid Details     & Model: Phonak Audeo L30-R  Color: Champagne  Rt SN: [id number]  Lt SN: [id number]  Battery: Flowdock-Ion   Rt : 1 MP  Lt : 1 Power  Repair Warranty Exp: 07/26/2026  L&D Warranty Exp: 07/26/2026   SN: [id number]    Remote Control  SN: [id number]  Warranty: 07/26/2024

## 2023-05-05 LAB — BACTERIA SPEC AEROBE CULT: ABNORMAL

## 2023-05-09 ENCOUNTER — CLINICAL SUPPORT (OUTPATIENT)
Dept: AUDIOLOGY | Facility: CLINIC | Age: 88
End: 2023-05-09
Payer: MEDICARE

## 2023-05-09 DIAGNOSIS — H90.6 MIXED HEARING LOSS, BILATERAL: Primary | ICD-10-CM

## 2023-05-09 NOTE — PROGRESS NOTES
HEARING AID FITTING    Chloe Foster was seen today for a hearing aid fitting.  Her son, Justin, accompanied her to the visit. All parts of the hearing aid, including battery, domes, wax filters, and microphones, were discussed with the patient.  Battery charging was also reviewed and practiced with the patient.  Feedback measures were taken and hearing aid was fit to patient's satisfaction.  Counseled patient on daily wear and maintenance of the hearing aid.  Mrs. Foster acknowledged that she understood.  The hearing aids were connected to the patient's son's phone.  Bluetooth settings were tested successfully in office.  The patient's accessories were paired to the hearing aids successfully and practiced/reviewed use in office.  The purchase agreement, 30-day trial period, and warranties were also reviewed with patient.  It is recommended Mrs. Foster return to clinic in 2 weeks or as needed.      Hearing Aid Details     & Model: Phonak Audeo L30-R  Color: Champagne  Rt SN: [id number]  Lt SN: [id number]  Battery: Li-Ion   Rt : 1 MP  Lt : 2 Power  Domes: medium power domes  Repair Warranty Exp: 07/26/2026  L&D Warranty Exp: 07/26/2026   SN: [id number]     Remote Control  SN: [id number]  Warranty: 07/26/2024

## 2023-05-10 ENCOUNTER — PATIENT MESSAGE (OUTPATIENT)
Dept: AUDIOLOGY | Facility: CLINIC | Age: 88
End: 2023-05-10
Payer: MEDICARE

## 2023-05-17 ENCOUNTER — TELEPHONE (OUTPATIENT)
Dept: OTOLARYNGOLOGY | Facility: CLINIC | Age: 88
End: 2023-05-17
Payer: MEDICARE

## 2023-05-17 ENCOUNTER — OFFICE VISIT (OUTPATIENT)
Dept: OTOLARYNGOLOGY | Facility: CLINIC | Age: 88
End: 2023-05-17
Payer: MEDICARE

## 2023-05-17 VITALS
HEIGHT: 69 IN | BODY MASS INDEX: 23.35 KG/M2 | WEIGHT: 157.63 LBS | SYSTOLIC BLOOD PRESSURE: 92 MMHG | DIASTOLIC BLOOD PRESSURE: 48 MMHG

## 2023-05-17 DIAGNOSIS — H60.391 OTHER INFECTIVE OTITIS EXTERNA OF RIGHT EAR, UNSPECIFIED CHRONICITY: Primary | ICD-10-CM

## 2023-05-17 DIAGNOSIS — S09.21XS: ICD-10-CM

## 2023-05-17 PROCEDURE — 99213 OFFICE O/P EST LOW 20 MIN: CPT | Mod: S$PBB,,, | Performed by: NURSE PRACTITIONER

## 2023-05-17 PROCEDURE — 99213 PR OFFICE/OUTPT VISIT, EST, LEVL III, 20-29 MIN: ICD-10-PCS | Mod: S$PBB,,, | Performed by: NURSE PRACTITIONER

## 2023-05-17 PROCEDURE — 99999 PR PBB SHADOW E&M-EST. PATIENT-LVL III: CPT | Mod: PBBFAC,,, | Performed by: NURSE PRACTITIONER

## 2023-05-17 PROCEDURE — 99213 OFFICE O/P EST LOW 20 MIN: CPT | Mod: PBBFAC,PO | Performed by: NURSE PRACTITIONER

## 2023-05-17 PROCEDURE — 99999 PR PBB SHADOW E&M-EST. PATIENT-LVL III: ICD-10-PCS | Mod: PBBFAC,,, | Performed by: NURSE PRACTITIONER

## 2023-05-17 NOTE — TELEPHONE ENCOUNTER
I called Ms. Foster's daughter to confirm her appt time today in the ENT clinic in [place].  Ms. Billy said they will be at the clinic close to 10:45am.  She was appreciative of the phone call.    ----- Message from Nicolasa Castillo sent at 5/17/2023  8:29 AM CDT -----  Type:  Patient Returning Call    Who Called:pt daughter  Who Left Message for Patient:office  Does the patient know what this is regarding?:appointment time change   Would the patient rather a call back or a response via [facility]? call  Best Call Back Number:[phone]  Additional Information:

## 2023-05-17 NOTE — PROGRESS NOTES
Chief Complaint   Patient presents with    Otitis Media   .     HPI: Chloe Foster is a 89 y.o. female who is referred to me by Dr. Denise Guzman* in consultation for possible ear infection. Son notices patient picking at her ears. Patient denies pain and itchiness. Chloe has had approximately numerous episodes of otitis externa in the past several years. The infections typically affect the right ear and are typically manifested by ear drainage.  The last ear infection was 2 months ago.  Her nasal symptoms consist of clear rhinorrhea.      She was started on ofloxacin and Pred Forte drops on 4/26/2023 for right ear drainage. She went for hearing aid fitting on 4/28/2023 and audiologist noted drainage in her right ear.     Interval HPI 5/17/2023:  Follow up visit. She is here with her daughter today. States she is doing well. Denies ear pain and drainage. She recently had new hearing aids and is hearing better. No complaints today. Ear culture on 5/1/2023 was positive for aspergillus flavus. She has completed clotrimazole otic drops x 14 days.       Past Medical History:   Diagnosis Date    Anxiety     Black eye 5/6/2013    Dementia without behavioral disturbance 4/15/2021    Dyslipidemia     Essential hypertension 10/10/2022    Hyperthyroidism     IGT (impaired glucose tolerance)     Mitral valve prolapse     Nontoxic multinodular goiter 2/18/2013    Primary hypothyroidism 5/18/2015    Shingles     left side of chest and back    Thyroid nodule 2/18/2013    Urinary tract infection      Social History     Socioeconomic History    Marital status:    Occupational History    Occupation: Retired   Tobacco Use    Smoking status: Never    Smokeless tobacco: Never   Substance and Sexual Activity    Alcohol use: No     Alcohol/week: 0.0 standard drinks    Drug use: No    Sexual activity: Never   Social History Narrative    Her  passed away in March 2014. She lives with her  and has sitters  and a son who helps. She has four children two of whom are twins. Her son is running the family business. Her children are all educated. She worked doing  for the State [employer]. She has an education degree and is a retired . She started a travel agency for 15 years. She is a non-smoker and denies alcohol or substance abuse.                      Social Determinants of Health     Financial Resource Strain: Low Risk     Difficulty of Paying Living Expenses: Not hard at all   Food Insecurity: No Food Insecurity    Worried About Running Out of Food in the Last Year: Never true    Ran Out of Food in the Last Year: Never true   Transportation Needs: No Transportation Needs    Lack of Transportation (Medical): No    Lack of Transportation (Non-Medical): No   Physical Activity: Unknown    Days of Exercise per Week: 0 days   Stress: Unknown    Feeling of Stress : Patient refused   Social Connections: Unknown    Frequency of Communication with Friends and Family: More than three times a week    Frequency of Social Gatherings with Friends and Family: More than three times a week    Active Member of Clubs or Organizations: No    Attends Club or Organization Meetings: Patient refused    Marital Status:    Housing Stability: Low Risk     Unable to Pay for Housing in the Last Year: No    Number of Places Lived in the Last Year: 1    Unstable Housing in the Last Year: No     Past Surgical History:   Procedure Laterality Date    CATARACT EXTRACTION BILATERAL W/ ANTERIOR VITRECTOMY      DILATION AND CURETTAGE OF UTERUS      x2     Family History   Problem Relation Age of Onset    Thyroid disease Father     Thyroid disease Son     Thyroid disease Daughter     Diabetes Neg Hx     Breast cancer Neg Hx     Colon cancer Neg Hx     Ovarian cancer Neg Hx            Review of Systems  General: negative for chills, fever or weight loss  Psychological: negative for mood changes or depression  Ophthalmic:  negative for blurry vision, photophobia or eye pain  ENT: see HPI  Respiratory: no cough, shortness of breath, or wheezing  Cardiovascular: no chest pain or dyspnea on exertion  Gastrointestinal: no abdominal pain, change in bowel habits, or black/ bloody stools  Musculoskeletal: negative for gait disturbance or muscular weakness  Neurological: no syncope or seizures; no ataxia  Dermatological: negative for pruritis,  rash and jaundice  Hemotologic/Lymphatic: no new adenopathy, no easy bruising or bleeding      Physical Exam:    Vitals:    05/17/23 1058   BP: (!) 92/48         Constitutional: Well appearing / communicating without difficutly.  NAD.  Eyes: EOM I Bilaterally  Head/Face: Normocephalic.  Negative paranasal sinus pressure/tenderness.  Salivary glands WNL.  House Brackmann I Bilaterally.    Right Ear: Auricle WNL.  EAC within normal limits no lesions or masses,TM with perforation (25%).     Ears:    Left Ear: Auricle WNL. EAC within normal limits no lesions or masses,TM w/o masses/lesions/perforations. TM mobility noted.  Nose: No gross nasal septal deviation. Inferior Turbinates 3+ bilaterally. No septal perforation. No masses/lesions. External nasal skin appears normal without masses/lesions.  Oral Cavity: Gingiva/lips within normal limits.  Dentition/gingiva healthy appearing. Mucus membranes moist. Floor of mouth soft, no masses palpated. Oral Tongue mobile. Hard Palate appears normal.    Oropharynx: Base of tongue appears normal. No masses/lesions noted. Tonsillar fossa/pharyngeal wall without lesions. Posterior oropharynx WNL.  Soft palate without masses. Midline uvula.   Neck/Lymphatic: No LAD I-VI bilaterally.  No thyromegaly.  No masses noted on exam.    Mirror laryngoscopy/nasopharyngoscopy: Active gag reflex.  Unable to perform.    Neuro/Psychiatric: AOx3.  Normal mood and affect.   Cardiovascular: Normal carotid pulses bilaterally, no increasing jugular venous distention noted at cervical  region bilaterally.    Respiratory: Normal respiratory effort, no stridor, no retractions noted.        Assessment:    ICD-10-CM ICD-9-CM    1. Other infective otitis externa of right ear, unspecified chronicity -resolved  H60.391 380.10       2. Perforation of tympanic membrane, traumatic, right, sequela  S09.21XS 906.0      E989           The primary encounter diagnosis was Other infective otitis externa of right ear, unspecified chronicity -resolved. A diagnosis of Perforation of tympanic membrane, traumatic, right, sequela was also pertinent to this visit.      Plan:  No orders of the defined types were placed in this encounter.    -EAC WNL with no signs of infection   - F/U 1 year or sooner as needed        Martina Knight NP        Answers submitted by the patient for this visit:  Review of Symptoms Questionnaire  (Submitted on 5/17/2023)  None of these: Yes  hearing loss: Yes  Ear infection(s)?: Yes  None of these : Yes  None of these: Yes  None of these : Yes  None of these: Yes  None of these: Yes  None of these: Yes  None of these : Yes  None of these: Yes  None of these : Yes  None of these: Yes  None of these: Yes  None of these: Yes

## 2023-06-09 ENCOUNTER — PES CALL (OUTPATIENT)
Dept: ADMINISTRATIVE | Facility: CLINIC | Age: 88
End: 2023-06-09
Payer: MEDICARE

## 2023-06-14 ENCOUNTER — PATIENT MESSAGE (OUTPATIENT)
Dept: AUDIOLOGY | Facility: CLINIC | Age: 88
End: 2023-06-14
Payer: MEDICARE

## 2023-06-16 ENCOUNTER — PATIENT MESSAGE (OUTPATIENT)
Dept: PODIATRY | Facility: CLINIC | Age: 88
End: 2023-06-16
Payer: MEDICARE

## 2023-06-26 ENCOUNTER — PATIENT MESSAGE (OUTPATIENT)
Dept: FAMILY MEDICINE | Facility: CLINIC | Age: 88
End: 2023-06-26
Payer: MEDICARE

## 2023-06-26 DIAGNOSIS — I10 ESSENTIAL HYPERTENSION: Primary | ICD-10-CM

## 2023-06-27 ENCOUNTER — CLINICAL SUPPORT (OUTPATIENT)
Dept: AUDIOLOGY | Facility: CLINIC | Age: 88
End: 2023-06-27
Payer: MEDICARE

## 2023-06-27 DIAGNOSIS — H90.6 MIXED HEARING LOSS, BILATERAL: Primary | ICD-10-CM

## 2023-06-27 PROCEDURE — 99499 UNLISTED E&M SERVICE: CPT | Mod: S$GLB,,, | Performed by: AUDIOLOGIST

## 2023-06-27 PROCEDURE — 99499 NO LOS: ICD-10-PCS | Mod: S$GLB,,, | Performed by: AUDIOLOGIST

## 2023-06-27 NOTE — PROGRESS NOTES
HEARING AID FOLLOW-UP    Mrs. Foster is here today to lengthen the wire in her left ear as it keeps pulling out of her ear.  was changed from 1 M to 2 M. Mrs. Foster is hearing well with her new hearing aids. Her son, Celestino, said she has not used the remote to lower the hearing aids so she could get used to the volume. Data logging revealed an average of 9.6 hours a day. Mrs. Foster declined any adjustments today. It is recommended that Mrs. Foster return to clinic as needed for hearing aid adjustments and once a year for an annual audiogram.    Hearing Aid Details      & Model: Phonak Audeo L30-R  Color: Champagne  Rt SN: [id number]  Lt SN: [id number]  Battery: Li-Ion   Rt : 2 P  Lt : 2 MP  Domes: medium power domes  Repair Warranty Exp: 07/26/2026  L&D Warranty Exp: 07/26/2026   SN: [id number]     Remote Control  SN: [id number]  Warranty: 07/26/2024

## 2023-07-03 ENCOUNTER — PATIENT MESSAGE (OUTPATIENT)
Dept: FAMILY MEDICINE | Facility: CLINIC | Age: 88
End: 2023-07-03
Payer: MEDICARE

## 2023-07-03 RX ORDER — AMLODIPINE BESYLATE 2.5 MG/1
2.5 TABLET ORAL DAILY
Qty: 90 TABLET | Refills: 0 | Status: SHIPPED | OUTPATIENT
Start: 2023-07-03 | End: 2023-10-10 | Stop reason: ALTCHOICE

## 2023-08-10 ENCOUNTER — TELEPHONE (OUTPATIENT)
Dept: FAMILY MEDICINE | Facility: CLINIC | Age: 88
End: 2023-08-10

## 2023-08-10 ENCOUNTER — E-VISIT (OUTPATIENT)
Dept: FAMILY MEDICINE | Facility: CLINIC | Age: 88
End: 2023-08-10
Payer: MEDICARE

## 2023-08-10 ENCOUNTER — PATIENT MESSAGE (OUTPATIENT)
Dept: FAMILY MEDICINE | Facility: CLINIC | Age: 88
End: 2023-08-10

## 2023-08-10 DIAGNOSIS — U07.1 COVID-19 VIRUS INFECTION: Primary | ICD-10-CM

## 2023-08-10 DIAGNOSIS — U07.1 COVID-19 VIRUS INFECTION: ICD-10-CM

## 2023-08-10 PROCEDURE — 99213 PR OFFICE/OUTPT VISIT, EST, LEVL III, 20-29 MIN: ICD-10-PCS | Mod: 25,95,, | Performed by: STUDENT IN AN ORGANIZED HEALTH CARE EDUCATION/TRAINING PROGRAM

## 2023-08-10 PROCEDURE — 99213 OFFICE O/P EST LOW 20 MIN: CPT | Mod: 25,95,, | Performed by: STUDENT IN AN ORGANIZED HEALTH CARE EDUCATION/TRAINING PROGRAM

## 2023-08-10 RX ORDER — NIRMATRELVIR AND RITONAVIR 300-100 MG
KIT ORAL
Qty: 30 TABLET | Refills: 0 | OUTPATIENT
Start: 2023-08-10

## 2023-08-10 RX ORDER — NIRMATRELVIR AND RITONAVIR 300-100 MG
KIT ORAL
Qty: 30 TABLET | Refills: 0 | Status: SHIPPED | OUTPATIENT
Start: 2023-08-10 | End: 2023-08-10

## 2023-08-10 RX ORDER — NIRMATRELVIR AND RITONAVIR 300-100 MG
KIT ORAL
Qty: 30 TABLET | Refills: 0 | Status: SHIPPED | OUTPATIENT
Start: 2023-08-10 | End: 2023-10-10

## 2023-08-10 NOTE — TELEPHONE ENCOUNTER
----- Message from Aspen Mtz sent at 8/10/2023 12:51 PM CDT -----  Contact: Chloe  Type:  Needs Medical Advice    Who Called: pt Daughter  Symptoms (please be specific): pt needs a lower dosage of the nirmatrelvir-ritonavir (PAXLOVID) 300 mg (150  due to her renal issues for Covid please call in as soon as possible--- more renal friendly lower dosage    Would the patient rather a call back or a response via [facility]? call  Best Call Back Number:[phone]  Additional Information: please call in as soon as possible pt it waiting to take tested positive for covid

## 2023-08-10 NOTE — TELEPHONE ENCOUNTER
----- Message from Cedric Bear sent at 8/10/2023  2:27 PM CDT -----  Type:  Patient Returning Call    Who Called:daughter   Does the patient know what this is regarding?:tested positive for covid and pharmacy has questions for staff  Caller stated no on has respond   Would the patient rather a call back or a response via [facility]? Call   Best Call Back Number:   Additional Information:   Please be advised caller stated she called 3 times today and she's worried

## 2023-08-10 NOTE — TELEPHONE ENCOUNTER
Spoke with pt son and daughter. Informed it is okay for pt to take medicine and tat  sees no issue in pt taking rx. Spoke with pharmacy and let them know to go ahead and fill; medication and that pt son and daughter made aware. Verbally understood

## 2023-08-10 NOTE — PROGRESS NOTES
Patient with cough, sore throat and Home COVID test positive, no worsening SOB     Plan : Send Paxlovid to pharmacy, patient informed  
30-Oct-2021

## 2023-08-11 ENCOUNTER — TELEPHONE (OUTPATIENT)
Dept: FAMILY MEDICINE | Facility: CLINIC | Age: 88
End: 2023-08-11
Payer: MEDICARE

## 2023-08-11 ENCOUNTER — PATIENT MESSAGE (OUTPATIENT)
Dept: FAMILY MEDICINE | Facility: CLINIC | Age: 88
End: 2023-08-11
Payer: MEDICARE

## 2023-08-30 DIAGNOSIS — M17.12 PRIMARY OSTEOARTHRITIS OF LEFT KNEE: Primary | ICD-10-CM

## 2023-08-31 ENCOUNTER — HOSPITAL ENCOUNTER (OUTPATIENT)
Dept: RADIOLOGY | Facility: HOSPITAL | Age: 88
Discharge: HOME | End: 2023-08-31
Attending: ORTHOPAEDIC SURGERY
Payer: MEDICARE

## 2023-08-31 ENCOUNTER — OFFICE VISIT (OUTPATIENT)
Dept: ORTHOPEDICS | Facility: CLINIC | Age: 88
End: 2023-08-31
Payer: MEDICARE

## 2023-08-31 DIAGNOSIS — M17.12 PRIMARY OSTEOARTHRITIS OF LEFT KNEE: ICD-10-CM

## 2023-08-31 DIAGNOSIS — M17.12 PRIMARY OSTEOARTHRITIS OF LEFT KNEE: Primary | ICD-10-CM

## 2023-08-31 PROCEDURE — 20610 LARGE JOINT ASPIRATION/INJECTION: L KNEE: ICD-10-PCS | Mod: S$PBB,LT,, | Performed by: ORTHOPAEDIC SURGERY

## 2023-08-31 PROCEDURE — 99999 PR PBB SHADOW E&M-EST. PATIENT-LVL II: ICD-10-PCS | Mod: PBBFAC,,, | Performed by: ORTHOPAEDIC SURGERY

## 2023-08-31 PROCEDURE — 20610 DRAIN/INJ JOINT/BURSA W/O US: CPT | Mod: PBBFAC,PN,LT | Performed by: ORTHOPAEDIC SURGERY

## 2023-08-31 PROCEDURE — 99213 PR OFFICE/OUTPT VISIT, EST, LEVL III, 20-29 MIN: ICD-10-PCS | Mod: S$PBB,25,, | Performed by: ORTHOPAEDIC SURGERY

## 2023-08-31 PROCEDURE — 73562 X-RAY EXAM OF KNEE 3: CPT | Mod: 26,50,, | Performed by: INTERNAL MEDICINE

## 2023-08-31 PROCEDURE — 99999 PR PBB SHADOW E&M-EST. PATIENT-LVL II: CPT | Mod: PBBFAC,,, | Performed by: ORTHOPAEDIC SURGERY

## 2023-08-31 PROCEDURE — 99213 OFFICE O/P EST LOW 20 MIN: CPT | Mod: S$PBB,25,, | Performed by: ORTHOPAEDIC SURGERY

## 2023-08-31 PROCEDURE — 73562 X-RAY EXAM OF KNEE 3: CPT | Mod: TC,50,PN

## 2023-08-31 PROCEDURE — 73562 XR KNEE ORTHO BILAT: ICD-10-PCS | Mod: 26,50,, | Performed by: INTERNAL MEDICINE

## 2023-08-31 PROCEDURE — 99212 OFFICE O/P EST SF 10 MIN: CPT | Mod: PBBFAC,PN,25 | Performed by: ORTHOPAEDIC SURGERY

## 2023-08-31 PROCEDURE — 99999PBSHW PR PBB SHADOW TECHNICAL ONLY FILED TO HB: ICD-10-PCS | Mod: PBBFAC,,,

## 2023-08-31 PROCEDURE — 99999PBSHW PR PBB SHADOW TECHNICAL ONLY FILED TO HB: Mod: PBBFAC,,,

## 2023-08-31 RX ORDER — TRIAMCINOLONE ACETONIDE 40 MG/ML
40 INJECTION, SUSPENSION INTRA-ARTICULAR; INTRAMUSCULAR
Status: DISCONTINUED | OUTPATIENT
Start: 2023-08-31 | End: 2023-08-31 | Stop reason: HOSPADM

## 2023-08-31 RX ADMIN — TRIAMCINOLONE ACETONIDE 40 MG: 40 INJECTION, SUSPENSION INTRA-ARTICULAR; INTRAMUSCULAR at 02:08

## 2023-08-31 NOTE — PROCEDURES
Large Joint Aspiration/Injection: L knee    Date/Time: 8/31/2023 2:45 PM    Performed by: Austin Lopez MD  Authorized by: Austin Lopez MD    Location:  Knee  Site:  L knee  Medications:  40 mg triamcinolone acetonide 40 mg/mL     After obtaining verbal informed consent the patient's left knee was prepped aseptically and injected through an inferior lateral approach using 40 mg of triamcinolone and 1 cc of 1% plain Xylocaine.  The patient was warned about postinjection flare and how to manage it with ice, rest and over-the-counter analgesics.  They're advised to contact me for any severe, uncontrolled pain.

## 2023-08-31 NOTE — PROGRESS NOTES
Subjective:      Patient ID: Chloe Foster is a 89 y.o. female.    Chief Complaint: left left Knee Pain    HPI    Follow-up for osteoarthritis.  The patient and her son reports some increase in pain recently.  The patient reports that she can walk without severe pain but has some discomfort with exercise.          Review of Systems   Constitutional: Negative for fever and weight loss.   HENT:  Negative for congestion.    Eyes:  Negative for visual disturbance.   Cardiovascular:  Negative for chest pain.   Respiratory:  Negative for shortness of breath.    Hematologic/Lymphatic: Negative for bleeding problem. Does not bruise/bleed easily.   Skin:  Negative for poor wound healing.   Gastrointestinal:  Negative for abdominal pain.   Genitourinary:  Negative for dysuria.   Neurological:  Negative for seizures.   Psychiatric/Behavioral:  Negative for altered mental status.    Allergic/Immunologic: Negative for persistent infections.         Objective:      Ortho/SPM Exam      The patient is not in acute distress.   Body habitus is normal.   Sclera appear normal  No respiratory distress  Resisted SLR negative.   The skin over the knee is intact.  Knee effusion trace  Tendernes is located absent.  Range of motion- Flexion full, Extension full.   Ligament exam:              MCL trace laxity              Lachman intact              Post sag intact              LCL intact  Patellar apprehension negative.  Popliteal cyst negative  Patellar crepitation absent.  Flexion/pinch negative.  Pulses DP present, PT present.  Motor normal 5/5 strength in all tested muscle groups.   Sensory normal.              Assessment:       Encounter Diagnosis   Name Primary?    Primary osteoarthritis of left knee Yes        No evidence of major structural progression          Plan:       Chloe was seen today for knee pain.    Diagnoses and all orders for this visit:    Primary osteoarthritis of left knee          Injection-  ICG

## 2023-10-06 NOTE — TELEPHONE ENCOUNTER
na  
Please refer to PT initial eval 10/4/23
pt lives with spouse who is also presently admitted in the hospital for stroke, in a private home with lots of steps to enter as per pt. pt  pt PLOF is indep in bed mob and transfers, able to amb ad-lid w/o AD,  pt has h/o fall. pt encountered in bed supine, aaox3, + primafit, + cast on RLE foot sec to s/p fall with proximal fibular fx, pt require max a x 2 for bed mob and transfers, assisted to chair and BP was 62/48 mmhg, o2 at room air is 78%, pt uses home o2 PRN. pt assisted back to bed and BP is 91/60 mmhg, given back her 2l/min nco2. RLE placed on pillow. left comfortably with student nurses and nurse in the room.

## 2023-10-10 ENCOUNTER — OFFICE VISIT (OUTPATIENT)
Dept: FAMILY MEDICINE | Facility: CLINIC | Age: 88
End: 2023-10-10
Payer: MEDICARE

## 2023-10-10 VITALS
BODY MASS INDEX: 23.69 KG/M2 | DIASTOLIC BLOOD PRESSURE: 64 MMHG | HEART RATE: 82 BPM | WEIGHT: 159.94 LBS | OXYGEN SATURATION: 97 % | HEIGHT: 69 IN | SYSTOLIC BLOOD PRESSURE: 100 MMHG

## 2023-10-10 DIAGNOSIS — N18.31 STAGE 3A CHRONIC KIDNEY DISEASE: ICD-10-CM

## 2023-10-10 DIAGNOSIS — I10 ESSENTIAL HYPERTENSION: ICD-10-CM

## 2023-10-10 DIAGNOSIS — Z00.00 ANNUAL PHYSICAL EXAM: ICD-10-CM

## 2023-10-10 DIAGNOSIS — E03.9 HYPOTHYROIDISM (ACQUIRED): ICD-10-CM

## 2023-10-10 DIAGNOSIS — Z23 FLU VACCINE NEED: ICD-10-CM

## 2023-10-10 PROCEDURE — 99999PBSHW FLU VACCINE - QUADRIVALENT - ADJUVANTED: Mod: PBBFAC,,,

## 2023-10-10 PROCEDURE — 99999 PR PBB SHADOW E&M-EST. PATIENT-LVL IV: CPT | Mod: PBBFAC,,, | Performed by: STUDENT IN AN ORGANIZED HEALTH CARE EDUCATION/TRAINING PROGRAM

## 2023-10-10 PROCEDURE — 99999PBSHW FLU VACCINE - QUADRIVALENT - ADJUVANTED: ICD-10-PCS | Mod: PBBFAC,,,

## 2023-10-10 PROCEDURE — 99397 PER PM REEVAL EST PAT 65+ YR: CPT | Mod: S$PBB,GZ,, | Performed by: STUDENT IN AN ORGANIZED HEALTH CARE EDUCATION/TRAINING PROGRAM

## 2023-10-10 PROCEDURE — G0008 ADMIN INFLUENZA VIRUS VAC: HCPCS | Mod: PBBFAC,PN

## 2023-10-10 PROCEDURE — 99214 OFFICE O/P EST MOD 30 MIN: CPT | Mod: PBBFAC,PN | Performed by: STUDENT IN AN ORGANIZED HEALTH CARE EDUCATION/TRAINING PROGRAM

## 2023-10-10 PROCEDURE — 99397 PR PREVENTIVE VISIT,EST,65 & OVER: ICD-10-PCS | Mod: S$PBB,GZ,, | Performed by: STUDENT IN AN ORGANIZED HEALTH CARE EDUCATION/TRAINING PROGRAM

## 2023-10-10 PROCEDURE — 99999 PR PBB SHADOW E&M-EST. PATIENT-LVL IV: ICD-10-PCS | Mod: PBBFAC,,, | Performed by: STUDENT IN AN ORGANIZED HEALTH CARE EDUCATION/TRAINING PROGRAM

## 2023-10-10 NOTE — PROGRESS NOTES
Subjective:       Patient ID: Chloe Foster is a 89 y.o. female.    Chief Complaint: Follow-up    HPI    89 yo pleasant Female with dementia (mildly dependent on family member(son) on ADLs, sHTN, HLD, hypothyroidism , stage 3a CKD and MVP for f/u annual physicals today. She was accompanied in office by her son today, endorses no new complaints except fluctuating BP readings in the 90s-150s with no symptoms , she recently recovered from mild COVID infection requiring no hospitalization. Her appetite is great with once in a while watery diarrhea and no recent ER of hospitalization.She described her mood to be great as well today.     Review of Systems - Negative except per HPI    Past Medical History:   Diagnosis Date    Anxiety     Black eye 5/6/2013    Dementia without behavioral disturbance 4/15/2021    Dyslipidemia     Essential hypertension 10/10/2022    Hyperthyroidism     IGT (impaired glucose tolerance)     Mitral valve prolapse     Nontoxic multinodular goiter 2/18/2013    Primary hypothyroidism 5/18/2015    Shingles     left side of chest and back    Thyroid nodule 2/18/2013    Urinary tract infection        Past Surgical History:   Procedure Laterality Date    CATARACT EXTRACTION BILATERAL W/ ANTERIOR VITRECTOMY      DILATION AND CURETTAGE OF UTERUS      x2       Family History   Problem Relation Age of Onset    Thyroid disease Father     Thyroid disease Son     Thyroid disease Daughter     Diabetes Neg Hx     Breast cancer Neg Hx     Colon cancer Neg Hx     Ovarian cancer Neg Hx        Social History     Socioeconomic History    Marital status:    Occupational History    Occupation: Retired   Tobacco Use    Smoking status: Never    Smokeless tobacco: Never   Substance and Sexual Activity    Alcohol use: No     Alcohol/week: 0.0 standard drinks of alcohol    Drug use: No    Sexual activity: Never   Social History Narrative    Her  passed away in March 2014. She lives with her  and  "has sitters and a son who helps. She has four children two of whom are twins. Her son is running the family business. Her children are all educated. She worked doing  for the State of [place]. She has an education degree and is a retired . She started a travel agency for 15 years. She is a non-smoker and denies alcohol or substance abuse.                      Social Determinants of Health     Financial Resource Strain: Low Risk  (10/9/2023)    Overall Financial Resource Strain (CARDIA)     Difficulty of Paying Living Expenses: Not hard at all   Food Insecurity: No Food Insecurity (10/9/2023)    Hunger Vital Sign     Worried About Running Out of Food in the Last Year: Never true     Ran Out of Food in the Last Year: Never true   Transportation Needs: No Transportation Needs (10/9/2023)    PRAPARE - Transportation     Lack of Transportation (Medical): No     Lack of Transportation (Non-Medical): No   Physical Activity: Unknown (10/9/2023)    Exercise Vital Sign     Days of Exercise per Week: Patient refused   Stress: Unknown (10/9/2023)    [place] [place] [employer] - Occupational Stress Questionnaire     Feeling of Stress : Patient refused   Social Connections: Unknown (10/9/2023)    Social Connection and Isolation Panel [NHANES]     Frequency of Communication with Friends and Family: More than three times a week     Frequency of Social Gatherings with Friends and Family: Patient refused     Active Member of Clubs or Organizations: No     Attends Club or Organization Meetings: Patient refused     Marital Status:    Housing Stability: Low Risk  (10/9/2023)    Housing Stability Vital Sign     Unable to Pay for Housing in the Last Year: No     Number of Places Lived in the Last Year: 1     Unstable Housing in the Last Year: No         Objective:     Vitals:    10/10/23 1109   BP: 100/64   Pulse: 82   SpO2: 97%   Weight: 72.6 kg (159 lb 15.1 oz)   Height: 5' 9" (1.753 m) " "         Physical Exam  Vitals reviewed.   Constitutional:       Appearance: Normal appearance. She is normal weight.   HENT:      Mouth/Throat:      Mouth: Mucous membranes are moist.      Pharynx: Oropharynx is clear.   Eyes:      Extraocular Movements: Extraocular movements intact.      Conjunctiva/sclera: Conjunctivae normal.      Pupils: Pupils are equal, round, and reactive to light.   Cardiovascular:      Rate and Rhythm: Normal rate and regular rhythm.      Heart sounds: Normal heart sounds. Murmur: opening snap.   Pulmonary:      Effort: Pulmonary effort is normal.      Breath sounds: Normal breath sounds. No stridor. No wheezing or rales.   Abdominal:      General: Abdomen is flat. Bowel sounds are normal.      Palpations: Abdomen is soft.   Musculoskeletal:      Right lower leg: No edema.      Left lower leg: No edema.   Skin:     General: Skin is warm.      Coloration: Skin is not jaundiced.      Findings: No bruising.   Neurological:      General: No focal deficit present.      Mental Status: She is alert.      Gait: Gait normal.           Laboratory:  CBC:  No results for input(s): "WBC", "RBC", "HGB", "HCT", "PLT", "MCV", "MCH", "MCHC" in the last 2160 hours.    CMP:  No results for input(s): "GLU", "CALCIUM", "ALBUMIN", "PROT", "NA", "K", "CO2", "CL", "BUN", "ALKPHOS", "ALT", "AST", "BILITOT" in the last 2160 hours.    Invalid input(s): "CREATININ"    URINALYSIS:  No results for input(s): "COLORU", "CLARITYU", "SPECGRAV", "PHUR", "PROTEINUA", "GLUCOSEU", "BILIRUBINCON", "BLOODU", "WBCU", "RBCU", "BACTERIA", "MUCUS", "NITRITE", "LEUKOCYTESUR", "UROBILINOGEN", "HYALINECASTS" in the last 2160 hours.   LIPIDS:  No results for input(s): "TSH", "HDL", "CHOL", "TRIG", "LDLCALC", "CHOLHDL", "NONHDLCHOL", "TOTALCHOLEST" in the last 2160 hours.    TSH:  No results for input(s): "TSH" in the last 2160 hours.    A1C:  No results for input(s): "HGBA1C" in the last 2160 hours.    Assessment:         ICD-10-CM " ICD-9-CM   1. Annual physical exam  Z00.00 V70.0   2. Essential hypertension  I10 401.9   3. Stage 3a chronic kidney disease  N18.31 585.3   4. Hypothyroidism (acquired)  E03.9 244.9   5. Flu vaccine need  Z23 V04.81       Plan:       Annual physicals   -preventive counseling provided  -Labs due ordered  -UTD on all vaccines except flu(ordered today), and COVID     Late onset Alzheimer's dementia without behavioral disturbance  -except intermittent bouts of anxiety which is easily corrected with redirection  -watchful waiting for now  -c/w zoloft    Hypothyroidism (acquired)  - last TSH WNL (10/10/22)  -due for repeat TFT, ordered  -c/w 75mcg for now    CKD stage 3a  -stable renal function  -avoid nephrotoxins    sHTN  -with intermittent hypotension alternating hypertension(labile)  -likely autonomic dysfunction  -currently off all antihypertensives  -BP well controlled in office today  -watchful waiting for now      Patient's Medications   New Prescriptions    No medications on file   Previous Medications    ANTIOX#10/OM3/DHA/EPA/LUT/ZEAX (I-CAPS ORAL)    Take 2 capsules by mouth once daily.    CALCIUM-VITAMIN D (OSCAL) 250 (625)-125 MG-UNIT PER TABLET    Take 1 tablet by mouth 2 (two) times daily.    DICLOFENAC SODIUM (VOLTAREN) 1 % GEL    Apply 2 g topically 2 (two) times daily.    DONEPEZIL (ARICEPT) 5 MG TABLET    Take 1 tablet (5 mg total) by mouth once daily.    FLUOCINOLONE ACETONIDE OIL (DERMOTIC OIL) 0.01 % DROP    Place 3 drops in ear(s) 2 (two) times daily.    LEVOTHYROXINE (SYNTHROID) 75 MCG TABLET    Take 1 tablet (75 mcg total) by mouth before breakfast.    MULTIVIT-IRON-FA-CALCIUM-MINS 18 MG IRON-400 MCG-500 MG CA TAB    Take 1 tablet by mouth once daily.    PREDNISOLONE ACETATE (PRED FORTE) 1 % DRPS    Apply 2 drops  to affected ear BID    SERTRALINE (ZOLOFT) 100 MG TABLET    Take 1 tablet (100 mg total) by mouth once daily.   Modified Medications    No medications on file   Discontinued Medications     AMLODIPINE (NORVASC) 2.5 MG TABLET    Take 1 tablet (2.5 mg total) by mouth once daily.    CLOTRIMAZOLE (LOTRIMIN) 1 % SOLN    Apply to affected ear twice daily x 14 days    FLUTICASONE PROPIONATE (FLONASE) 50 MCG/ACTUATION NASAL SPRAY    1 spray (50 mcg total) by Each Nostril route once daily.    LEVOCETIRIZINE (XYZAL) 5 MG TABLET    Take 1 tablet (5 mg total) by mouth every evening.    NIRMATRELVIR-RITONAVIR (PAXLOVID) 300 MG (150 MG X 2)-100 MG COPACKAGED TABLETS (EUA)    Take 3 tablets by mouth 2 (two) times daily. Each dose contains 2 nirmatrelvir (pink tablets) and 1 ritonavir (white tablet). Take all 3 tablets together for the next 5 days as prescribed    OFLOXACIN (FLOXIN) 0.3 % OTIC SOLUTION        TRIAMCINOLONE ACETONIDE 0.1% (KENALOG) 0.1 % OINTMENT    Apply topically 2 (two) times daily. To external ears for 7 days       Patient and son were given opportunity to express concerns, ask questions and verbalizes understanding of current management plan.    Dr Denise Naqvi MD

## 2023-10-13 ENCOUNTER — PATIENT MESSAGE (OUTPATIENT)
Dept: FAMILY MEDICINE | Facility: CLINIC | Age: 88
End: 2023-10-13
Payer: MEDICARE

## 2023-10-17 ENCOUNTER — PATIENT MESSAGE (OUTPATIENT)
Dept: ORTHOPEDICS | Facility: CLINIC | Age: 88
End: 2023-10-17
Payer: MEDICARE

## 2023-10-22 ENCOUNTER — PATIENT MESSAGE (OUTPATIENT)
Dept: AUDIOLOGY | Facility: CLINIC | Age: 88
End: 2023-10-22
Payer: MEDICARE

## 2023-10-24 ENCOUNTER — CLINICAL SUPPORT (OUTPATIENT)
Dept: AUDIOLOGY | Facility: CLINIC | Age: 88
End: 2023-10-24
Payer: MEDICARE

## 2023-10-24 DIAGNOSIS — G30.1 MILD LATE ONSET ALZHEIMER'S DEMENTIA WITHOUT BEHAVIORAL DISTURBANCE, PSYCHOTIC DISTURBANCE, MOOD DISTURBANCE, OR ANXIETY: ICD-10-CM

## 2023-10-24 DIAGNOSIS — F02.A0 MILD LATE ONSET ALZHEIMER'S DEMENTIA WITHOUT BEHAVIORAL DISTURBANCE, PSYCHOTIC DISTURBANCE, MOOD DISTURBANCE, OR ANXIETY: ICD-10-CM

## 2023-10-24 DIAGNOSIS — H90.3 SENSORINEURAL HEARING LOSS, BILATERAL: Primary | ICD-10-CM

## 2023-10-24 DIAGNOSIS — E89.0 POSTABLATIVE HYPOTHYROIDISM: ICD-10-CM

## 2023-10-24 DIAGNOSIS — R53.83 FATIGUE, UNSPECIFIED TYPE: ICD-10-CM

## 2023-10-24 NOTE — Clinical Note
Alma Mack,  You saw this patient of mine a while back for cerumen management and right ear infection. She has irritation again in the right ear. Some cerumen was easily removed from the outside of both ear canals which unblocked them, but the right ear showed signs of infection. Could you have your staff call her to see her? I'm changing her hearing aid ear pieces in case it is the silicone.  Thanks,  Michelle

## 2023-10-24 NOTE — PROGRESS NOTES
HEARING AID FOLLOW-UP     Mrs. Foster was in clinic for a hearing aid check and possible repair. Her son reported that the right hearing aid stopped working on Sunday, even after cleaning and replacing the dome. Right  was intermittent and replaced. Hearing aids were connected wirelessly to [employer] software. A firmware update was completed. Otoscopy revealed moderately occluding cerumen and debris in both ears. All cerumen was removed from both ears without incident using an illuminated curette. Mrs. Corona tolerated the procedure well. The right ear, specifically in the juventino bowl, had redness and dry skin that seemed indicative of irritation. It was recommended that Mrs. Corona follow up with ENT as well as get fit for custom ear molds to remove use of silicone domes and begin using acrylic molds. Impressions were taken without incident of both ears. Mrs. Corona's son agreed to the price estimate for the ear molds. I will call them upon receipt of the ear molds from the  to schedule a follow-up.      Hearing Aid Details      & Model: Phonak Audeo L30-R  Color: Champagne  Rt SN: [id number]  Lt SN: [id number]  Battery: Li-Ion   Rt : 2 P  Lt : 2 MP  Domes: medium power domes  Repair Warranty Exp: 07/26/2026  L&D Warranty Exp: 07/26/2026   SN: [id number]     Remote Control  SN: [id number]  Warranty: 07/26/2024

## 2023-10-25 DIAGNOSIS — R53.83 FATIGUE, UNSPECIFIED TYPE: ICD-10-CM

## 2023-10-25 DIAGNOSIS — E89.0 POSTABLATIVE HYPOTHYROIDISM: ICD-10-CM

## 2023-10-25 RX ORDER — LEVOTHYROXINE SODIUM 75 UG/1
75 TABLET ORAL
Qty: 90 TABLET | Refills: 3 | Status: SHIPPED | OUTPATIENT
Start: 2023-10-25

## 2023-10-25 RX ORDER — DONEPEZIL HYDROCHLORIDE 5 MG/1
5 TABLET, FILM COATED ORAL DAILY
Qty: 90 TABLET | Refills: 3 | Status: SHIPPED | OUTPATIENT
Start: 2023-10-25

## 2023-10-25 RX ORDER — SERTRALINE HYDROCHLORIDE 100 MG/1
100 TABLET, FILM COATED ORAL DAILY
Qty: 90 TABLET | Refills: 3 | Status: SHIPPED | OUTPATIENT
Start: 2023-10-25 | End: 2023-10-25 | Stop reason: SDUPTHER

## 2023-10-25 RX ORDER — SERTRALINE HYDROCHLORIDE 100 MG/1
100 TABLET, FILM COATED ORAL DAILY
Qty: 90 TABLET | Refills: 3 | Status: SHIPPED | OUTPATIENT
Start: 2023-10-25

## 2023-10-25 RX ORDER — LEVOTHYROXINE SODIUM 75 UG/1
75 TABLET ORAL
Qty: 90 TABLET | Refills: 3 | Status: SHIPPED | OUTPATIENT
Start: 2023-10-25 | End: 2023-10-25 | Stop reason: SDUPTHER

## 2023-10-31 ENCOUNTER — OFFICE VISIT (OUTPATIENT)
Dept: OTOLARYNGOLOGY | Facility: CLINIC | Age: 88
End: 2023-10-31
Payer: MEDICARE

## 2023-10-31 VITALS
SYSTOLIC BLOOD PRESSURE: 129 MMHG | WEIGHT: 149.81 LBS | BODY MASS INDEX: 22.19 KG/M2 | DIASTOLIC BLOOD PRESSURE: 69 MMHG | HEIGHT: 69 IN

## 2023-10-31 DIAGNOSIS — S09.21XS: ICD-10-CM

## 2023-10-31 DIAGNOSIS — Z97.4 WEARS HEARING AID IN BOTH EARS: ICD-10-CM

## 2023-10-31 DIAGNOSIS — H60.91 OTITIS EXTERNA OF RIGHT EAR, UNSPECIFIED CHRONICITY, UNSPECIFIED TYPE: Primary | ICD-10-CM

## 2023-10-31 DIAGNOSIS — H61.23 CERUMEN DEBRIS ON TYMPANIC MEMBRANE OF BOTH EARS: ICD-10-CM

## 2023-10-31 PROCEDURE — 99213 OFFICE O/P EST LOW 20 MIN: CPT | Mod: 25,S$PBB,, | Performed by: NURSE PRACTITIONER

## 2023-10-31 PROCEDURE — 99999 PR PBB SHADOW E&M-EST. PATIENT-LVL III: ICD-10-PCS | Mod: PBBFAC,,, | Performed by: NURSE PRACTITIONER

## 2023-10-31 PROCEDURE — 99999 PR PBB SHADOW E&M-EST. PATIENT-LVL III: CPT | Mod: PBBFAC,,, | Performed by: NURSE PRACTITIONER

## 2023-10-31 PROCEDURE — 69210 REMOVE IMPACTED EAR WAX UNI: CPT | Mod: S$PBB,,, | Performed by: NURSE PRACTITIONER

## 2023-10-31 PROCEDURE — 69210 EAR CERUMEN REMOVAL: ICD-10-PCS | Mod: S$PBB,,, | Performed by: NURSE PRACTITIONER

## 2023-10-31 PROCEDURE — 69210 REMOVE IMPACTED EAR WAX UNI: CPT | Mod: 50,PBBFAC,PN | Performed by: NURSE PRACTITIONER

## 2023-10-31 PROCEDURE — 99213 PR OFFICE/OUTPT VISIT, EST, LEVL III, 20-29 MIN: ICD-10-PCS | Mod: 25,S$PBB,, | Performed by: NURSE PRACTITIONER

## 2023-10-31 PROCEDURE — 99213 OFFICE O/P EST LOW 20 MIN: CPT | Mod: PBBFAC,PN | Performed by: NURSE PRACTITIONER

## 2023-10-31 NOTE — PROCEDURES
Ear Cerumen Removal    Date/Time: 10/31/2023 2:20 PM    Performed by: Martina Knight NP  Authorized by: Martina Knight NP    Consent Done?:  Yes (Verbal)  Location details:  Both ears  Procedure type: curette    Procedure type comment:  Suction  Cerumen  Removal Results:  Cerumen completely removed  Patient tolerance:  Patient tolerated the procedure well with no immediate complications

## 2023-10-31 NOTE — PROGRESS NOTES
Chief Complaint   Patient presents with    Follow-up     Right ear infection   .     HPI 5/1/2023: Chloe Foster is a 89 y.o. female who is referred to me by Dr. Denise Guzman* in consultation for possible ear infection. Son notices patient picking at her ears. Patient denies pain and itchiness. Chloe has had approximately numerous episodes of otitis externa in the past several years. The infections typically affect the right ear and are typically manifested by ear drainage.  The last ear infection was 2 months ago.  Her nasal symptoms consist of clear rhinorrhea.      She was started on ofloxacin and Pred Forte drops on 4/26/2023 for right ear drainage. She went for hearing aid fitting on 4/28/2023 and audiologist noted drainage in her right ear.     Interval HPI 5/17/2023:  Follow up visit. She is here with her daughter today. States she is doing well. Denies ear pain and drainage. She recently had new hearing aids and is hearing better. No complaints today. Ear culture on 5/1/2023 was positive for aspergillus flavus. She has completed clotrimazole otic drops x 14 days.     Interval HPI 10/31/2023:  Follow up visit.  She had seen Michelle Leo, audiology, on 10/24/23 for hearing aid check and repair. Mrs. Leo noticed redness and irritation in her right ear after cerumen removal.  The patient is here to evaluate for possible ear infection. She has a hx of right perforated TM. Her son states that he has started ofloxacin and Pred Forte from previous ear infection and has started applying the drops in her right ear twice a day for 5 days now. Denies ear pain and drainage.       Past Medical History:   Diagnosis Date    Anxiety     Black eye 5/6/2013    Dementia without behavioral disturbance 4/15/2021    Dyslipidemia     Essential hypertension 10/10/2022    Hyperthyroidism     IGT (impaired glucose tolerance)     Mitral valve prolapse     Nontoxic multinodular goiter 2/18/2013    Primary hypothyroidism  5/18/2015    Shingles     left side of chest and back    Thyroid nodule 2/18/2013    Urinary tract infection      Social History     Socioeconomic History    Marital status:    Occupational History    Occupation: Retired   Tobacco Use    Smoking status: Never    Smokeless tobacco: Never   Substance and Sexual Activity    Alcohol use: No     Alcohol/week: 0.0 standard drinks of alcohol    Drug use: No    Sexual activity: Never   Social History Narrative    Her  passed away in March 2014. She lives with her  and has sitters and a son who helps. She has four children two of whom are twins. Her son is running the family business. Her children are all educated. She worked doing  for the [employer]. She has an education degree and is a retired . She started a travel agency for 15 years. She is a non-smoker and denies alcohol or substance abuse.                      Social Determinants of Health     Financial Resource Strain: Low Risk  (10/9/2023)    Overall Financial Resource Strain (CARDIA)     Difficulty of Paying Living Expenses: Not hard at all   Food Insecurity: No Food Insecurity (10/9/2023)    Hunger Vital Sign     Worried About Running Out of Food in the Last Year: Never true     Ran Out of Food in the Last Year: Never true   Transportation Needs: No Transportation Needs (10/9/2023)    PRAPARE - Transportation     Lack of Transportation (Medical): No     Lack of Transportation (Non-Medical): No   Physical Activity: Unknown (10/9/2023)    Exercise Vital Sign     Days of Exercise per Week: Patient refused   Stress: Unknown (10/9/2023)    [place] [employer] [place] [employer] - Occupational Stress Questionnaire     Feeling of Stress : Patient refused   Social Connections: Unknown (10/9/2023)    Social Connection and Isolation Panel [NHANES]     Frequency of Communication with Friends and Family: More than three times a week     Frequency of Social Gatherings with  Friends and Family: Patient refused     Active Member of Clubs or Organizations: No     Attends Club or Organization Meetings: Patient refused     Marital Status:    Housing Stability: Low Risk  (10/9/2023)    Housing Stability Vital Sign     Unable to Pay for Housing in the Last Year: No     Number of Places Lived in the Last Year: 1     Unstable Housing in the Last Year: No     Past Surgical History:   Procedure Laterality Date    CATARACT EXTRACTION BILATERAL W/ ANTERIOR VITRECTOMY      DILATION AND CURETTAGE OF UTERUS      x2     Family History   Problem Relation Age of Onset    Thyroid disease Father     Thyroid disease Son     Thyroid disease Daughter     Diabetes Neg Hx     Breast cancer Neg Hx     Colon cancer Neg Hx     Ovarian cancer Neg Hx            Review of Systems  General: negative for chills, fever or weight loss  Psychological: negative for mood changes or depression  Ophthalmic: negative for blurry vision, photophobia or eye pain  ENT: see HPI  Respiratory: no cough, shortness of breath, or wheezing  Cardiovascular: no chest pain or dyspnea on exertion  Gastrointestinal: no abdominal pain, change in bowel habits, or black/ bloody stools  Musculoskeletal: negative for gait disturbance or muscular weakness  Neurological: no syncope or seizures; no ataxia  Dermatological: negative for pruritis,  rash and jaundice  Hemotologic/Lymphatic: no new adenopathy, no easy bruising or bleeding      Physical Exam:    Vitals:    10/31/23 1410   BP: 129/69           Constitutional: Well appearing / communicating without difficutly.  NAD.  Eyes: EOM I Bilaterally  Head/Face: Normocephalic.  Negative paranasal sinus pressure/tenderness.  Salivary glands WNL.  House Brackmann I Bilaterally.    Right Ear: Auricle WNL.  EAC within normal limits no lesions or masses,TM with perforation (25%).   Physical Exam  HENT:      Ears:        Comments: Perforated tympanic membrane, dry and no erythema          Left Ear:  Auricle WNL. EAC within normal limits no lesions or masses,TM w/o masses/lesions/perforations. TM mobility noted.  Nose: No gross nasal septal deviation. Inferior Turbinates 3+ bilaterally. No septal perforation. No masses/lesions. External nasal skin appears normal without masses/lesions.  Oral Cavity: Gingiva/lips within normal limits.  Dentition/gingiva healthy appearing. Mucus membranes moist. Floor of mouth soft, no masses palpated. Oral Tongue mobile. Hard Palate appears normal.    Oropharynx: Base of tongue appears normal. No masses/lesions noted. Tonsillar fossa/pharyngeal wall without lesions. Posterior oropharynx WNL.  Soft palate without masses. Midline uvula.   Neck/Lymphatic: No LAD I-VI bilaterally.  No thyromegaly.  No masses noted on exam.    Mirror laryngoscopy/nasopharyngoscopy: Active gag reflex.  Unable to perform.    Neuro/Psychiatric: AOx3.  Normal mood and affect.   Cardiovascular: Normal carotid pulses bilaterally, no increasing jugular venous distention noted at cervical region bilaterally.    Respiratory: Normal respiratory effort, no stridor, no retractions noted.    Ear Cerumen Removal    Date/Time: 10/31/2023 2:20 PM    Performed by: Martina Knight, ZOË  Authorized by: Martina Knight, NP    Consent Done?:  Yes (Verbal)  Location details:  Both ears  Procedure type: curette    Procedure type comment:  Suction  Cerumen  Removal Results:  Cerumen completely removed  Patient tolerance:  Patient tolerated the procedure well with no immediate complications          Assessment:    ICD-10-CM ICD-9-CM    1. Otitis externa of right ear, unspecified chronicity, unspecified type  H60.91 380.10       2. Perforation of tympanic membrane, traumatic, right, sequela  S09.21XS 906.0      E989       3. Wears hearing aid in both ears  Z97.4 [id number]       4. Cerumen debris on tympanic membrane of both ears  H61.23 380.4 Ear Cerumen Removal            The primary encounter diagnosis was Otitis externa of  right ear, unspecified chronicity, unspecified type. Diagnoses of Perforation of tympanic membrane, traumatic, right, sequela, Wears hearing aid in both ears, and Cerumen debris on tympanic membrane of both ears were also pertinent to this visit.      Plan:  Orders Placed This Encounter   Procedures    Ear Cerumen Removal     Cerumen impaction:  Removed today without difficulty.  I would recommend the use of a wax softening drop, either over the counter Debrox or mineral oil, on a weekly basis.  I also instructed the patient to avoid Qtips.    -no erythema or drainage noted in bilateral ears  -continue ofloxacin 3 drops in right ear bid for another 5 days  -continue Pred Forte 2 drops in right ear bid for another 5 days  -recommended yearly audiograms  -f/u 6 months or sooner as needed    Martina Knight NP        Answers submitted by the patient for this visit:  Review of Symptoms Questionnaire  (Submitted on 5/17/2023)  None of these: Yes  hearing loss: Yes  Ear infection(s)?: Yes  None of these : Yes  None of these: Yes  None of these : Yes  None of these: Yes  None of these: Yes  None of these: Yes  None of these : Yes  None of these: Yes  None of these : Yes  None of these: Yes  None of these: Yes  None of these: Yes

## 2023-11-02 ENCOUNTER — TELEPHONE (OUTPATIENT)
Dept: AUDIOLOGY | Facility: CLINIC | Age: 88
End: 2023-11-02
Payer: MEDICARE

## 2023-11-02 ENCOUNTER — PATIENT MESSAGE (OUTPATIENT)
Dept: AUDIOLOGY | Facility: CLINIC | Age: 88
End: 2023-11-02
Payer: MEDICARE

## 2023-11-02 NOTE — TELEPHONE ENCOUNTER
Called to inform Mrs. Foster's son that her custom ear molds are ready to be fit.       Mold Info:   cShell 4.0 Acryl  Left: [id number]  ACC: [id number]  Right: [id number]  ACC: [id number]    Service Warranty Exp: 01/29/2024

## 2023-11-07 ENCOUNTER — CLINICAL SUPPORT (OUTPATIENT)
Dept: AUDIOLOGY | Facility: CLINIC | Age: 88
End: 2023-11-07
Payer: MEDICARE

## 2023-11-07 DIAGNOSIS — H90.3 SENSORINEURAL HEARING LOSS, BILATERAL: Primary | ICD-10-CM

## 2023-11-07 NOTE — PROGRESS NOTES
HEARING AID FOLLOW-UP    Chloe Foster was seen today for a hearing aid follow-up to be fit with her custom ear molds. Mrs. Foster was accompanied by her daughter, Chloe. New receivers with custom molds were placed on devices. Devices were connected wirelessly to [employer] software and acoustic codes were added. Feedback measures and programming was adjusted accordingly. Mrs. Foster reported the hearing aids were significantly louder and had to decrease gain. She was counseled that her hearing aids would sound more natural as the day progresses as the molds gained her access to more input from the speakers. Mrs. Foster was able to easily remove and insert the hearing aids. She was advised to return to clinic as needed for adjustments. Her daughter was informed that the programming was turned down below target and would likely need to be increased in the future.       Hearing Aid Details     & Model: Phonak Audeo L30-R  Color: Champagne  Rt SN: [id number]  Lt SN: [id number]  Battery: Li-Ion   Rt : 2 P  Lt : 2 MP  Domes: medium power domes  Repair Warranty Exp: 07/26/2026  L&D Warranty Exp: 07/26/2026   SN: [id number]     Remote Control  SN: [id number]  Warranty: 07/26/2024    Mold Info:   cShell 4.0 Acryl  Left: [id number]  ACC: [id number]  Right: [id number]  ACC: [id number]     Service Warranty Exp: 01/29/2024

## 2023-11-14 ENCOUNTER — OFFICE VISIT (OUTPATIENT)
Dept: ORTHOPEDICS | Facility: CLINIC | Age: 88
End: 2023-11-14
Payer: MEDICARE

## 2023-11-14 DIAGNOSIS — M17.12 PRIMARY OSTEOARTHRITIS OF LEFT KNEE: Primary | ICD-10-CM

## 2023-11-14 PROCEDURE — 99999 PR PBB SHADOW E&M-EST. PATIENT-LVL III: ICD-10-PCS | Mod: PBBFAC,,, | Performed by: ORTHOPAEDIC SURGERY

## 2023-11-14 PROCEDURE — 99213 PR OFFICE/OUTPT VISIT, EST, LEVL III, 20-29 MIN: ICD-10-PCS | Mod: S$PBB,25,, | Performed by: ORTHOPAEDIC SURGERY

## 2023-11-14 PROCEDURE — 20610 DRAIN/INJ JOINT/BURSA W/O US: CPT | Mod: PBBFAC,PN,LT | Performed by: ORTHOPAEDIC SURGERY

## 2023-11-14 PROCEDURE — 99999PBSHW PR PBB SHADOW TECHNICAL ONLY FILED TO HB: ICD-10-PCS | Mod: PBBFAC,,,

## 2023-11-14 PROCEDURE — 99999PBSHW PR PBB SHADOW TECHNICAL ONLY FILED TO HB: Mod: PBBFAC,,,

## 2023-11-14 PROCEDURE — 99213 OFFICE O/P EST LOW 20 MIN: CPT | Mod: PBBFAC,PN,25 | Performed by: ORTHOPAEDIC SURGERY

## 2023-11-14 PROCEDURE — 20610 LARGE JOINT ASPIRATION/INJECTION: L KNEE: ICD-10-PCS | Mod: S$PBB,LT,, | Performed by: ORTHOPAEDIC SURGERY

## 2023-11-14 PROCEDURE — 99999 PR PBB SHADOW E&M-EST. PATIENT-LVL III: CPT | Mod: PBBFAC,,, | Performed by: ORTHOPAEDIC SURGERY

## 2023-11-14 PROCEDURE — 99213 OFFICE O/P EST LOW 20 MIN: CPT | Mod: S$PBB,25,, | Performed by: ORTHOPAEDIC SURGERY

## 2023-11-14 RX ORDER — TRIAMCINOLONE ACETONIDE 40 MG/ML
40 INJECTION, SUSPENSION INTRA-ARTICULAR; INTRAMUSCULAR
Status: DISCONTINUED | OUTPATIENT
Start: 2023-11-14 | End: 2023-11-14 | Stop reason: HOSPADM

## 2023-11-14 RX ADMIN — TRIAMCINOLONE ACETONIDE 40 MG: 40 INJECTION, SUSPENSION INTRA-ARTICULAR; INTRAMUSCULAR at 09:11

## 2023-11-14 NOTE — PROGRESS NOTES
Subjective:      Patient ID: Chloe Foster is a 89 y.o. female.    Chief Complaint: Pain of the Left Knee    HPI    Follow-up for osteoarthritis.  The patient is seen along with her daughter.  She is managed with cane usage and occasional injection.  She reports intermittent episodes of knee pain that are not severe or disabling at this time.          Review of Systems   Constitutional: Negative for fever and weight loss.   HENT:  Negative for congestion.    Eyes:  Negative for visual disturbance.   Cardiovascular:  Negative for chest pain.   Respiratory:  Negative for shortness of breath.    Hematologic/Lymphatic: Negative for bleeding problem. Does not bruise/bleed easily.   Skin:  Negative for poor wound healing.   Musculoskeletal:  Positive for joint pain.   Gastrointestinal:  Negative for abdominal pain.   Genitourinary:  Negative for dysuria.   Neurological:  Negative for seizures.   Psychiatric/Behavioral:  Negative for altered mental status.    Allergic/Immunologic: Negative for persistent infections.   T    Objective:      Ortho/SPM Exam      The patient is not in acute distress.   Body habitus is normal.   Sclera appear normal  No respiratory distress  No definite limp using cane  Resisted SLR negative.   The skin over the knee is intact.  Knee effusion trace  Tendernes is located absent.  Range of motion- Flexion full, Extension full.   Ligament exam:              MCL trace laxity              Lachman intact              Post sag intact              LCL intact  Patellar apprehension negative.  Popliteal cyst negative  Patellar crepitation absent.  Flexion/pinch negative.  Pulses DP present, PT present.  Motor normal 5/5 strength in all tested muscle groups.   Sensory normal.              Assessment:       Encounter Diagnosis   Name Primary?    Primary osteoarthritis of left knee Yes        The condition is radiographically moderate.  The patient has relatively low physical demands and has satisfactory  symptom control with the present regimen          Plan:       Chloe was seen today for pain.    Diagnoses and all orders for this visit:    Primary osteoarthritis of left knee          Reinjection recommended and consent given

## 2023-11-14 NOTE — PROCEDURES
Large Joint Aspiration/Injection: L knee    Date/Time: 11/14/2023 9:30 AM    Performed by: Austin Lopez MD  Authorized by: Austin Lopez MD    Location:  Knee  Site:  L knee  Medications:  40 mg triamcinolone acetonide 40 mg/mL     After obtaining verbal informed consent the patient's left knee was prepped aseptically and injected through an inferior lateral approach using 40 mg of triamcinolone and 1 cc of 1% plain Xylocaine.  The patient was warned about postinjection flare and how to manage it with ice, rest and over-the-counter analgesics.  They're advised to contact me for any severe, uncontrolled pain.

## 2023-12-29 ENCOUNTER — PATIENT MESSAGE (OUTPATIENT)
Dept: ORTHOPEDICS | Facility: CLINIC | Age: 88
End: 2023-12-29
Payer: MEDICARE

## 2024-01-11 DIAGNOSIS — Z00.00 ENCOUNTER FOR MEDICARE ANNUAL WELLNESS EXAM: ICD-10-CM

## 2024-02-26 ENCOUNTER — PATIENT MESSAGE (OUTPATIENT)
Dept: ORTHOPEDICS | Facility: CLINIC | Age: 89
End: 2024-02-26
Payer: MEDICARE

## 2024-03-21 ENCOUNTER — OFFICE VISIT (OUTPATIENT)
Dept: OTOLARYNGOLOGY | Facility: CLINIC | Age: 89
End: 2024-03-21
Payer: MEDICARE

## 2024-03-21 ENCOUNTER — PATIENT MESSAGE (OUTPATIENT)
Dept: OTOLARYNGOLOGY | Facility: CLINIC | Age: 89
End: 2024-03-21

## 2024-03-21 VITALS
HEART RATE: 81 BPM | BODY MASS INDEX: 24.03 KG/M2 | DIASTOLIC BLOOD PRESSURE: 76 MMHG | WEIGHT: 162.69 LBS | SYSTOLIC BLOOD PRESSURE: 144 MMHG

## 2024-03-21 DIAGNOSIS — S09.21XS: ICD-10-CM

## 2024-03-21 DIAGNOSIS — Z97.4 WEARS HEARING AID IN BOTH EARS: ICD-10-CM

## 2024-03-21 DIAGNOSIS — H60.91 OTITIS EXTERNA OF RIGHT EAR, UNSPECIFIED CHRONICITY, UNSPECIFIED TYPE: Primary | ICD-10-CM

## 2024-03-21 PROCEDURE — 99999 PR PBB SHADOW E&M-EST. PATIENT-LVL III: CPT | Mod: PBBFAC,,, | Performed by: NURSE PRACTITIONER

## 2024-03-21 PROCEDURE — 99214 OFFICE O/P EST MOD 30 MIN: CPT | Mod: 25,S$PBB,, | Performed by: NURSE PRACTITIONER

## 2024-03-21 PROCEDURE — 87106 FUNGI IDENTIFICATION YEAST: CPT | Performed by: NURSE PRACTITIONER

## 2024-03-21 PROCEDURE — 97597 DBRDMT OPN WND 1ST 20 CM/<: CPT | Mod: PBBFAC,PN | Performed by: NURSE PRACTITIONER

## 2024-03-21 PROCEDURE — 87070 CULTURE OTHR SPECIMN AEROBIC: CPT | Performed by: NURSE PRACTITIONER

## 2024-03-21 PROCEDURE — 99213 OFFICE O/P EST LOW 20 MIN: CPT | Mod: PBBFAC,PN | Performed by: NURSE PRACTITIONER

## 2024-03-21 PROCEDURE — 87107 FUNGI IDENTIFICATION MOLD: CPT | Mod: 59 | Performed by: NURSE PRACTITIONER

## 2024-03-21 PROCEDURE — 97597 DBRDMT OPN WND 1ST 20 CM/<: CPT | Mod: S$PBB,,, | Performed by: NURSE PRACTITIONER

## 2024-03-21 RX ORDER — PREDNISOLONE ACETATE 10 MG/ML
SUSPENSION/ DROPS OPHTHALMIC
Qty: 10 ML | Refills: 2 | Status: SHIPPED | OUTPATIENT
Start: 2024-03-21 | End: 2024-03-21 | Stop reason: SDUPTHER

## 2024-03-21 RX ORDER — CLOTRIMAZOLE 1 G/ML
SOLUTION TOPICAL
Qty: 10 ML | Refills: 0 | Status: SHIPPED | OUTPATIENT
Start: 2024-03-21 | End: 2024-06-17

## 2024-03-21 RX ORDER — PREDNISOLONE ACETATE 10 MG/ML
SUSPENSION/ DROPS OPHTHALMIC
Qty: 10 ML | Refills: 2 | Status: SHIPPED | OUTPATIENT
Start: 2024-03-21

## 2024-03-21 NOTE — PROGRESS NOTES
Chief Complaint   Patient presents with    Cerumen Impaction   .     HPI 5/1/2023: Chloe Foster is a 89 y.o. female who is referred to me by Dr. Denise Guzman* in consultation for possible ear infection. Son notices patient picking at her ears. Patient denies pain and itchiness. Chloe has had approximately numerous episodes of otitis externa in the past several years. The infections typically affect the right ear and are typically manifested by ear drainage.  The last ear infection was 2 months ago.  Her nasal symptoms consist of clear rhinorrhea.      She was started on ofloxacin and Pred Forte drops on 4/26/2023 for right ear drainage. She went for hearing aid fitting on 4/28/2023 and audiologist noted drainage in her right ear.     Interval HPI 5/17/2023:  Follow up visit. She is here with her daughter today. States she is doing well. Denies ear pain and drainage. She recently had new hearing aids and is hearing better. No complaints today. Ear culture on 5/1/2023 was positive for aspergillus flavus. She has completed clotrimazole otic drops x 14 days.     Interval HPI 10/31/2023:  Follow up visit.  She had seen Michelle Leo, audiology, on 10/24/23 for hearing aid check and repair. Mrs. Leo noticed redness and irritation in her right ear after cerumen removal.  The patient is here to evaluate for possible ear infection. She has a hx of right perforated TM. Her son states that he has started ofloxacin and Pred Forte from previous ear infection and has started applying the drops in her right ear twice a day for 5 days now. Denies ear pain and drainage.     Interval HPI 3/21/2024:  Follow up visit. She is here today with her daughter and son-in-law. Per her daughter, she has been having drainage and flaky skin in the right ear. The patient's son has started on the ear drops a couple days ago. She denies ear pain. She has a history of hearing loss and is wearing bilateral hearing aids.       Past  Medical History:   Diagnosis Date    Anxiety     Black eye 5/6/2013    Dementia without behavioral disturbance 4/15/2021    Dyslipidemia     Essential hypertension 10/10/2022    Hyperthyroidism     IGT (impaired glucose tolerance)     Mitral valve prolapse     Nontoxic multinodular goiter 2/18/2013    Primary hypothyroidism 5/18/2015    Shingles     left side of chest and back    Thyroid nodule 2/18/2013    Urinary tract infection      Social History     Socioeconomic History    Marital status:    Occupational History    Occupation: Retired   Tobacco Use    Smoking status: Never    Smokeless tobacco: Never   Substance and Sexual Activity    Alcohol use: No     Alcohol/week: 0.0 standard drinks of alcohol    Drug use: No    Sexual activity: Never   Social History Narrative    Her  passed away in March 2014. She lives with her  and has sitters and a son who helps. She has four children two of whom are twins. Her son is running the family business. Her children are all educated. She worked doing  for the [employer]. She has an education degree and is a retired . She started a travel agency for 15 years. She is a non-smoker and denies alcohol or substance abuse.                      Social Determinants of Health     Financial Resource Strain: Low Risk  (10/9/2023)    Overall Financial Resource Strain (CARDIA)     Difficulty of Paying Living Expenses: Not hard at all   Food Insecurity: No Food Insecurity (10/9/2023)    Hunger Vital Sign     Worried About Running Out of Food in the Last Year: Never true     Ran Out of Food in the Last Year: Never true   Transportation Needs: No Transportation Needs (10/9/2023)    PRAPARE - Transportation     Lack of Transportation (Medical): No     Lack of Transportation (Non-Medical): No   Physical Activity: Unknown (10/9/2023)    Exercise Vital Sign     Days of Exercise per Week: Patient declined   Stress: Patient Declined (10/9/2023)     [facility] of Occupational Health - Occupational Stress Questionnaire     Feeling of Stress : Patient declined   Social Connections: Unknown (10/9/2023)    Social Connection and Isolation Panel [NHANES]     Frequency of Communication with Friends and Family: More than three times a week     Frequency of Social Gatherings with Friends and Family: Patient declined     Active Member of Clubs or Organizations: No     Attends Club or Organization Meetings: Patient declined     Marital Status:    Housing Stability: Low Risk  (10/9/2023)    Housing Stability Vital Sign     Unable to Pay for Housing in the Last Year: No     Number of Places Lived in the Last Year: 1     Unstable Housing in the Last Year: No     Past Surgical History:   Procedure Laterality Date    CATARACT EXTRACTION BILATERAL W/ ANTERIOR VITRECTOMY      DILATION AND CURETTAGE OF UTERUS      x2     Family History   Problem Relation Age of Onset    Thyroid disease Father     Thyroid disease Son     Thyroid disease Daughter     Diabetes Neg Hx     Breast cancer Neg Hx     Colon cancer Neg Hx     Ovarian cancer Neg Hx            Review of Systems  General: negative for chills, fever or weight loss  Psychological: negative for mood changes or depression  Ophthalmic: negative for blurry vision, photophobia or eye pain  ENT: see HPI  Respiratory: no cough, shortness of breath, or wheezing  Cardiovascular: no chest pain or dyspnea on exertion  Gastrointestinal: no abdominal pain, change in bowel habits, or black/ bloody stools  Musculoskeletal: negative for gait disturbance or muscular weakness  Neurological: no syncope or seizures; no ataxia  Dermatological: negative for pruritis,  rash and jaundice  Hemotologic/Lymphatic: no new adenopathy, no easy bruising or bleeding      Physical Exam:    Vitals:    03/21/24 1333   BP: (!) 144/76   Pulse: 81       Constitutional: Well appearing / communicating without difficutly.  NAD.  Eyes: EOM I  Bilaterally  Head/Face: Normocephalic.  Negative paranasal sinus pressure/tenderness.  Salivary glands WNL.  House Brackmann I Bilaterally.    Right Ear: Auricle WNL.  EAC with thick, purulent drainage and mild edema,TM with perforation (25%).   Left Ear: Auricle WNL. EAC within normal limits no lesions or masses,TM w/o masses/lesions/perforations. TM mobility noted.  Nose: No gross nasal septal deviation. Inferior Turbinates 3+ bilaterally. No septal perforation. No masses/lesions. External nasal skin appears normal without masses/lesions.  Oral Cavity: Gingiva/lips within normal limits.  Dentition/gingiva healthy appearing. Mucus membranes moist. Floor of mouth soft, no masses palpated. Oral Tongue mobile. Hard Palate appears normal.    Oropharynx: Base of tongue appears normal. No masses/lesions noted. Tonsillar fossa/pharyngeal wall without lesions. Posterior oropharynx WNL.  Soft palate without masses. Midline uvula.   Neck/Lymphatic: No LAD I-VI bilaterally.  No thyromegaly.  No masses noted on exam.    Mirror laryngoscopy/nasopharyngoscopy: Active gag reflex.  Unable to perform.    Neuro/Psychiatric: AOx3.  Normal mood and affect.   Cardiovascular: Normal carotid pulses bilaterally, no increasing jugular venous distention noted at cervical region bilaterally.    Respiratory: Normal respiratory effort, no stridor, no retractions noted.    Debridement    Date/Time: 3/21/2024 1:40 PM    Performed by: Martina Knight NP  Authorized by: Martina Knight NP    Consent Done?:  Yes (Verbal)  Local anesthesia used?: No      Debridement - 1st Wound - General Location: right ear.       Length (cm):  1       Area (sq cm):  1       Width (cm):  1       Percent Debrided (%):  100       Depth (cm):  1       Total Area Debrided (sq cm):  1    Depth of debridement:  Epidermis/Dermis    Devitalized tissue debrided:  Exudate    Instruments:  Other (suction)  Bleeding:  None  Patient tolerance:  Patient tolerated the  procedure well with no immediate complications  Specimen Collected: Specimen sent to microbiology     Copious amount of thick, purulent drainage in the right ear canal           Assessment:    ICD-10-CM ICD-9-CM    1. Otitis externa of right ear, unspecified chronicity, unspecified type  H60.91 380.10 Aerobic culture      Debridement      2. Perforation of tympanic membrane, traumatic, right, sequela  S09.21XS 906.0      E989       3. Wears hearing aid in both ears  Z97.4 [id number]           The primary encounter diagnosis was Otitis externa of right ear, unspecified chronicity, unspecified type. Diagnoses of Perforation of tympanic membrane, traumatic, right, sequela and Wears hearing aid in both ears were also pertinent to this visit.      Plan:  Orders Placed This Encounter   Procedures    Debridement    Aerobic culture     -obtained right ear culture  -start on clotrimazole apply in right ear bid x 14 days  -start on Pred Forte apply 2 drops in the right ear x 10 days  -follow up in 1 week for debridement of right ear canal if needed    Martina Knight NP        Answers submitted by the patient for this visit:  Review of Symptoms Questionnaire  (Submitted on 3/21/2024)  None of these: Yes  hearing loss: Yes  Ear infection(s)?: Yes  ear discharge: Yes  None of these : Yes  None of these: Yes  None of these : Yes  None of these: Yes  None of these: Yes  Muscle aches / pain?: Yes  None of these : Yes  None of these: Yes  None of these : Yes  None of these: Yes  None of these: Yes  None of these: Yes

## 2024-03-21 NOTE — PROCEDURES
Debridement    Date/Time: 3/21/2024 1:40 PM    Performed by: Martina Knight NP  Authorized by: Martina Knight NP    Consent Done?:  Yes (Verbal)  Local anesthesia used?: No      Debridement - 1st Wound - General Location: right ear.       Length (cm):  1       Area (sq cm):  1       Width (cm):  1       Percent Debrided (%):  100       Depth (cm):  1       Total Area Debrided (sq cm):  1    Depth of debridement:  Epidermis/Dermis    Devitalized tissue debrided:  Exudate    Instruments:  Other (suction)  Bleeding:  None  Patient tolerance:  Patient tolerated the procedure well with no immediate complications  Specimen Collected: Specimen sent to microbiology     Copious amount of thick, purulent drainage in the right ear canal

## 2024-03-22 NOTE — TELEPHONE ENCOUNTER
Her ear drainage was purulent and white drainage which I suspect to be a fungal infection. Clotrimazole is to be treated for fungal infection. Ofloxacin is for bacterial infection. Though the ear culture would give me a definitive answer. Ear drops are meant for external use which you can put in the ear canal.

## 2024-03-25 LAB
BACTERIA SPEC AEROBE CULT: ABNORMAL
BACTERIA SPEC AEROBE CULT: ABNORMAL

## 2024-03-26 ENCOUNTER — OFFICE VISIT (OUTPATIENT)
Dept: OTOLARYNGOLOGY | Facility: CLINIC | Age: 89
End: 2024-03-26
Payer: MEDICARE

## 2024-03-26 VITALS
DIASTOLIC BLOOD PRESSURE: 65 MMHG | HEART RATE: 77 BPM | WEIGHT: 161.38 LBS | HEIGHT: 69 IN | BODY MASS INDEX: 23.9 KG/M2 | SYSTOLIC BLOOD PRESSURE: 101 MMHG

## 2024-03-26 DIAGNOSIS — H60.91 OTITIS EXTERNA OF RIGHT EAR, UNSPECIFIED CHRONICITY, UNSPECIFIED TYPE: Primary | ICD-10-CM

## 2024-03-26 DIAGNOSIS — Z97.4 WEARS HEARING AID IN BOTH EARS: ICD-10-CM

## 2024-03-26 DIAGNOSIS — H90.A31 MIXED CONDUCTIVE AND SENSORINEURAL HEARING LOSS OF RIGHT EAR WITH RESTRICTED HEARING OF LEFT EAR: ICD-10-CM

## 2024-03-26 DIAGNOSIS — S09.21XS: ICD-10-CM

## 2024-03-26 PROCEDURE — 99213 OFFICE O/P EST LOW 20 MIN: CPT | Mod: PBBFAC,PN | Performed by: NURSE PRACTITIONER

## 2024-03-26 PROCEDURE — 99999 PR PBB SHADOW E&M-EST. PATIENT-LVL III: CPT | Mod: PBBFAC,,, | Performed by: NURSE PRACTITIONER

## 2024-03-26 PROCEDURE — 99213 OFFICE O/P EST LOW 20 MIN: CPT | Mod: S$PBB,,, | Performed by: NURSE PRACTITIONER

## 2024-03-26 NOTE — PROGRESS NOTES
Chief Complaint   Patient presents with    Hearing Loss     F/u    Follow-up   .     HPI 5/1/2023: Chloe Foster is a 89 y.o. female who is referred to me by Dr. Denise Guzman* in consultation for possible ear infection. Son notices patient picking at her ears. Patient denies pain and itchiness. Chloe has had approximately numerous episodes of otitis externa in the past several years. The infections typically affect the right ear and are typically manifested by ear drainage.  The last ear infection was 2 months ago.  Her nasal symptoms consist of clear rhinorrhea.      She was started on ofloxacin and Pred Forte drops on 4/26/2023 for right ear drainage. She went for hearing aid fitting on 4/28/2023 and audiologist noted drainage in her right ear.     Interval HPI 5/17/2023:  Follow up visit. She is here with her daughter today. States she is doing well. Denies ear pain and drainage. She recently had new hearing aids and is hearing better. No complaints today. Ear culture on 5/1/2023 was positive for aspergillus flavus. She has completed clotrimazole otic drops x 14 days.     Interval HPI 10/31/2023:  Follow up visit.  She had seen Michelle Leo, audiology, on 10/24/23 for hearing aid check and repair. Mrs. Leo noticed redness and irritation in her right ear after cerumen removal.  The patient is here to evaluate for possible ear infection. She has a hx of right perforated TM. Her son states that he has started ofloxacin and Pred Forte from previous ear infection and has started applying the drops in her right ear twice a day for 5 days now. Denies ear pain and drainage.     Interval HPI 3/21/2024:  Follow up visit. She is here today with her daughter and son-in-law. Per her daughter, she has been having drainage and flaky skin in the right ear. The patient's son has started on the ear drops a couple days ago. She denies ear pain. She has a history of hearing loss and is wearing bilateral hearing  aids.     Interval HPI 3/26/2024:  Follow up visit. Ms. Corona is here today with her son. She has no complaints today. Denies pain and drainage. Ear culture was positive for candida parapsilosis and aspergillus flavus. She has been using the clotrimazole and Pred Forte otic drops.       Past Medical History:   Diagnosis Date    Anxiety     Black eye 5/6/2013    Dementia without behavioral disturbance 4/15/2021    Dyslipidemia     Essential hypertension 10/10/2022    Hyperthyroidism     IGT (impaired glucose tolerance)     Mitral valve prolapse     Nontoxic multinodular goiter 2/18/2013    Primary hypothyroidism 5/18/2015    Shingles     left side of chest and back    Thyroid nodule 2/18/2013    Urinary tract infection      Social History     Socioeconomic History    Marital status:    Occupational History    Occupation: Retired   Tobacco Use    Smoking status: Never    Smokeless tobacco: Never   Substance and Sexual Activity    Alcohol use: No     Alcohol/week: 0.0 standard drinks of alcohol    Drug use: No    Sexual activity: Never   Social History Narrative    Her  passed away in March 2014. She lives with her  and has sitters and a son who helps. She has four children two of whom are twins. Her son is running the family business. Her children are all educated. She worked doing  for the [employer]. She has an education degree and is a retired . She started a travel agency for 15 years. She is a non-smoker and denies alcohol or substance abuse.                      Social Determinants of Health     Financial Resource Strain: Low Risk  (3/24/2024)    Overall Financial Resource Strain (CARDIA)     Difficulty of Paying Living Expenses: Not hard at all   Food Insecurity: No Food Insecurity (3/24/2024)    Hunger Vital Sign     Worried About Running Out of Food in the Last Year: Never true     Ran Out of Food in the Last Year: Never true   Transportation Needs: No  Transportation Needs (3/24/2024)    PRAPARE - Transportation     Lack of Transportation (Medical): No     Lack of Transportation (Non-Medical): No   Physical Activity: Inactive (3/24/2024)    Exercise Vital Sign     Days of Exercise per Week: 0 days     Minutes of Exercise per Session: 0 min   Stress: Patient Declined (3/24/2024)    [employer] [facility] [employer] - Occupational Stress Questionnaire     Feeling of Stress : Patient declined   Social Connections: Unknown (3/24/2024)    Social Connection and Isolation Panel [NHANES]     Frequency of Communication with Friends and Family: More than three times a week     Frequency of Social Gatherings with Friends and Family: Patient declined     Active Member of Clubs or Organizations: Patient declined     Attends Club or Organization Meetings: Patient declined     Marital Status:    Housing Stability: Low Risk  (3/24/2024)    Housing Stability Vital Sign     Unable to Pay for Housing in the Last Year: No     Number of Places Lived in the Last Year: 1     Unstable Housing in the Last Year: No     Past Surgical History:   Procedure Laterality Date    CATARACT EXTRACTION BILATERAL W/ ANTERIOR VITRECTOMY      DILATION AND CURETTAGE OF UTERUS      x2     Family History   Problem Relation Age of Onset    Thyroid disease Father     Thyroid disease Son     Thyroid disease Daughter     Diabetes Neg Hx     Breast cancer Neg Hx     Colon cancer Neg Hx     Ovarian cancer Neg Hx            Review of Systems  General: negative for chills, fever or weight loss  Psychological: negative for mood changes or depression  Ophthalmic: negative for blurry vision, photophobia or eye pain  ENT: see HPI  Respiratory: no cough, shortness of breath, or wheezing  Cardiovascular: no chest pain or dyspnea on exertion  Gastrointestinal: no abdominal pain, change in bowel habits, or black/ bloody stools  Musculoskeletal: negative for gait disturbance or muscular  weakness  Neurological: no syncope or seizures; no ataxia  Dermatological: negative for pruritis,  rash and jaundice  Hemotologic/Lymphatic: no new adenopathy, no easy bruising or bleeding      Physical Exam:    Vitals:    03/26/24 1312   BP: 101/65   Pulse: 77         Constitutional: Well appearing / communicating without difficutly.  NAD.  Eyes: EOM I Bilaterally  Head/Face: Normocephalic.  Negative paranasal sinus pressure/tenderness.  Salivary glands WNL.  House Brackmann I Bilaterally.    Right Ear: Auricle WNL. EAC within normal limits no drainage, edema, erythema, lesions or masses,TM with dry perforation (25%).   Left Ear: Auricle WNL. EAC within normal limits no lesions or masses,TM w/o masses/lesions/perforations. TM mobility noted.  Nose: No gross nasal septal deviation. Inferior Turbinates 3+ bilaterally. No septal perforation. No masses/lesions. External nasal skin appears normal without masses/lesions.  Oral Cavity: Gingiva/lips within normal limits.  Dentition/gingiva healthy appearing. Mucus membranes moist. Floor of mouth soft, no masses palpated. Oral Tongue mobile. Hard Palate appears normal.    Oropharynx: Base of tongue appears normal. No masses/lesions noted. Tonsillar fossa/pharyngeal wall without lesions. Posterior oropharynx WNL.  Soft palate without masses. Midline uvula.   Neck/Lymphatic: No LAD I-VI bilaterally.  No thyromegaly.  No masses noted on exam.    Mirror laryngoscopy/nasopharyngoscopy: Active gag reflex.  Unable to perform.    Neuro/Psychiatric: AOx3.  Normal mood and affect.   Cardiovascular: Normal carotid pulses bilaterally, no increasing jugular venous distention noted at cervical region bilaterally.    Respiratory: Normal respiratory effort, no stridor, no retractions noted.              Assessment:    ICD-10-CM ICD-9-CM    1. Otitis externa of right ear, unspecified chronicity, unspecified type  H60.91 380.10       2. Wears hearing aid in both ears  Z97.4 [id number]       3.  Mixed conductive and sensorineural hearing loss of right ear with restricted hearing of left ear  H90.A31 389.22       4. Perforation of tympanic membrane, traumatic, right, sequela  S09.21XS 906.0      E989             The primary encounter diagnosis was Otitis externa of right ear, unspecified chronicity, unspecified type. Diagnoses of Wears hearing aid in both ears, Mixed conductive and sensorineural hearing loss of right ear with restricted hearing of left ear, and Perforation of tympanic membrane, traumatic, right, sequela were also pertinent to this visit.      Plan:  No orders of the defined types were placed in this encounter.    -continue on clotrimazole apply in right ear bid for total of 14 days  -can stop Pred Forte   - If the ear feels wet, use a hairdryer on a cool setting to dry the ears.    -she wears bilateral hearing aids.   -f/u 1 year or sooner as needed    Martina Knight NP      Answers submitted by the patient for this visit:  Review of Symptoms Questionnaire  (Submitted on 3/24/2024)  None of these: Yes  hearing loss: Yes  Ear infection(s)?: Yes  ear discharge: Yes  None of these : Yes  None of these: Yes  None of these : Yes  None of these: Yes  None of these: Yes  back pain: Yes  None of these : Yes  None of these: Yes  None of these : Yes  dizziness: Yes  None of these: Yes  None of these: Yes

## 2024-03-27 ENCOUNTER — OFFICE VISIT (OUTPATIENT)
Dept: FAMILY MEDICINE | Facility: CLINIC | Age: 89
End: 2024-03-27
Payer: MEDICARE

## 2024-03-27 VITALS
BODY MASS INDEX: 24.16 KG/M2 | SYSTOLIC BLOOD PRESSURE: 104 MMHG | HEART RATE: 85 BPM | HEIGHT: 69 IN | TEMPERATURE: 98 F | WEIGHT: 163.13 LBS | DIASTOLIC BLOOD PRESSURE: 60 MMHG | OXYGEN SATURATION: 97 %

## 2024-03-27 DIAGNOSIS — R54 AGE-RELATED PHYSICAL DEBILITY: Primary | ICD-10-CM

## 2024-03-27 DIAGNOSIS — E03.9 ACQUIRED HYPOTHYROIDISM: ICD-10-CM

## 2024-03-27 DIAGNOSIS — F02.80 LATE ONSET ALZHEIMER'S DEMENTIA WITHOUT BEHAVIORAL DISTURBANCE: ICD-10-CM

## 2024-03-27 DIAGNOSIS — M17.12 PRIMARY OSTEOARTHRITIS OF LEFT KNEE: ICD-10-CM

## 2024-03-27 DIAGNOSIS — I10 ESSENTIAL HYPERTENSION: ICD-10-CM

## 2024-03-27 DIAGNOSIS — G30.1 LATE ONSET ALZHEIMER'S DEMENTIA WITHOUT BEHAVIORAL DISTURBANCE: ICD-10-CM

## 2024-03-27 PROBLEM — N18.31 STAGE 3A CHRONIC KIDNEY DISEASE: Status: RESOLVED | Noted: 2023-04-10 | Resolved: 2024-03-27

## 2024-03-27 PROCEDURE — 99214 OFFICE O/P EST MOD 30 MIN: CPT | Mod: PBBFAC,PN | Performed by: STUDENT IN AN ORGANIZED HEALTH CARE EDUCATION/TRAINING PROGRAM

## 2024-03-27 PROCEDURE — 99999 PR PBB SHADOW E&M-EST. PATIENT-LVL IV: CPT | Mod: PBBFAC,,, | Performed by: STUDENT IN AN ORGANIZED HEALTH CARE EDUCATION/TRAINING PROGRAM

## 2024-03-27 PROCEDURE — 99215 OFFICE O/P EST HI 40 MIN: CPT | Mod: S$PBB,,, | Performed by: STUDENT IN AN ORGANIZED HEALTH CARE EDUCATION/TRAINING PROGRAM

## 2024-03-27 NOTE — PROGRESS NOTES
Subjective:       Patient ID: Chloe Foster is a 89 y.o. female.    Chief Complaint: Follow-up (Pt here 6 month f/u) and Fatigue (For the past 6 months she has had more fatigue and feeling weak)    HPI    89 yo pleasant Female with dementia (mildly dependent on family member(son) on ADLs, sHTN, HLD, hypothyroidism , stage 3a CKD and MVP for f/u on chronic conditions. She was accompanied in office by her son today, endorses mild left knee pain which has been evaluated by ortho already s/p steroid shots and her strength is also progressively getting worse since stopping PT. Her appetite is great and has regular BM. She described her mood to be great as well today.     Review of Systems - Negative except per HPI    Past Medical History:   Diagnosis Date    Anxiety     Black eye 5/6/2013    Dementia without behavioral disturbance 4/15/2021    Dyslipidemia     Essential hypertension 10/10/2022    Hyperthyroidism     IGT (impaired glucose tolerance)     Mitral valve prolapse     Nontoxic multinodular goiter 2/18/2013    Primary hypothyroidism 5/18/2015    Shingles     left side of chest and back    Thyroid nodule 2/18/2013    Urinary tract infection        Past Surgical History:   Procedure Laterality Date    CATARACT EXTRACTION BILATERAL W/ ANTERIOR VITRECTOMY      DILATION AND CURETTAGE OF UTERUS      x2       Family History   Problem Relation Age of Onset    Thyroid disease Father     Thyroid disease Son     Thyroid disease Daughter     Diabetes Neg Hx     Breast cancer Neg Hx     Colon cancer Neg Hx     Ovarian cancer Neg Hx        Social History     Socioeconomic History    Marital status:    Occupational History    Occupation: Retired   Tobacco Use    Smoking status: Never    Smokeless tobacco: Never   Substance and Sexual Activity    Alcohol use: No     Alcohol/week: 0.0 standard drinks of alcohol    Drug use: No    Sexual activity: Never   Social History Narrative    Her  passed away in March  2014. She lives with her  and has sitters and a son who helps. She has four children two of whom are twins. Her son is running the family business. Her children are all educated. She worked doing  for the [employer]. She has an education degree and is a retired . She started a travel agency for 15 years. She is a non-smoker and denies alcohol or substance abuse.                      Social Determinants of Health     Financial Resource Strain: Low Risk  (3/24/2024)    Overall Financial Resource Strain (CARDIA)     Difficulty of Paying Living Expenses: Not hard at all   Food Insecurity: No Food Insecurity (3/24/2024)    Hunger Vital Sign     Worried About Running Out of Food in the Last Year: Never true     Ran Out of Food in the Last Year: Never true   Transportation Needs: No Transportation Needs (3/24/2024)    PRAPARE - Transportation     Lack of Transportation (Medical): No     Lack of Transportation (Non-Medical): No   Physical Activity: Inactive (3/24/2024)    Exercise Vital Sign     Days of Exercise per Week: 0 days     Minutes of Exercise per Session: 0 min   Stress: Patient Declined (3/24/2024)    [employer] [place] [employer] - Occupational Stress Questionnaire     Feeling of Stress : Patient declined   Social Connections: Unknown (3/24/2024)    Social Connection and Isolation Panel [NHANES]     Frequency of Communication with Friends and Family: More than three times a week     Frequency of Social Gatherings with Friends and Family: Patient declined     Active Member of Clubs or Organizations: Patient declined     Attends Club or Organization Meetings: Patient declined     Marital Status:    Housing Stability: Low Risk  (3/24/2024)    Housing Stability Vital Sign     Unable to Pay for Housing in the Last Year: No     Number of Places Lived in the Last Year: 1     Unstable Housing in the Last Year: No         Objective:     Vitals:    03/27/24 1035   BP:  "104/60   BP Location: Right arm   Patient Position: Sitting   BP Method: Small (Manual)   Pulse: 85   Temp: 97.7 °F (36.5 °C)   TempSrc: Temporal   SpO2: 97%   Weight: 74 kg (163 lb 2.3 oz)   Height: 5' 9" (1.753 m)          Physical Exam  Vitals reviewed.   Constitutional:       Appearance: Normal appearance. She is normal weight.   HENT:      Mouth/Throat:      Mouth: Mucous membranes are moist.      Pharynx: Oropharynx is clear.   Eyes:      Extraocular Movements: Extraocular movements intact.      Conjunctiva/sclera: Conjunctivae normal.      Pupils: Pupils are equal, round, and reactive to light.   Cardiovascular:      Rate and Rhythm: Normal rate and regular rhythm.      Heart sounds: Normal heart sounds. Murmur: opening snap.   Pulmonary:      Effort: Pulmonary effort is normal.      Breath sounds: Normal breath sounds. No stridor. No wheezing or rales.   Abdominal:      General: Abdomen is flat. Bowel sounds are normal.      Palpations: Abdomen is soft.   Musculoskeletal:      Right lower leg: No edema.      Left lower leg: No edema.   Skin:     General: Skin is warm.      Coloration: Skin is not jaundiced.      Findings: No bruising.   Neurological:      General: No focal deficit present.      Mental Status: She is alert.      Gait: Gait normal.           Laboratory:  CBC:  No results for input(s): "WBC", "RBC", "HGB", "HCT", "PLT", "MCV", "MCH", "MCHC" in the last 2160 hours.    CMP:  No results for input(s): "GLU", "CALCIUM", "ALBUMIN", "PROT", "NA", "K", "CO2", "CL", "BUN", "ALKPHOS", "ALT", "AST", "BILITOT" in the last 2160 hours.    Invalid input(s): "CREATININ"    URINALYSIS:  No results for input(s): "COLORU", "CLARITYU", "SPECGRAV", "PHUR", "PROTEINUA", "GLUCOSEU", "BILIRUBINCON", "BLOODU", "WBCU", "RBCU", "BACTERIA", "MUCUS", "NITRITE", "LEUKOCYTESUR", "UROBILINOGEN", "HYALINECASTS" in the last 2160 hours.   LIPIDS:  No results for input(s): "TSH", "HDL", "CHOL", "TRIG", "LDLCALC", "CHOLHDL", " ""NONHDLCHOL", "TOTALCHOLEST" in the last 2160 hours.    TSH:  No results for input(s): "TSH" in the last 2160 hours.    A1C:  No results for input(s): "HGBA1C" in the last 2160 hours.    Assessment:         ICD-10-CM ICD-9-CM   1. Age-related physical debility  R54 797   2. Primary osteoarthritis of left knee  M17.12 715.16   3. Essential hypertension  I10 401.9   4. Acquired hypothyroidism  E03.9 244.9   5. Late onset Alzheimer's dementia without behavioral disturbance  G30.1 331.0    F02.80 294.10       Plan:       OA , left knee  -s/p XR left knee suggestive  -s/p ortho eval  -c/w tylenol PRN  -PT ordered    Age related physical therapy  -recertify for home with PT ordered     Late onset Alzheimer's dementia without behavioral disturbance  -except intermittent bouts of anxiety which is easily corrected with redirection  -watchful waiting for now  -c/w zoloft    Hypothyroidism (acquired)  -due for repeat TFT, ordered  -c/w 75mcg for now    CKD stage 3a  -resolved  -c/w adequate hydration    sHTN  -with intermittent hypotension alternating hypertension(labile)  -likely autonomic dysfunction  -currently off all antihypertensives  -BP well controlled in office today  -watchful waiting for now      Patient's Medications   New Prescriptions    No medications on file   Previous Medications    ANTIOX#10/OM3/DHA/EPA/LUT/ZEAX (I-CAPS ORAL)    Take 2 capsules by mouth once daily.    CALCIUM-VITAMIN D (OSCAL) 250 (625)-125 MG-UNIT PER TABLET    Take 1 tablet by mouth 2 (two) times daily.    CLOTRIMAZOLE (LOTRIMIN) 1 % SOLN    Apply to right ear twice daily x 14 days    DICLOFENAC SODIUM (VOLTAREN) 1 % GEL    Apply 2 g topically 2 (two) times daily.    DONEPEZIL (ARICEPT) 5 MG TABLET    Take 1 tablet (5 mg total) by mouth once daily.    FLUOCINOLONE ACETONIDE OIL (DERMOTIC OIL) 0.01 % DROP    Place 3 drops in ear(s) 2 (two) times daily.    LEVOTHYROXINE (SYNTHROID) 75 MCG TABLET    TAKE 1 TABLET (75 MCG TOTAL) BY MOUTH BEFORE " BREAKFAST.    MULTIVIT-IRON-FA-CALCIUM-MINS 18 MG IRON-400 MCG-500 MG CA TAB    Take 1 tablet by mouth once daily.    PREDNISOLONE ACETATE (PRED FORTE) 1 % DRPS    Apply 2 drops  to affected ear BID x 10 days    SERTRALINE (ZOLOFT) 100 MG TABLET    TAKE 1 TABLET (100 MG TOTAL) BY MOUTH ONCE DAILY.   Modified Medications    No medications on file   Discontinued Medications    No medications on file       Patient and son were given opportunity to express concerns, ask questions and verbalizes understanding of current management plan.    Dr Denise Naqvi MD  Internal Medicine , [facility]

## 2024-04-14 ENCOUNTER — PATIENT MESSAGE (OUTPATIENT)
Dept: FAMILY MEDICINE | Facility: CLINIC | Age: 89
End: 2024-04-14
Payer: MEDICARE

## 2024-04-15 ENCOUNTER — OFFICE VISIT (OUTPATIENT)
Dept: FAMILY MEDICINE | Facility: CLINIC | Age: 89
End: 2024-04-15
Payer: MEDICARE

## 2024-04-15 VITALS
HEART RATE: 73 BPM | SYSTOLIC BLOOD PRESSURE: 98 MMHG | OXYGEN SATURATION: 97 % | TEMPERATURE: 98 F | HEIGHT: 69 IN | DIASTOLIC BLOOD PRESSURE: 60 MMHG | WEIGHT: 161.94 LBS | BODY MASS INDEX: 23.98 KG/M2

## 2024-04-15 DIAGNOSIS — M25.531 PAIN AND SWELLING OF RIGHT WRIST: Primary | ICD-10-CM

## 2024-04-15 DIAGNOSIS — M17.12 PRIMARY OSTEOARTHRITIS OF LEFT KNEE: ICD-10-CM

## 2024-04-15 DIAGNOSIS — M25.431 PAIN AND SWELLING OF RIGHT WRIST: Primary | ICD-10-CM

## 2024-04-15 PROCEDURE — 96372 THER/PROPH/DIAG INJ SC/IM: CPT | Mod: PBBFAC,PN

## 2024-04-15 PROCEDURE — 99999 PR PBB SHADOW E&M-EST. PATIENT-LVL III: CPT | Mod: PBBFAC,,, | Performed by: STUDENT IN AN ORGANIZED HEALTH CARE EDUCATION/TRAINING PROGRAM

## 2024-04-15 PROCEDURE — 99214 OFFICE O/P EST MOD 30 MIN: CPT | Mod: S$PBB,,, | Performed by: STUDENT IN AN ORGANIZED HEALTH CARE EDUCATION/TRAINING PROGRAM

## 2024-04-15 PROCEDURE — 99213 OFFICE O/P EST LOW 20 MIN: CPT | Mod: PBBFAC,PN | Performed by: STUDENT IN AN ORGANIZED HEALTH CARE EDUCATION/TRAINING PROGRAM

## 2024-04-15 PROCEDURE — 99999PBSHW PR PBB SHADOW TECHNICAL ONLY FILED TO HB: Mod: PBBFAC,,,

## 2024-04-15 RX ORDER — KETOROLAC TROMETHAMINE 30 MG/ML
30 INJECTION, SOLUTION INTRAMUSCULAR; INTRAVENOUS
Status: COMPLETED | OUTPATIENT
Start: 2024-04-15 | End: 2024-04-15

## 2024-04-15 RX ORDER — MELOXICAM 15 MG/1
15 TABLET ORAL DAILY
Qty: 14 TABLET | Refills: 0 | Status: SHIPPED | OUTPATIENT
Start: 2024-04-15 | End: 2024-04-29

## 2024-04-15 RX ADMIN — KETOROLAC TROMETHAMINE 30 MG: 60 INJECTION, SOLUTION INTRAMUSCULAR at 04:04

## 2024-04-15 NOTE — PROGRESS NOTES
Subjective:       Patient ID: Chloe Foster is a 89 y.o. female.    Chief Complaint: Wrist Pain (Right wrist pain)    HPI    89 yo pleasant Female with dementia (mildly dependent on family member(son) on ADLs, sHTN, HLD, hypothyroidism , stage 3a CKD and MVP presents with sudden onset right wrist swelling and redness in the past 2-3 days, no fever, chills, fall or preceding trauma. She ambulates with her cane mostly with right hand.  was accompanied in office by her daughter today an LULA today.      Review of Systems - Negative except per HPI    Past Medical History:   Diagnosis Date    Anxiety     Black eye 5/6/2013    Dementia without behavioral disturbance 4/15/2021    Dyslipidemia     Essential hypertension 10/10/2022    Hyperthyroidism     IGT (impaired glucose tolerance)     Mitral valve prolapse     Nontoxic multinodular goiter 2/18/2013    Primary hypothyroidism 5/18/2015    Shingles     left side of chest and back    Thyroid nodule 2/18/2013    Urinary tract infection        Past Surgical History:   Procedure Laterality Date    CATARACT EXTRACTION BILATERAL W/ ANTERIOR VITRECTOMY      DILATION AND CURETTAGE OF UTERUS      x2       Family History   Problem Relation Name Age of Onset    Thyroid disease Father      Thyroid disease Son      Thyroid disease Daughter      Diabetes Neg Hx      Breast cancer Neg Hx      Colon cancer Neg Hx      Ovarian cancer Neg Hx         Social History     Socioeconomic History    Marital status:    Occupational History    Occupation: Retired   Tobacco Use    Smoking status: Never    Smokeless tobacco: Never   Substance and Sexual Activity    Alcohol use: No     Alcohol/week: 0.0 standard drinks of alcohol    Drug use: No    Sexual activity: Never   Social History Narrative    Her  passed away in March 2014. She lives with her  and has sitters and a son who helps. She has four children two of whom are twins. Her son is running the family business. Her  children are all educated. She worked doing  for the [employer]. She has an education degree and is a retired . She started a travel agency for 15 years. She is a non-smoker and denies alcohol or substance abuse.                      Social Determinants of Health     Financial Resource Strain: Low Risk  (3/24/2024)    Overall Financial Resource Strain (CARDIA)     Difficulty of Paying Living Expenses: Not hard at all   Food Insecurity: No Food Insecurity (3/24/2024)    Hunger Vital Sign     Worried About Running Out of Food in the Last Year: Never true     Ran Out of Food in the Last Year: Never true   Transportation Needs: No Transportation Needs (3/24/2024)    PRAPARE - Transportation     Lack of Transportation (Medical): No     Lack of Transportation (Non-Medical): No   Physical Activity: Inactive (3/24/2024)    Exercise Vital Sign     Days of Exercise per Week: 0 days     Minutes of Exercise per Session: 0 min   Stress: Patient Declined (3/24/2024)    [employer] [place] [employer] - Occupational Stress Questionnaire     Feeling of Stress : Patient declined   Social Connections: Unknown (3/24/2024)    Social Connection and Isolation Panel [NHANES]     Frequency of Communication with Friends and Family: More than three times a week     Frequency of Social Gatherings with Friends and Family: Patient declined     Active Member of Clubs or Organizations: Patient declined     Attends Club or Organization Meetings: Patient declined     Marital Status:    Housing Stability: Low Risk  (3/24/2024)    Housing Stability Vital Sign     Unable to Pay for Housing in the Last Year: No     Number of Places Lived in the Last Year: 1     Unstable Housing in the Last Year: No         Objective:     Vitals:    04/15/24 1610   BP: 98/60   BP Location: Right arm   Patient Position: Sitting   BP Method: Small (Manual)   Pulse: 73   Temp: 97.9 °F (36.6 °C)   TempSrc: Temporal   SpO2: 97%  "  Weight: 73.5 kg (161 lb 14.9 oz)   Height: 5' 9" (1.753 m)          Physical Exam  Vitals reviewed.   Constitutional:       Appearance: Normal appearance. She is normal weight.   HENT:      Mouth/Throat:      Mouth: Mucous membranes are moist.      Pharynx: Oropharynx is clear.   Eyes:      Extraocular Movements: Extraocular movements intact.      Conjunctiva/sclera: Conjunctivae normal.      Pupils: Pupils are equal, round, and reactive to light.   Cardiovascular:      Rate and Rhythm: Normal rate and regular rhythm.      Heart sounds: Normal heart sounds. Murmur: opening snap.   Pulmonary:      Effort: Pulmonary effort is normal.      Breath sounds: Normal breath sounds. No stridor. No wheezing or rales.   Abdominal:      General: Abdomen is flat. Bowel sounds are normal.      Palpations: Abdomen is soft.   Musculoskeletal:      Right lower leg: No edema.      Left lower leg: No edema.   Skin:     General: Skin is warm.      Coloration: Skin is not jaundiced.      Findings: No bruising.   Neurological:      General: No focal deficit present.      Mental Status: She is alert.      Gait: Gait normal.           Laboratory:  CBC:  No results for input(s): "WBC", "RBC", "HGB", "HCT", "PLT", "MCV", "MCH", "MCHC" in the last 2160 hours.    CMP:  Recent Labs   Lab Result Units 03/27/24  1123   Glucose mg/dL 60*   Calcium mg/dL 9.7   Albumin g/dL 4.1   Total Protein g/dL 7.2   Sodium mmol/L 145   Potassium mmol/L 4.6   CO2 mmol/L 28   Chloride mmol/L 102   BUN mg/dL 34*   Alkaline Phosphatase U/L 76   ALT U/L 15   AST U/L 27   Total Bilirubin mg/dL 0.5       URINALYSIS:  No results for input(s): "COLORU", "CLARITYU", "SPECGRAV", "PHUR", "PROTEINUA", "GLUCOSEU", "BILIRUBINCON", "BLOODU", "WBCU", "RBCU", "BACTERIA", "MUCUS", "NITRITE", "LEUKOCYTESUR", "UROBILINOGEN", "HYALINECASTS" in the last 2160 hours.   LIPIDS:  Recent Labs   Lab Result Units 03/27/24  1123   TSH uIU/mL 2.420       TSH:  Recent Labs   Lab Result Units " "03/27/24  1123   TSH uIU/mL 2.420       A1C:  No results for input(s): "HGBA1C" in the last 2160 hours.    Assessment:         ICD-10-CM ICD-9-CM   1. Pain and swelling of right wrist  M25.531 719.43    M25.431 719.03   2. Primary osteoarthritis of left knee  M17.12 715.16       Plan:       Pain and swelling of right wrist  -likely ligamental sprain from (improper walker use) r/o fracture or dislocation.  -given severity of pain , IM toradol 30mg given in office today   -XR wrist right ordered  -start on meloxicam 15mg pending review of Xray  -RICE: rest, keep on wrist brace, elevate for now  -will order rolla    OA , left knee  -s/p XR left knee suggestive  -s/p ortho eval  -c/w tylenol PRN  -PT ordered    Age related physical therapy  -recertify for home with PT ordered   -orders for walker placed again  -The mobility limitation cannot be sufficiently resolved by the use of a cane. Patient's functional mobility deficit can be sufficiently resolved with the use of a rollator  - Patient's mobility limitation significantly impairs their ability to participate in one of more activities of daily living.   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 disturbance  -except intermittent bouts of anxiety which is easily corrected with redirection  -watchful waiting for now  -c/w zoloft    Hypothyroidism (acquired)  -due for repeat TFT, ordered  -c/w 75mcg for now    CKD stage 3a  -resolved  -c/w adequate hydration    sHTN  -with intermittent hypotension alternating hypertension(labile)  -likely autonomic dysfunction  -currently off all antihypertensives  -BP well controlled in office today  -watchful waiting for now      Patient's Medications   New Prescriptions    MELOXICAM (MOBIC) 15 MG TABLET    Take 1 tablet (15 mg total) by mouth " once daily. for 14 days   Previous Medications    ANTIOX#10/OM3/DHA/EPA/LUT/ZEAX (I-CAPS ORAL)    Take 2 capsules by mouth once daily.    CALCIUM-VITAMIN D (OSCAL) 250 (625)-125 MG-UNIT PER TABLET    Take 1 tablet by mouth 2 (two) times daily.    CLOTRIMAZOLE (LOTRIMIN) 1 % SOLN    Apply to right ear twice daily x 14 days    DONEPEZIL (ARICEPT) 5 MG TABLET    Take 1 tablet (5 mg total) by mouth once daily.    FLUOCINOLONE ACETONIDE OIL (DERMOTIC OIL) 0.01 % DROP    Place 3 drops in ear(s) 2 (two) times daily.    LEVOTHYROXINE (SYNTHROID) 75 MCG TABLET    TAKE 1 TABLET (75 MCG TOTAL) BY MOUTH BEFORE BREAKFAST.    MULTIVIT-IRON-FA-CALCIUM-MINS 18 MG IRON-400 MCG-500 MG CA TAB    Take 1 tablet by mouth once daily.    PREDNISOLONE ACETATE (PRED FORTE) 1 % DRPS    Apply 2 drops  to affected ear BID x 10 days    SERTRALINE (ZOLOFT) 100 MG TABLET    TAKE 1 TABLET (100 MG TOTAL) BY MOUTH ONCE DAILY.   Modified Medications    No medications on file   Discontinued Medications    DICLOFENAC SODIUM (VOLTAREN) 1 % GEL    Apply 2 g topically 2 (two) times daily.       Patient and son were given opportunity to express concerns, ask questions and verbalizes understanding of current management plan.    Dr Denise Naqvi MD  Internal Medicine , [facility]

## 2024-04-25 ENCOUNTER — PATIENT MESSAGE (OUTPATIENT)
Dept: FAMILY MEDICINE | Facility: CLINIC | Age: 89
End: 2024-04-25
Payer: MEDICARE

## 2024-04-25 DIAGNOSIS — M15.9 GENERALIZED OA: Primary | ICD-10-CM

## 2024-04-30 DIAGNOSIS — F02.80 LATE ONSET ALZHEIMER'S DEMENTIA WITHOUT BEHAVIORAL DISTURBANCE: ICD-10-CM

## 2024-04-30 DIAGNOSIS — R54 AGE-RELATED PHYSICAL DEBILITY: ICD-10-CM

## 2024-04-30 DIAGNOSIS — M15.9 GENERALIZED OA: Primary | ICD-10-CM

## 2024-04-30 DIAGNOSIS — G30.1 LATE ONSET ALZHEIMER'S DEMENTIA WITHOUT BEHAVIORAL DISTURBANCE: ICD-10-CM

## 2024-05-14 ENCOUNTER — PATIENT MESSAGE (OUTPATIENT)
Dept: FAMILY MEDICINE | Facility: CLINIC | Age: 89
End: 2024-05-14
Payer: MEDICARE

## 2024-05-16 ENCOUNTER — PATIENT MESSAGE (OUTPATIENT)
Dept: FAMILY MEDICINE | Facility: CLINIC | Age: 89
End: 2024-05-16
Payer: MEDICARE

## 2024-05-17 DIAGNOSIS — R54 AGE-RELATED PHYSICAL DEBILITY: Primary | ICD-10-CM

## 2024-05-25 ENCOUNTER — PATIENT MESSAGE (OUTPATIENT)
Dept: OTOLARYNGOLOGY | Facility: CLINIC | Age: 89
End: 2024-05-25
Payer: MEDICARE

## 2024-06-09 ENCOUNTER — PATIENT MESSAGE (OUTPATIENT)
Dept: FAMILY MEDICINE | Facility: CLINIC | Age: 89
End: 2024-06-09
Payer: MEDICARE

## 2024-06-13 ENCOUNTER — OFFICE VISIT (OUTPATIENT)
Dept: OTOLARYNGOLOGY | Facility: CLINIC | Age: 89
End: 2024-06-13
Payer: MEDICARE

## 2024-06-13 ENCOUNTER — CLINICAL SUPPORT (OUTPATIENT)
Dept: OTOLARYNGOLOGY | Facility: CLINIC | Age: 89
End: 2024-06-13
Payer: MEDICARE

## 2024-06-13 VITALS
HEART RATE: 75 BPM | DIASTOLIC BLOOD PRESSURE: 66 MMHG | BODY MASS INDEX: 23.33 KG/M2 | SYSTOLIC BLOOD PRESSURE: 102 MMHG | WEIGHT: 157.94 LBS

## 2024-06-13 DIAGNOSIS — H72.91 PERFORATION OF RIGHT TYMPANIC MEMBRANE: ICD-10-CM

## 2024-06-13 DIAGNOSIS — H90.3 SENSORINEURAL HEARING LOSS (SNHL) OF BOTH EARS: ICD-10-CM

## 2024-06-13 DIAGNOSIS — H61.21 IMPACTED CERUMEN OF RIGHT EAR: Primary | ICD-10-CM

## 2024-06-13 DIAGNOSIS — Z97.4 WEARS HEARING AID IN BOTH EARS: ICD-10-CM

## 2024-06-13 DIAGNOSIS — H90.A31 MIXED CONDUCTIVE AND SENSORINEURAL HEARING LOSS OF RIGHT EAR WITH RESTRICTED HEARING OF LEFT EAR: Primary | ICD-10-CM

## 2024-06-13 DIAGNOSIS — H90.A22 SENSORINEURAL HEARING LOSS (SNHL) OF LEFT EAR WITH RESTRICTED HEARING OF RIGHT EAR: ICD-10-CM

## 2024-06-13 PROCEDURE — 92557 COMPREHENSIVE HEARING TEST: CPT | Mod: S$GLB,,,

## 2024-06-13 PROCEDURE — 99213 OFFICE O/P EST LOW 20 MIN: CPT | Mod: S$GLB,,, | Performed by: NURSE PRACTITIONER

## 2024-06-13 PROCEDURE — 1159F MED LIST DOCD IN RCRD: CPT | Mod: CPTII,S$GLB,, | Performed by: NURSE PRACTITIONER

## 2024-06-13 PROCEDURE — 99999 PR PBB SHADOW E&M-EST. PATIENT-LVL III: CPT | Mod: PBBFAC,,, | Performed by: NURSE PRACTITIONER

## 2024-06-13 PROCEDURE — 99999 PR PBB SHADOW E&M-EST. PATIENT-LVL I: CPT | Mod: PBBFAC,,,

## 2024-06-13 PROCEDURE — 1160F RVW MEDS BY RX/DR IN RCRD: CPT | Mod: CPTII,S$GLB,, | Performed by: NURSE PRACTITIONER

## 2024-06-13 PROCEDURE — 92567 TYMPANOMETRY: CPT | Mod: S$GLB,,,

## 2024-06-13 NOTE — PROGRESS NOTES
Chief Complaint   Patient presents with    Cerumen Impaction   .     HPI 5/1/2023: Chloe Foster is a 89 y.o. female who is referred to me by Dr. Denise Guzman* in consultation for possible ear infection. Son notices patient picking at her ears. Patient denies pain and itchiness. Chloe has had approximately numerous episodes of otitis externa in the past several years. The infections typically affect the right ear and are typically manifested by ear drainage.  The last ear infection was 2 months ago.  Her nasal symptoms consist of clear rhinorrhea.      She was started on ofloxacin and Pred Forte drops on 4/26/2023 for right ear drainage. She went for hearing aid fitting on 4/28/2023 and audiologist noted drainage in her right ear.     Interval HPI 5/17/2023:  Follow up visit. She is here with her daughter today. States she is doing well. Denies ear pain and drainage. She recently had new hearing aids and is hearing better. No complaints today. Ear culture on 5/1/2023 was positive for aspergillus flavus. She has completed clotrimazole otic drops x 14 days.     Interval HPI 10/31/2023:  Follow up visit.  She had seen Michelle Leo, audiology, on 10/24/23 for hearing aid check and repair. Mrs. Leo noticed redness and irritation in her right ear after cerumen removal.  The patient is here to evaluate for possible ear infection. She has a hx of right perforated TM. Her son states that he has started ofloxacin and Pred Forte from previous ear infection and has started applying the drops in her right ear twice a day for 5 days now. Denies ear pain and drainage.     Interval HPI 3/21/2024:  Follow up visit. She is here today with her daughter and son-in-law. Per her daughter, she has been having drainage and flaky skin in the right ear. The patient's son has started on the ear drops a couple days ago. She denies ear pain. She has a history of hearing loss and is wearing bilateral hearing aids.     Interval  HPI 3/26/2024:  Follow up visit. Ms. Corona is here today with her son. She has no complaints today. Denies pain and drainage. Ear culture was positive for candida parapsilosis and aspergillus flavus. She has been using the clotrimazole and Pred Forte otic drops.     Interval HPI 6/13/2024:  She is here today with her daughter for a routine cerumen removal. Denies ear pain and drainage. Her daughter states that the patient is having more trouble with hearing. She wears bilateral hearing aids.       Past Medical History:   Diagnosis Date    Anxiety     Black eye 5/6/2013    Dementia without behavioral disturbance 4/15/2021    Dyslipidemia     Essential hypertension 10/10/2022    Hyperthyroidism     IGT (impaired glucose tolerance)     Mitral valve prolapse     Nontoxic multinodular goiter 2/18/2013    Primary hypothyroidism 5/18/2015    Shingles     left side of chest and back    Thyroid nodule 2/18/2013    Urinary tract infection      Social History     Socioeconomic History    Marital status:    Occupational History    Occupation: Retired   Tobacco Use    Smoking status: Never    Smokeless tobacco: Never   Substance and Sexual Activity    Alcohol use: No     Alcohol/week: 0.0 standard drinks of alcohol    Drug use: No    Sexual activity: Never   Social History Narrative    Her  passed away in March 2014. She lives with her  and has sitters and a son who helps. She has four children two of whom are twins. Her son is running the family business. Her children are all educated. She worked doing  for the [employer]. She has an education degree and is a retired . She started a travel agency for 15 years. She is a non-smoker and denies alcohol or substance abuse.                      Social Determinants of Health     Financial Resource Strain: Low Risk  (3/24/2024)    Overall Financial Resource Strain (CARDIA)     Difficulty of Paying Living Expenses: Not hard at all    Food Insecurity: No Food Insecurity (3/24/2024)    Hunger Vital Sign     Worried About Running Out of Food in the Last Year: Never true     Ran Out of Food in the Last Year: Never true   Transportation Needs: No Transportation Needs (3/24/2024)    PRAPARE - Transportation     Lack of Transportation (Medical): No     Lack of Transportation (Non-Medical): No   Physical Activity: Inactive (3/24/2024)    Exercise Vital Sign     Days of Exercise per Week: 0 days     Minutes of Exercise per Session: 0 min   Stress: Patient Declined (3/24/2024)    [employer] [place] [employer] - Occupational Stress Questionnaire     Feeling of Stress : Patient declined   Housing Stability: Low Risk  (3/24/2024)    Housing Stability Vital Sign     Unable to Pay for Housing in the Last Year: No     Number of Places Lived in the Last Year: 1     Unstable Housing in the Last Year: No     Past Surgical History:   Procedure Laterality Date    CATARACT EXTRACTION BILATERAL W/ ANTERIOR VITRECTOMY      DILATION AND CURETTAGE OF UTERUS      x2     Family History   Problem Relation Name Age of Onset    Thyroid disease Father      Thyroid disease Son      Thyroid disease Daughter      Diabetes Neg Hx      Breast cancer Neg Hx      Colon cancer Neg Hx      Ovarian cancer Neg Hx             Review of Systems  General: negative for chills, fever or weight loss  Psychological: negative for mood changes or depression  Ophthalmic: negative for blurry vision, photophobia or eye pain  ENT: see HPI  Respiratory: no cough, shortness of breath, or wheezing  Cardiovascular: no chest pain or dyspnea on exertion  Gastrointestinal: no abdominal pain, change in bowel habits, or black/ bloody stools  Musculoskeletal: negative for gait disturbance or muscular weakness  Neurological: no syncope or seizures; no ataxia  Dermatological: negative for pruritis,  rash and jaundice  Hemotologic/Lymphatic: no new adenopathy, no easy bruising or  bleeding      Physical Exam:    Vitals:    06/13/24 1346   BP: 102/66   Pulse: 75         Constitutional: Well appearing / communicating without difficutly.  NAD.  Eyes: EOM I Bilaterally  Head/Face: Normocephalic.  Negative paranasal sinus pressure/tenderness.  Salivary glands WNL.  House Brackmann I Bilaterally.    Right Ear: Auricle WNL. EAC with cerumen impaction. EAC within normal limits no drainage, edema, erythema, lesions or masses,TM with dry perforation (25%).   Left Ear: Auricle WNL. EAC within normal limits no lesions or masses,TM w/o masses/lesions/perforations. TM mobility noted.  Nose: No gross nasal septal deviation. Inferior Turbinates 3+ bilaterally. No septal perforation. No masses/lesions. External nasal skin appears normal without masses/lesions.  Oral Cavity: Gingiva/lips within normal limits.  Dentition/gingiva healthy appearing. Mucus membranes moist. Floor of mouth soft, no masses palpated. Oral Tongue mobile. Hard Palate appears normal.    Oropharynx: Base of tongue appears normal. No masses/lesions noted. Tonsillar fossa/pharyngeal wall without lesions. Posterior oropharynx WNL.  Soft palate without masses. Midline uvula.   Neck/Lymphatic: No LAD I-VI bilaterally.  No thyromegaly.  No masses noted on exam.    Mirror laryngoscopy/nasopharyngoscopy: Active gag reflex.  Unable to perform.    Neuro/Psychiatric: AOx3.  Normal mood and affect.   Cardiovascular: Normal carotid pulses bilaterally, no increasing jugular venous distention noted at cervical region bilaterally.    Respiratory: Normal respiratory effort, no stridor, no retractions noted.        Ear Cerumen Removal under microscopy    Date/Time: 6/13/2024 1:40 PM    Performed by: Martina Knight NP  Authorized by: Martina Knight NP    Consent Done?:  Yes (Verbal)    Local anesthetic:  None  Location details:  Right ear  Procedure type comment:  Suction  Cerumen  Removal Results:  Cerumen completely removed  Patient tolerance:   Patient tolerated the procedure well with no immediate complications        Audiogram interpreted personally by me and discussed in detail with the patient today.   Pure tone testing revealed moderate sloping to profound mixed hearing loss in the right ear and moderate to profound sensorineural hearing loss in the left ear. Speech reception thresholds were obtained at 60 dBHL in the right ear and 50 dBHL in the left ear. Speech discrimination scores were 72% in the right ear and 48% in the left ear. Tympanometry revealed Type Ad tympanogram (compliance2.46 ml) in the left ear. A seal could not be not be obtained for tympanometry in the right ear.             Assessment:    ICD-10-CM ICD-9-CM    1. Impacted cerumen of right ear  H61.21 380.4 Ear Cerumen Removal      2. Sensorineural hearing loss (SNHL) of both ears  H90.3 389.18       3. Wears hearing aid in both ears  Z97.4 [id number]       4. Perforation of right tympanic membrane  H72.91 384.20               The primary encounter diagnosis was Impacted cerumen of right ear. Diagnoses of Sensorineural hearing loss (SNHL) of both ears, Wears hearing aid in both ears, and Perforation of right tympanic membrane were also pertinent to this visit.      Plan:  Orders Placed This Encounter   Procedures    Ear Cerumen Removal     -Cerumen impaction:  Removed under microscopy today without difficulty.  I would recommend the use of a wax softening drop, either over the counter Debrox or mineral oil, on a weekly basis.  I also instructed the patient to avoid Qtips.  -continue wearing hearing aids  -follow up 1 year or sooner as needed      Martina Knight NP        Answers submitted by the patient for this visit:  Review of Symptoms Questionnaire  (Submitted on 6/11/2024)  Fatigue (Tiredness)?: Yes  hearing loss: Yes  Ear infection(s)?: Yes  ear discharge: Yes  None of these : Yes  None of these: Yes  None of these : Yes  None of these: Yes  None of these: Yes  Muscle aches /  pain?: Yes  back pain: Yes  None of these : Yes  None of these: Yes  None of these : Yes  dizziness: Yes  Light-headedness: Yes  None of these: Yes  nervous/ anxious: Yes

## 2024-06-13 NOTE — PROGRESS NOTES
Chloe Foster, a 89 y.o. female was seen today in the clinic for an audiologic evaluation after ear cleaning. The patient's primary complaint was reduced hearing perception with cerumen impaction.  Ms. Foster wears hearing aids binaurally.  She denies tinnitus, ear pain, and drainage.     Pure tone testing revealed moderate sloping to profound mixed hearing loss in the right ear and moderate to profound sensorineural hearing loss in the left ear. Speech reception thresholds were obtained at 60 dBHL in the right ear and 50 dBHL in the left ear. Speech discrimination scores were 72% in the right ear and 48% in the left ear. Tympanometry revealed Type Ad tympanogram (compliance2.46 ml) in the left ear. A seal could not be not be obtained for tympanometry in the right ear. Otoscopy revealed perforation in the right ear and was unremarkable in the left ear.    Results were reviewed with the patient and the recommendation of a hearing aid follow up as needed was discussed.    Recommendations:  Otologic evaluation  Annual audiologic evaluation  Hearing protection in noise  Hearing aid follow up as needed

## 2024-06-13 NOTE — PROCEDURES
Ear Cerumen Removal    Date/Time: 6/13/2024 1:40 PM    Performed by: Martina Knight NP  Authorized by: Martina Knight NP    Consent Done?:  Yes (Verbal)    Local anesthetic:  None  Location details:  Right ear  Procedure type comment:  Suction  Cerumen  Removal Results:  Cerumen completely removed  Patient tolerance:  Patient tolerated the procedure well with no immediate complications

## 2024-06-17 ENCOUNTER — OFFICE VISIT (OUTPATIENT)
Dept: FAMILY MEDICINE | Facility: CLINIC | Age: 89
End: 2024-06-17
Payer: MEDICARE

## 2024-06-17 ENCOUNTER — TELEPHONE (OUTPATIENT)
Dept: FAMILY MEDICINE | Facility: CLINIC | Age: 89
End: 2024-06-17

## 2024-06-17 VITALS
HEIGHT: 69 IN | OXYGEN SATURATION: 95 % | DIASTOLIC BLOOD PRESSURE: 62 MMHG | BODY MASS INDEX: 23.47 KG/M2 | SYSTOLIC BLOOD PRESSURE: 90 MMHG | WEIGHT: 158.5 LBS | HEART RATE: 88 BPM

## 2024-06-17 DIAGNOSIS — R63.0 ANOREXIA: ICD-10-CM

## 2024-06-17 DIAGNOSIS — R54 AGE-RELATED PHYSICAL DEBILITY: ICD-10-CM

## 2024-06-17 DIAGNOSIS — I10 ESSENTIAL HYPERTENSION: ICD-10-CM

## 2024-06-17 DIAGNOSIS — E03.9 ACQUIRED HYPOTHYROIDISM: ICD-10-CM

## 2024-06-17 DIAGNOSIS — R53.83 OTHER FATIGUE: ICD-10-CM

## 2024-06-17 DIAGNOSIS — N18.31 STAGE 3A CHRONIC KIDNEY DISEASE: ICD-10-CM

## 2024-06-17 PROCEDURE — 1159F MED LIST DOCD IN RCRD: CPT | Mod: CPTII,S$GLB,, | Performed by: STUDENT IN AN ORGANIZED HEALTH CARE EDUCATION/TRAINING PROGRAM

## 2024-06-17 PROCEDURE — 99999 PR PBB SHADOW E&M-EST. PATIENT-LVL IV: CPT | Mod: PBBFAC,,, | Performed by: STUDENT IN AN ORGANIZED HEALTH CARE EDUCATION/TRAINING PROGRAM

## 2024-06-17 PROCEDURE — 1126F AMNT PAIN NOTED NONE PRSNT: CPT | Mod: CPTII,S$GLB,, | Performed by: STUDENT IN AN ORGANIZED HEALTH CARE EDUCATION/TRAINING PROGRAM

## 2024-06-17 PROCEDURE — 1101F PT FALLS ASSESS-DOCD LE1/YR: CPT | Mod: CPTII,S$GLB,, | Performed by: STUDENT IN AN ORGANIZED HEALTH CARE EDUCATION/TRAINING PROGRAM

## 2024-06-17 PROCEDURE — 1160F RVW MEDS BY RX/DR IN RCRD: CPT | Mod: CPTII,S$GLB,, | Performed by: STUDENT IN AN ORGANIZED HEALTH CARE EDUCATION/TRAINING PROGRAM

## 2024-06-17 PROCEDURE — 3288F FALL RISK ASSESSMENT DOCD: CPT | Mod: CPTII,S$GLB,, | Performed by: STUDENT IN AN ORGANIZED HEALTH CARE EDUCATION/TRAINING PROGRAM

## 2024-06-17 PROCEDURE — 99215 OFFICE O/P EST HI 40 MIN: CPT | Mod: S$GLB,,, | Performed by: STUDENT IN AN ORGANIZED HEALTH CARE EDUCATION/TRAINING PROGRAM

## 2024-06-17 NOTE — PROGRESS NOTES
Subjective:       Patient ID: Chloe Foster is a 89 y.o. female.    Chief Complaint: Anorexia (Dropped 6 pounds in last 2 months ) and Fatigue (Started about 4 days go)    HPI    89 yo pleasant Female with dementia (mildly dependent on family member(son) on ADLs, sHTN, HLD, hypothyroidism , stage 3a CKD and MVP.    She was accompanied in office today by her daughter who c/o sudden onset poor appetite, progressive fatigue and diarrhea of 2-3 watery/semisolid stools in the past 4 days. No reports of sick contact, no eating outside home or changes in baseline diet, no fever, dysuria/malodorous urine, skin ulcers or rash.  Since onset of symptoms, they have been giving her pedialyte and just got ensure nutritional supplement for she which she is yet to take     Review of Systems - Negative except per HPI    Past Medical History:   Diagnosis Date    Anxiety     Black eye 5/6/2013    Dementia without behavioral disturbance 4/15/2021    Dyslipidemia     Essential hypertension 10/10/2022    Hyperthyroidism     IGT (impaired glucose tolerance)     Mitral valve prolapse     Nontoxic multinodular goiter 2/18/2013    Primary hypothyroidism 5/18/2015    Shingles     left side of chest and back    Thyroid nodule 2/18/2013    Urinary tract infection        Past Surgical History:   Procedure Laterality Date    CATARACT EXTRACTION BILATERAL W/ ANTERIOR VITRECTOMY      DILATION AND CURETTAGE OF UTERUS      x2       Family History   Problem Relation Name Age of Onset    Thyroid disease Father      Thyroid disease Son      Thyroid disease Daughter      Diabetes Neg Hx      Breast cancer Neg Hx      Colon cancer Neg Hx      Ovarian cancer Neg Hx         Social History     Socioeconomic History    Marital status:    Occupational History    Occupation: Retired   Tobacco Use    Smoking status: Never    Smokeless tobacco: Never   Substance and Sexual Activity    Alcohol use: No     Alcohol/week: 0.0 standard drinks of alcohol     "Drug use: No    Sexual activity: Never   Social History Narrative    Her  passed away in March 2014. She lives with her  and has sitters and a son who helps. She has four children two of whom are twins. Her son is running the family business. Her children are all educated. She worked doing  for the State [employer]. She has an education degree and is a retired . She started a travel agency for 15 years. She is a non-smoker and denies alcohol or substance abuse.                      Social Determinants of Health     Financial Resource Strain: Low Risk  (3/24/2024)    Overall Financial Resource Strain (CARDIA)     Difficulty of Paying Living Expenses: Not hard at all   Food Insecurity: No Food Insecurity (3/24/2024)    Hunger Vital Sign     Worried About Running Out of Food in the Last Year: Never true     Ran Out of Food in the Last Year: Never true   Transportation Needs: No Transportation Needs (3/24/2024)    PRAPARE - Transportation     Lack of Transportation (Medical): No     Lack of Transportation (Non-Medical): No   Physical Activity: Inactive (3/24/2024)    Exercise Vital Sign     Days of Exercise per Week: 0 days     Minutes of Exercise per Session: 0 min   Stress: Patient Declined (3/24/2024)    [employer] [place] [employer] - Occupational Stress Questionnaire     Feeling of Stress : Patient declined   Housing Stability: Low Risk  (3/24/2024)    Housing Stability Vital Sign     Unable to Pay for Housing in the Last Year: No     Number of Places Lived in the Last Year: 1     Unstable Housing in the Last Year: No         Objective:     Vitals:    06/17/24 1110   BP: 90/62   BP Location: Right arm   Patient Position: Sitting   BP Method: Large (Manual)   Pulse: 88   SpO2: 95%   Weight: 71.9 kg (158 lb 8.2 oz)   Height: 5' 9" (1.753 m)          Physical Exam  Vitals reviewed.   Constitutional:       Appearance: Normal appearance. She is normal weight.   HENT:      " "Right Ear: Tympanic membrane normal.      Left Ear: Tympanic membrane normal.      Mouth/Throat:      Mouth: Mucous membranes are moist.      Pharynx: Oropharynx is clear.   Eyes:      Extraocular Movements: Extraocular movements intact.      Conjunctiva/sclera: Conjunctivae normal.      Pupils: Pupils are equal, round, and reactive to light.   Cardiovascular:      Rate and Rhythm: Normal rate and regular rhythm.      Heart sounds: Normal heart sounds. Murmur: opening snap.   Pulmonary:      Effort: Pulmonary effort is normal.      Breath sounds: Normal breath sounds. No stridor. No wheezing or rales.   Abdominal:      General: Abdomen is flat. Bowel sounds are normal. There is no distension.      Palpations: Abdomen is soft.      Tenderness: There is no abdominal tenderness.   Musculoskeletal:      Right lower leg: No edema.      Left lower leg: No edema.   Skin:     General: Skin is warm.      Coloration: Skin is not jaundiced.      Findings: No bruising.   Neurological:      General: No focal deficit present.      Mental Status: She is alert.      Gait: Gait normal.           Laboratory:  CBC:  No results for input(s): "WBC", "RBC", "HGB", "HCT", "PLT", "MCV", "MCH", "MCHC" in the last 2160 hours.    CMP:  Recent Labs   Lab Result Units 03/27/24  1123   Glucose mg/dL 60*   Calcium mg/dL 9.7   Albumin g/dL 4.1   Total Protein g/dL 7.2   Sodium mmol/L 145   Potassium mmol/L 4.6   CO2 mmol/L 28   Chloride mmol/L 102   BUN mg/dL 34*   Alkaline Phosphatase U/L 76   ALT U/L 15   AST U/L 27   Total Bilirubin mg/dL 0.5       URINALYSIS:  No results for input(s): "COLORU", "CLARITYU", "SPECGRAV", "PHUR", "PROTEINUA", "GLUCOSEU", "BILIRUBINCON", "BLOODU", "WBCU", "RBCU", "BACTERIA", "MUCUS", "NITRITE", "LEUKOCYTESUR", "UROBILINOGEN", "HYALINECASTS" in the last 2160 hours.   LIPIDS:  Recent Labs   Lab Result Units 03/27/24  1123   TSH uIU/mL 2.420       TSH:  Recent Labs   Lab Result Units 03/27/24  1123   TSH uIU/mL 2.420 " "      A1C:  No results for input(s): "HGBA1C" in the last 2160 hours.    Assessment:         ICD-10-CM ICD-9-CM   1. Anorexia  R63.0 783.0   2. Other fatigue  R53.83 780.79   3. Age-related physical debility  R54 797   4. Acquired hypothyroidism  E03.9 244.9       Plan:       Anorexia   -possibly age related with progressive dementia  -no signs of infection  -will check CBC,CMP,TFT and urinalysis today  -okay to supplement diet with nutritional supplement( Ensure 120oz two times daily)     Other fatigue  -likely related to anorexia  -mgt as outlined above    OA , left knee  -s/p XR left knee suggestive  -s/p ortho eval  -c/w tylenol PRN  -PT ordered    Age related physical therapy  -recertify for home with PT ordered   -orders for walker placed again  -The mobility limitation cannot be sufficiently resolved by the use of a cane. Patient's functional mobility deficit can be sufficiently resolved with the use of a rollator  - Patient's mobility limitation significantly impairs their ability to participate in one of more activities of daily living.   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 disturbance  -except intermittent bouts of anxiety which is easily corrected with redirection  -watchful waiting for now  -c/w zoloft    Hypothyroidism (acquired)  -TFT WNL  -due for repeat level  -c/w 75mcg for now    CKD stage 3a  -stable renal function  -c/w adequate hydration    sHTN  -with intermittent hypotension alternating hypertension(labile)  -likely autonomic dysfunction  -currently off all antihypertensives  -BP well controlled in office today  -watchful waiting for now      Patient's Medications   New Prescriptions    No medications on file   Previous Medications    ANTIOX#10/OM3/DHA/EPA/LUT/ZEAX (I-CAPS ORAL)    Take 2 capsules " by mouth once daily.    CALCIUM-VITAMIN D (OSCAL) 250 (625)-125 MG-UNIT PER TABLET    Take 1 tablet by mouth 2 (two) times daily.    DONEPEZIL (ARICEPT) 5 MG TABLET    Take 1 tablet (5 mg total) by mouth once daily.    LEVOTHYROXINE (SYNTHROID) 75 MCG TABLET    TAKE 1 TABLET (75 MCG TOTAL) BY MOUTH BEFORE BREAKFAST.    MULTIVIT-IRON-FA-CALCIUM-MINS 18 MG IRON-400 MCG-500 MG CA TAB    Take 1 tablet by mouth once daily.    PREDNISOLONE ACETATE (PRED FORTE) 1 % DRPS    Apply 2 drops  to affected ear BID x 10 days    SERTRALINE (ZOLOFT) 100 MG TABLET    TAKE 1 TABLET (100 MG TOTAL) BY MOUTH ONCE DAILY.   Modified Medications    No medications on file   Discontinued Medications    CLOTRIMAZOLE (LOTRIMIN) 1 % SOLN    Apply to right ear twice daily x 14 days    FLUOCINOLONE ACETONIDE OIL (DERMOTIC OIL) 0.01 % DROP    Place 3 drops in ear(s) 2 (two) times daily.       Patient and son were given opportunity to express concerns, ask questions and verbalizes understanding of current management plan.    Dr Denise Naqvi MD  Internal Medicine , [facility]      Time spent: 41 minutes in face to face discussion concerning diagnosis, prognosis, review of lab and test results, ordering of new labs, benefits of treatment as well as management of disease, counseling of patient and coordination of care between various health care providers.  Greater than half the time spent was used for coordination of care and counseling of patient.          Answers submitted by the patient for this visit:  Review of Systems Questionnaire (Submitted on 6/16/2024)  activity change: Yes  unexpected weight change: Yes  neck pain: No  hearing loss: No  rhinorrhea: No  trouble swallowing: No  eye discharge: No  visual disturbance: No  chest tightness: No  wheezing: No  chest pain: No  palpitations: No  blood in stool: No  constipation: No  vomiting: No  diarrhea: No  polydipsia: No  polyuria: No  difficulty urinating: No  hematuria: No  menstrual  problem: No  dysuria: No  joint swelling: No  arthralgias: No  headaches: No  weakness: Yes  confusion: No  dysphoric mood: No

## 2024-06-17 NOTE — TELEPHONE ENCOUNTER
----- Message from Sharla Johann sent at 6/14/2024 12:06 PM CDT -----  Type:  Requesting Call    Who Called: pt's daughter  Does the patient know what this is regarding?: caller states they have been trying to resolve two issues since April, has not received a response, regarding walker provided to pt and the referral for rehab   Would the patient rather a call back or a response via [facility]? Call  Best Call Back Number: [phone] lianne Corona   Additional Information: caller will be in meeting until 1pm, please call after 1pm

## 2024-06-17 NOTE — TELEPHONE ENCOUNTER
Returned pt call. Spoke with pt daughter regarding HH order and Walker order. Informed pt we will fax over to DME so we can speed up this process

## 2024-06-18 ENCOUNTER — TELEPHONE (OUTPATIENT)
Dept: FAMILY MEDICINE | Facility: CLINIC | Age: 89
End: 2024-06-18
Payer: MEDICARE

## 2024-06-18 DIAGNOSIS — N30.00 ACUTE CYSTITIS WITHOUT HEMATURIA: Primary | ICD-10-CM

## 2024-06-18 DIAGNOSIS — N30.01 ACUTE CYSTITIS WITH HEMATURIA: ICD-10-CM

## 2024-06-18 RX ORDER — LEVOFLOXACIN 500 MG/1
500 TABLET, FILM COATED ORAL DAILY
Qty: 5 TABLET | Refills: 0 | Status: SHIPPED | OUTPATIENT
Start: 2024-06-18 | End: 2024-06-23

## 2024-06-18 NOTE — TELEPHONE ENCOUNTER
----- Message from Aspen Mtz sent at 6/18/2024 10:11 AM CDT -----  Contact: hung steinberg  Type:  Patient Returning Call    Who Called:pt  Who Left Message for Patient:renetta  Does the patient know what this is regarding?:test results  Would the patient rather a call back or a response via [facility]? call  Best Call Back Number:[phone]  Additional Information:

## 2024-06-26 PROCEDURE — G0180 MD CERTIFICATION HHA PATIENT: HCPCS | Mod: ,,, | Performed by: STUDENT IN AN ORGANIZED HEALTH CARE EDUCATION/TRAINING PROGRAM

## 2024-06-27 ENCOUNTER — PATIENT MESSAGE (OUTPATIENT)
Dept: FAMILY MEDICINE | Facility: CLINIC | Age: 89
End: 2024-06-27
Payer: MEDICARE

## 2024-07-04 ENCOUNTER — PATIENT MESSAGE (OUTPATIENT)
Dept: FAMILY MEDICINE | Facility: CLINIC | Age: 89
End: 2024-07-04
Payer: MEDICARE

## 2024-07-05 DIAGNOSIS — N30.01 ACUTE CYSTITIS WITH HEMATURIA: Primary | ICD-10-CM

## 2024-07-09 DIAGNOSIS — N18.31 STAGE 3A CHRONIC KIDNEY DISEASE: Primary | ICD-10-CM

## 2024-07-22 ENCOUNTER — PATIENT MESSAGE (OUTPATIENT)
Dept: OTOLARYNGOLOGY | Facility: CLINIC | Age: 89
End: 2024-07-22
Payer: MEDICARE

## 2024-07-22 ENCOUNTER — PATIENT MESSAGE (OUTPATIENT)
Dept: FAMILY MEDICINE | Facility: CLINIC | Age: 89
End: 2024-07-22
Payer: MEDICARE

## 2024-07-24 ENCOUNTER — TELEPHONE (OUTPATIENT)
Dept: FAMILY MEDICINE | Facility: CLINIC | Age: 89
End: 2024-07-24
Payer: MEDICARE

## 2024-07-24 NOTE — TELEPHONE ENCOUNTER
----- Message from Anna De Dios sent at 7/24/2024  1:11 PM CDT -----  Type:  Needs Medical Advice    Who Called: Pt's daughter, Chloe    Symptoms (please be specific): possible UTI, frequency and burning    Would the patient rather a call back or a response via [facility]? Call back   Best Call Back Number: [phone]  Additional Information: Please be advised, caller stated that they sent multiple emails regarding pt and hasn't received a response back yet, caller stated if dr can put in an order for a urinalysis for pt

## 2024-07-26 ENCOUNTER — OFFICE VISIT (OUTPATIENT)
Dept: OTOLARYNGOLOGY | Facility: CLINIC | Age: 89
End: 2024-07-26
Payer: MEDICARE

## 2024-07-26 VITALS
DIASTOLIC BLOOD PRESSURE: 69 MMHG | HEART RATE: 76 BPM | BODY MASS INDEX: 23.33 KG/M2 | SYSTOLIC BLOOD PRESSURE: 110 MMHG | WEIGHT: 157.94 LBS

## 2024-07-26 DIAGNOSIS — H61.21 IMPACTED CERUMEN OF RIGHT EAR: ICD-10-CM

## 2024-07-26 DIAGNOSIS — H60.91 OTITIS EXTERNA OF RIGHT EAR, UNSPECIFIED CHRONICITY, UNSPECIFIED TYPE: Primary | ICD-10-CM

## 2024-07-26 PROCEDURE — 99999 PR PBB SHADOW E&M-EST. PATIENT-LVL III: CPT | Mod: PBBFAC,,, | Performed by: NURSE PRACTITIONER

## 2024-07-26 PROCEDURE — 87070 CULTURE OTHR SPECIMN AEROBIC: CPT | Performed by: NURSE PRACTITIONER

## 2024-07-26 RX ORDER — CLOTRIMAZOLE 1 G/ML
SOLUTION TOPICAL
Qty: 10 ML | Refills: 0 | Status: SHIPPED | OUTPATIENT
Start: 2024-07-26

## 2024-07-26 NOTE — PROCEDURES
Ear Cerumen Removal    Date/Time: 7/26/2024 9:20 AM    Performed by: Martina Knight NP  Authorized by: Martina Knight NP    Consent Done?:  Yes (Verbal)    Local anesthetic:  None  Location details:  Right ear  Procedure type comment:  Suction  Cerumen  Removal Results:  Cerumen completely removed  Patient tolerance:  Patient tolerated the procedure well with no immediate complications

## 2024-07-26 NOTE — PROGRESS NOTES
Chief Complaint   Patient presents with    Ear Drainage     Right ear    .     HPI 5/1/2023: Chloe Foster is a 89 y.o. female who is referred to me by Dr. Denise Guzman* in consultation for possible ear infection. Son notices patient picking at her ears. Patient denies pain and itchiness. Chloe has had approximately numerous episodes of otitis externa in the past several years. The infections typically affect the right ear and are typically manifested by ear drainage.  The last ear infection was 2 months ago.  Her nasal symptoms consist of clear rhinorrhea.      She was started on ofloxacin and Pred Forte drops on 4/26/2023 for right ear drainage. She went for hearing aid fitting on 4/28/2023 and audiologist noted drainage in her right ear.     Interval HPI 5/17/2023:  Follow up visit. She is here with her daughter today. States she is doing well. Denies ear pain and drainage. She recently had new hearing aids and is hearing better. No complaints today. Ear culture on 5/1/2023 was positive for aspergillus flavus. She has completed clotrimazole otic drops x 14 days.     Interval HPI 10/31/2023:  Follow up visit.  She had seen Michelle Leo, audiology, on 10/24/23 for hearing aid check and repair. Mrs. Leo noticed redness and irritation in her right ear after cerumen removal.  The patient is here to evaluate for possible ear infection. She has a hx of right perforated TM. Her son states that he has started ofloxacin and Pred Forte from previous ear infection and has started applying the drops in her right ear twice a day for 5 days now. Denies ear pain and drainage.     Interval HPI 3/21/2024:  Follow up visit. She is here today with her daughter and son-in-law. Per her daughter, she has been having drainage and flaky skin in the right ear. The patient's son has started on the ear drops a couple days ago. She denies ear pain. She has a history of hearing loss and is wearing bilateral hearing aids.      Interval HPI 3/26/2024:  Follow up visit. Ms. Corona is here today with her son. She has no complaints today. Denies pain and drainage. Ear culture was positive for candida parapsilosis and aspergillus flavus. She has been using the clotrimazole and Pred Forte otic drops.     Interval HPI 6/13/2024:  She is here today with her daughter for a routine cerumen removal. Denies ear pain and drainage. Her daughter states that the patient is having more trouble with hearing. She wears bilateral hearing aids.     Interval HPI 7/26/2024:  Ms. Corona is here today with her son. Her son reports that she has been having drainage in the right ear. Denies ear pain. She wears bilateral hearing aids.       Past Medical History:   Diagnosis Date    Anxiety     Black eye 5/6/2013    Dementia without behavioral disturbance 4/15/2021    Dyslipidemia     Essential hypertension 10/10/2022    Hyperthyroidism     IGT (impaired glucose tolerance)     Mitral valve prolapse     Nontoxic multinodular goiter 2/18/2013    Primary hypothyroidism 5/18/2015    Shingles     left side of chest and back    Thyroid nodule 2/18/2013    Urinary tract infection      Social History     Socioeconomic History    Marital status:    Occupational History    Occupation: Retired   Tobacco Use    Smoking status: Never    Smokeless tobacco: Never   Substance and Sexual Activity    Alcohol use: No     Alcohol/week: 0.0 standard drinks of alcohol    Drug use: No    Sexual activity: Never   Social History Narrative    Her  passed away in March 2014. She lives with her  and has sitters and a son who helps. She has four children two of whom are twins. Her son is running the family business. Her children are all educated. She worked doing  for the [employer]. She has an education degree and is a retired . She started a travel agency for 15 years. She is a non-smoker and denies alcohol or substance abuse.                       Social Determinants of Health     Financial Resource Strain: Low Risk  (3/24/2024)    Overall Financial Resource Strain (CARDIA)     Difficulty of Paying Living Expenses: Not hard at all   Food Insecurity: No Food Insecurity (3/24/2024)    Hunger Vital Sign     Worried About Running Out of Food in the Last Year: Never true     Ran Out of Food in the Last Year: Never true   Transportation Needs: No Transportation Needs (3/24/2024)    PRAPARE - Transportation     Lack of Transportation (Medical): No     Lack of Transportation (Non-Medical): No   Physical Activity: Inactive (3/24/2024)    Exercise Vital Sign     Days of Exercise per Week: 0 days     Minutes of Exercise per Session: 0 min   Stress: Patient Declined (3/24/2024)    [employer] - Occupational Stress Questionnaire     Feeling of Stress : Patient declined   Housing Stability: Low Risk  (3/24/2024)    Housing Stability Vital Sign     Unable to Pay for Housing in the Last Year: No     Number of Places Lived in the Last Year: 1     Unstable Housing in the Last Year: No     Past Surgical History:   Procedure Laterality Date    CATARACT EXTRACTION BILATERAL W/ ANTERIOR VITRECTOMY      DILATION AND CURETTAGE OF UTERUS      x2     Family History   Problem Relation Name Age of Onset    Thyroid disease Father      Thyroid disease Son      Thyroid disease Daughter      Diabetes Neg Hx      Breast cancer Neg Hx      Colon cancer Neg Hx      Ovarian cancer Neg Hx             Review of Systems  General: negative for chills, fever or weight loss  Psychological: negative for mood changes or depression  Ophthalmic: negative for blurry vision, photophobia or eye pain  ENT: see HPI  Respiratory: no cough, shortness of breath, or wheezing  Cardiovascular: no chest pain or dyspnea on exertion  Gastrointestinal: no abdominal pain, change in bowel habits, or black/ bloody stools  Musculoskeletal: negative for gait disturbance or muscular  weakness  Neurological: no syncope or seizures; no ataxia  Dermatological: negative for pruritis,  rash and jaundice  Hemotologic/Lymphatic: no new adenopathy, no easy bruising or bleeding      Physical Exam:    Vitals:    07/26/24 0923   BP: 110/69   Pulse: 76           Constitutional: Well appearing / communicating without difficutly.  NAD.  Eyes: EOM I Bilaterally  Head/Face: Normocephalic.  Negative paranasal sinus pressure/tenderness.  Salivary glands WNL.  House Brackmann I Bilaterally.    Right Ear: Auricle WNL. EAC with cerumen impaction and white spores noted. EAC within normal limits no drainage, edema, erythema, lesions or masses,TM perforation (25%).   Left Ear: Auricle WNL. EAC within normal limits no lesions or masses,TM w/o masses/lesions/perforations. TM mobility noted.  Nose: No gross nasal septal deviation. Inferior Turbinates 3+ bilaterally. No septal perforation. No masses/lesions. External nasal skin appears normal without masses/lesions.  Oral Cavity: Gingiva/lips within normal limits.  Dentition/gingiva healthy appearing. Mucus membranes moist. Floor of mouth soft, no masses palpated. Oral Tongue mobile. Hard Palate appears normal.    Oropharynx: Base of tongue appears normal. No masses/lesions noted. Tonsillar fossa/pharyngeal wall without lesions. Posterior oropharynx WNL.  Soft palate without masses. Midline uvula.   Neck/Lymphatic: No LAD I-VI bilaterally.  No thyromegaly.  No masses noted on exam.    Mirror laryngoscopy/nasopharyngoscopy: Active gag reflex.  Unable to perform.    Neuro/Psychiatric: AOx3.  Normal mood and affect.   Cardiovascular: Normal carotid pulses bilaterally, no increasing jugular venous distention noted at cervical region bilaterally.    Respiratory: Normal respiratory effort, no stridor, no retractions noted.    Ear Cerumen Removal    Date/Time: 7/26/2024 9:20 AM    Performed by: Martina Knight NP  Authorized by: Martina Knight NP    Consent Done?:  Yes  (Verbal)    Local anesthetic:  None  Location details:  Right ear  Procedure type comment:  Suction  Cerumen  Removal Results:  Cerumen completely removed  Patient tolerance:  Patient tolerated the procedure well with no immediate complications              Assessment:    ICD-10-CM ICD-9-CM    1. Otitis externa of right ear, unspecified chronicity, unspecified type  H60.91 380.10 Aerobic culture      2. Impacted cerumen of right ear  H61.21 380.4 Ear Cerumen Removal            The primary encounter diagnosis was Otitis externa of right ear, unspecified chronicity, unspecified type. A diagnosis of Impacted cerumen of right ear was also pertinent to this visit.      Plan:  Orders Placed This Encounter   Procedures    Ear Cerumen Removal    Aerobic culture     -Cerumen impaction:  Removed under microscopy today without difficulty.    -obtained right ear culture  -start on clotrimazole solution  -instructed patient to not wear hearing aid in the right ear for one week  -follow up PRN      Martina Knight NP        Answers submitted by the patient for this visit:  Review of Symptoms Questionnaire  (Submitted on 7/25/2024)  None of these: Yes  Ear infection(s)?: Yes  ear discharge: Yes  None of these : Yes  None of these: Yes  None of these : Yes  abdominal pain: Yes  None of these: Yes  back pain: Yes  None of these : Yes  None of these: Yes  None of these : Yes  None of these: Yes  None of these: Yes  None of these: Yes

## 2024-07-27 LAB — BACTERIA SPEC AEROBE CULT: NO GROWTH

## 2024-07-29 ENCOUNTER — PATIENT MESSAGE (OUTPATIENT)
Dept: OTOLARYNGOLOGY | Facility: CLINIC | Age: 89
End: 2024-07-29
Payer: MEDICARE

## 2024-07-29 NOTE — TELEPHONE ENCOUNTER
I spoke with the patient's son, Mr. Anderson, over the phone and reviewed ear culture results. Culture was positive for candida parapsilosis. Continue with current treatment plan with clotrimazole and Pred Forte. He verbally understood.

## 2024-08-05 ENCOUNTER — EXTERNAL HOME HEALTH (OUTPATIENT)
Dept: HOME HEALTH SERVICES | Facility: HOSPITAL | Age: 89
End: 2024-08-05
Payer: MEDICARE

## 2024-08-05 ENCOUNTER — PATIENT MESSAGE (OUTPATIENT)
Dept: AUDIOLOGY | Facility: CLINIC | Age: 89
End: 2024-08-05
Payer: MEDICARE

## 2024-08-17 PROBLEM — N18.32 STAGE 3B CHRONIC KIDNEY DISEASE: Status: ACTIVE | Noted: 2024-08-17

## 2024-08-17 PROBLEM — N18.32 ANEMIA IN STAGE 3B CHRONIC KIDNEY DISEASE: Status: ACTIVE | Noted: 2024-08-17

## 2024-08-17 PROBLEM — D50.9 IRON DEFICIENCY ANEMIA: Status: ACTIVE | Noted: 2024-08-17

## 2024-08-17 PROBLEM — Z87.448 H/O: HEMATURIA: Status: ACTIVE | Noted: 2024-08-17

## 2024-08-17 PROBLEM — E55.9 VITAMIN D DEFICIENCY: Status: ACTIVE | Noted: 2024-08-17

## 2024-08-17 PROBLEM — D63.1 ANEMIA IN STAGE 3B CHRONIC KIDNEY DISEASE: Status: ACTIVE | Noted: 2024-08-17

## 2024-08-23 ENCOUNTER — PATIENT MESSAGE (OUTPATIENT)
Dept: AUDIOLOGY | Facility: CLINIC | Age: 89
End: 2024-08-23
Payer: MEDICARE

## 2024-08-23 ENCOUNTER — DOCUMENTATION ONLY (OUTPATIENT)
Dept: AUDIOLOGY | Facility: CLINIC | Age: 89
End: 2024-08-23
Payer: MEDICARE

## 2024-08-23 NOTE — PROGRESS NOTES
Patient's hearing aid has returned from .   Reason for service: distorted  Action Taken: electronics were replaced and general cleaning was completed.  The cShell was cleaned and the  was replaced.     I will message her son via the portal to schedule a  fitting.

## 2024-09-18 ENCOUNTER — CLINICAL SUPPORT (OUTPATIENT)
Dept: AUDIOLOGY | Facility: CLINIC | Age: 89
End: 2024-09-18
Payer: MEDICARE

## 2024-09-18 DIAGNOSIS — H90.3 SENSORINEURAL HEARING LOSS, BILATERAL: Primary | ICD-10-CM

## 2024-09-18 PROCEDURE — 99499 UNLISTED E&M SERVICE: CPT | Mod: S$GLB,,, | Performed by: AUDIOLOGIST

## 2024-09-18 NOTE — PROGRESS NOTES
HEARING AID FOLLOW-UP    Chloe Foster was seen today for a hearing aid follow-up visit to  her repaired right hearing aid under warranty.    Reason for service: distorted  Action Taken: electronics were replaced and general cleaning was completed.  The cShell was cleaned and the  was replaced.     Mrs. Corona was accompanied by her daughter, Chloe. The right and left hearing aid were connected wirelessly to the software. The left hearing aid completed a firmware update successfully. The right aid returned from repair already updated.     Hearing aids were confirmed to be set to patient's liking. Patient was advised to return to clinic as needed for adjustments.

## 2024-09-21 ENCOUNTER — IMMUNIZATION (OUTPATIENT)
Dept: FAMILY MEDICINE | Facility: CLINIC | Age: 89
End: 2024-09-21
Payer: MEDICARE

## 2024-09-21 DIAGNOSIS — Z23 NEED FOR VACCINATION: Primary | ICD-10-CM

## 2024-09-21 PROCEDURE — G0008 ADMIN INFLUENZA VIRUS VAC: HCPCS | Mod: S$GLB,,, | Performed by: NURSE PRACTITIONER

## 2024-09-21 PROCEDURE — 90653 IIV ADJUVANT VACCINE IM: CPT | Mod: S$GLB,,, | Performed by: NURSE PRACTITIONER

## 2024-09-23 ENCOUNTER — PATIENT MESSAGE (OUTPATIENT)
Dept: OTOLARYNGOLOGY | Facility: CLINIC | Age: 89
End: 2024-09-23
Payer: MEDICARE

## 2024-09-24 RX ORDER — OFLOXACIN 3 MG/ML
SOLUTION AURICULAR (OTIC)
Qty: 5 ML | Refills: 0 | Status: SHIPPED | OUTPATIENT
Start: 2024-09-24

## 2024-09-25 ENCOUNTER — PATIENT MESSAGE (OUTPATIENT)
Dept: FAMILY MEDICINE | Facility: CLINIC | Age: 89
End: 2024-09-25
Payer: MEDICARE

## 2024-09-25 DIAGNOSIS — N18.31 STAGE 3A CHRONIC KIDNEY DISEASE: Primary | ICD-10-CM

## 2024-09-25 DIAGNOSIS — R79.9 ABNORMAL BLOOD CHEMISTRY: ICD-10-CM

## 2024-09-27 ENCOUNTER — OFFICE VISIT (OUTPATIENT)
Dept: FAMILY MEDICINE | Facility: CLINIC | Age: 89
End: 2024-09-27
Payer: MEDICARE

## 2024-09-27 VITALS
HEART RATE: 75 BPM | RESPIRATION RATE: 16 BRPM | WEIGHT: 160.69 LBS | DIASTOLIC BLOOD PRESSURE: 68 MMHG | OXYGEN SATURATION: 96 % | BODY MASS INDEX: 25.83 KG/M2 | SYSTOLIC BLOOD PRESSURE: 116 MMHG | HEIGHT: 66 IN | TEMPERATURE: 98 F

## 2024-09-27 DIAGNOSIS — M17.12 PRIMARY OSTEOARTHRITIS OF LEFT KNEE: ICD-10-CM

## 2024-09-27 DIAGNOSIS — E89.0 POSTABLATIVE HYPOTHYROIDISM: ICD-10-CM

## 2024-09-27 DIAGNOSIS — I70.0 AORTIC ATHEROSCLEROSIS: ICD-10-CM

## 2024-09-27 DIAGNOSIS — N18.31 STAGE 3A CHRONIC KIDNEY DISEASE: ICD-10-CM

## 2024-09-27 DIAGNOSIS — I10 ESSENTIAL HYPERTENSION: ICD-10-CM

## 2024-09-27 DIAGNOSIS — Z29.11 NEED FOR RSV IMMUNIZATION: ICD-10-CM

## 2024-09-27 DIAGNOSIS — F02.B0 MODERATE LATE ONSET ALZHEIMER'S DEMENTIA WITHOUT BEHAVIORAL DISTURBANCE, PSYCHOTIC DISTURBANCE, MOOD DISTURBANCE, OR ANXIETY: ICD-10-CM

## 2024-09-27 DIAGNOSIS — G30.1 MODERATE LATE ONSET ALZHEIMER'S DEMENTIA WITHOUT BEHAVIORAL DISTURBANCE, PSYCHOTIC DISTURBANCE, MOOD DISTURBANCE, OR ANXIETY: ICD-10-CM

## 2024-09-27 PROCEDURE — 99999 PR PBB SHADOW E&M-EST. PATIENT-LVL IV: CPT | Mod: PBBFAC,,, | Performed by: STUDENT IN AN ORGANIZED HEALTH CARE EDUCATION/TRAINING PROGRAM

## 2024-09-27 NOTE — PROGRESS NOTES
Subjective:       Patient ID: Chloe Foster is a 89 y.o. female.    Chief Complaint: Follow-up    HPI    89 yo pleasant Female with dementia (mildly dependent on family member(son) on ADLs, sHTN, HLD, hypothyroidism , stage 3a CKD and MVP for f/u on chonic conditions. She was accompanied in office by her son today, endorses no new complaints except progressively worsening memory. Her appetite is great and moves her bowel daily.She described her mood to be great as well today.     Review of Systems - Negative except per HPI    Past Medical History:   Diagnosis Date    Anemia in stage 3b chronic kidney disease 8/17/2024    Anxiety     Black eye 05/06/2013    Dementia     Dementia without behavioral disturbance 04/15/2021    Dyslipidemia     Essential hypertension 10/10/2022    Hyperthyroidism     Hypothyroidism, unspecified     IGT (impaired glucose tolerance)     Mitral valve prolapse     Nontoxic multinodular goiter 02/18/2013    Primary hypothyroidism 05/18/2015    Shingles     left side of chest and back    Thyroid nodule 02/18/2013    Urinary tract infection        Past Surgical History:   Procedure Laterality Date    CATARACT EXTRACTION BILATERAL W/ ANTERIOR VITRECTOMY      DILATION AND CURETTAGE OF UTERUS      x2       Family History   Problem Relation Name Age of Onset    Thyroid disease Father      Anemia Sister      Anemia Brother      Anemia Maternal Aunt      Thyroid disease Daughter      Thyroid disease Son      Diabetes Neg Hx      Breast cancer Neg Hx      Colon cancer Neg Hx      Ovarian cancer Neg Hx         Social History     Socioeconomic History    Marital status:    Occupational History    Occupation: Retired   Tobacco Use    Smoking status: Never    Smokeless tobacco: Never   Substance and Sexual Activity    Alcohol use: No     Alcohol/week: 0.0 standard drinks of alcohol    Drug use: Never    Sexual activity: Never   Social History Narrative    Her  passed away in March 2014.  "She lives with her  and has sitters and a son who helps. She has four children two of whom are twins. Her son is running the family business. Her children are all educated. She worked doing  for the State [employer]. She has an education degree and is a retired . She started a travel agency for 15 years. She is a non-smoker and denies alcohol or substance abuse.                      Social Determinants of Health     Financial Resource Strain: Low Risk  (3/24/2024)    Overall Financial Resource Strain (CARDIA)     Difficulty of Paying Living Expenses: Not hard at all   Food Insecurity: No Food Insecurity (3/24/2024)    Hunger Vital Sign     Worried About Running Out of Food in the Last Year: Never true     Ran Out of Food in the Last Year: Never true   Transportation Needs: No Transportation Needs (3/24/2024)    PRAPARE - Transportation     Lack of Transportation (Medical): No     Lack of Transportation (Non-Medical): No   Physical Activity: Inactive (3/24/2024)    Exercise Vital Sign     Days of Exercise per Week: 0 days     Minutes of Exercise per Session: 0 min   Stress: Patient Declined (3/24/2024)    [employer] [place] [employer] - Occupational Stress Questionnaire     Feeling of Stress : Patient declined   Housing Stability: Low Risk  (3/24/2024)    Housing Stability Vital Sign     Unable to Pay for Housing in the Last Year: No     Number of Places Lived in the Last Year: 1     Unstable Housing in the Last Year: No         Objective:     Vitals:    09/27/24 1541   BP: 116/68   BP Location: Left arm   Patient Position: Sitting   BP Method: Large (Manual)   Pulse: 75   Resp: 16   Temp: 98.1 °F (36.7 °C)   TempSrc: Temporal   SpO2: 96%   Weight: 72.9 kg (160 lb 11.5 oz)   Height: 5' 6" (1.676 m)          Physical Exam  Vitals reviewed.   Constitutional:       Appearance: Normal appearance. She is normal weight.   HENT:      Mouth/Throat:      Mouth: Mucous membranes are " moist.      Pharynx: Oropharynx is clear.   Eyes:      Extraocular Movements: Extraocular movements intact.      Conjunctiva/sclera: Conjunctivae normal.      Pupils: Pupils are equal, round, and reactive to light.   Cardiovascular:      Rate and Rhythm: Normal rate and regular rhythm.      Heart sounds: Normal heart sounds. Murmur: opening snap.   Pulmonary:      Effort: Pulmonary effort is normal.      Breath sounds: Normal breath sounds. No stridor. No wheezing or rales.   Abdominal:      General: Abdomen is flat. Bowel sounds are normal.      Palpations: Abdomen is soft.   Musculoskeletal:      Right lower leg: No edema.      Left lower leg: No edema.   Skin:     General: Skin is warm.      Coloration: Skin is not jaundiced.      Findings: No bruising.   Neurological:      General: No focal deficit present.      Mental Status: She is alert.      Gait: Gait normal.           Laboratory:  CBC:  Recent Labs   Lab Result Units 07/01/24  1053 08/07/24  1703   WBC K/uL 10.02 11.29   RBC M/uL 3.80* 4.03   Hemoglobin g/dL 11.7* 12.4   Hematocrit % 35.8* 37.1   Platelets K/uL 275 246   MCV fL 94 92   MCH pg 30.8 30.8   MCHC g/dL 32.7 33.4       CMP:  Recent Labs   Lab Result Units 07/01/24  1053 08/07/24  1703   Glucose mg/dL 101 96   Calcium mg/dL 9.7 9.4   Albumin g/dL 3.3* 3.7   Total Protein g/dL 6.7 6.8   Sodium mmol/L 139 136   Potassium mmol/L 4.2 4.5   CO2 mmol/L 23 24   Chloride mmol/L 104 104   BUN mg/dL 23 27   Alkaline Phosphatase U/L 80 79   ALT U/L 11 13   AST U/L 20 19   Total Bilirubin mg/dL 0.3 0.3       URINALYSIS:  Recent Labs   Lab Result Units 07/08/24  1401 08/07/24  1654 08/13/24  1452   Color, UA   --  Yellow Yellow   Specific Gravity, UA   --  1.025 1.015   pH, UA   --  6.0 7.0   Protein, UA   --  Negative Negative   Bacteria /hpf Few* Few*  --    Nitrite, UA   --  Negative Negative   Leukocytes, UA   --  Trace* Negative   Urobilinogen, UA EU/dL  --  Negative Negative   Hyaline Casts, UA /lpf 0  2*  --       LIPIDS:  Recent Labs   Lab Result Units 07/01/24  1053 09/26/24  1012   TSH uIU/mL 1.974  --    HDL mg/dL  --  42   Cholesterol mg/dL  --  168   Triglycerides mg/dL  --  104   LDL Cholesterol mg/dL  --  105.2   HDL/Cholesterol Ratio %  --  25.0   Non-HDL Cholesterol mg/dL  --  126   Total Cholesterol/HDL Ratio   --  4.0       TSH:  Recent Labs   Lab Result Units 07/01/24  1053   TSH uIU/mL 1.974       A1C:  Recent Labs   Lab Result Units 09/26/24  1012   Hemoglobin A1C % 5.2       Assessment:         ICD-10-CM ICD-9-CM   1. Moderate late onset Alzheimer's dementia without behavioral disturbance, psychotic disturbance, mood disturbance, or anxiety  G30.1 331.0    F02.B0 294.10   2. Postablative hypothyroidism  E89.0 244.1   3. Stage 3a chronic kidney disease  N18.31 585.3   4. Primary osteoarthritis of left knee  M17.12 715.16   5. Aortic atherosclerosis  I70.0 440.0   6. Need for RSV immunization  Z29.11 V04.82   7. Essential hypertension  I10 401.9       Plan:       Late onset Alzheimer's dementia without behavioral disturbance  -except intermittent bouts of anxiety which is easily corrected with redirection  -watchful waiting for now  -c/w zoloft/aricept    Hypothyroidism (acquired)  - recent TSH WNL  -c/w 75mcg for now    CKD stage 3a  -stable renal function  -avoid nephrotoxins    sHTN  -with intermittent hypotension alternating hypertension(labile)  -likely autonomic dysfunction  -currently off all antihypertensives  -BP well controlled in office today  -watchful waiting for now    OA knee  -stable for now    Aortic atherosclerosis  -stable for age  -monitor for now      Patient's Medications   New Prescriptions    RSV, PREF A AND PREF B,PF, (ABRYSVO) 120 MCG/0.5 ML SOLR VACCINE    Inject 0.5 mLs (120 mcg total) into the muscle once. for 1 dose   Previous Medications    ANTIOX#10/OM3/DHA/EPA/LUT/ZEAX (I-CAPS ORAL)    Take 2 capsules by mouth once daily.    CALCIUM-VITAMIN D (OSCAL) 250 (625)-125  MG-UNIT PER TABLET    Take 1 tablet by mouth 2 (two) times daily.    DONEPEZIL (ARICEPT) 5 MG TABLET    Take 1 tablet (5 mg total) by mouth once daily.    LEVOTHYROXINE (SYNTHROID) 75 MCG TABLET    TAKE 1 TABLET (75 MCG TOTAL) BY MOUTH BEFORE BREAKFAST.    MULTIVIT-IRON-FA-CALCIUM-MINS 18 MG IRON-400 MCG-500 MG CA TAB    Take 1 tablet by mouth once daily.    OFLOXACIN (FLOXIN) 0.3 % OTIC SOLUTION    Place 3 drops in affected ear bid x 10 days.    PREDNISOLONE ACETATE (PRED FORTE) 1 % DRPS    Apply 2 drops  to affected ear BID x 10 days    SERTRALINE (ZOLOFT) 100 MG TABLET    TAKE 1 TABLET (100 MG TOTAL) BY MOUTH ONCE DAILY.   Modified Medications    No medications on file   Discontinued Medications    No medications on file       Patient and son were given opportunity to express concerns, ask questions and verbalizes understanding of current management plan.    43 minutes of total time spent on the encounter, which includes face to face time and non-face to face time preparing to see the patient (eg, review of tests), Obtaining and/or reviewing separately obtained history, Documenting clinical information in the electronic or other health record, Independently interpreting results (not separately reported) and communicating results to the patient/family/caregiver, or Care coordination (not separately reported).      Dr Denise Naqvi MD  Internal Medicine, [facility]

## 2024-10-02 ENCOUNTER — OFFICE VISIT (OUTPATIENT)
Dept: OTOLARYNGOLOGY | Facility: CLINIC | Age: 89
End: 2024-10-02
Payer: MEDICARE

## 2024-10-02 VITALS — HEART RATE: 76 BPM | DIASTOLIC BLOOD PRESSURE: 68 MMHG | SYSTOLIC BLOOD PRESSURE: 127 MMHG

## 2024-10-02 DIAGNOSIS — H60.91 OTITIS EXTERNA OF RIGHT EAR, UNSPECIFIED CHRONICITY, UNSPECIFIED TYPE: Primary | ICD-10-CM

## 2024-10-02 PROCEDURE — 87070 CULTURE OTHR SPECIMN AEROBIC: CPT | Performed by: NURSE PRACTITIONER

## 2024-10-02 PROCEDURE — 99999 PR PBB SHADOW E&M-EST. PATIENT-LVL III: CPT | Mod: PBBFAC,,, | Performed by: NURSE PRACTITIONER

## 2024-10-02 NOTE — PROGRESS NOTES
Chief Complaint   Patient presents with    Cerumen Impaction     Ear cleaning   .     HPI 5/1/2023: Chloe Foster is a 89 y.o. female who is referred to me by Dr. Denise Guzman* in consultation for possible ear infection. Son notices patient picking at her ears. Patient denies pain and itchiness. Chloe has had approximately numerous episodes of otitis externa in the past several years. The infections typically affect the right ear and are typically manifested by ear drainage.  The last ear infection was 2 months ago.  Her nasal symptoms consist of clear rhinorrhea.      She was started on ofloxacin and Pred Forte drops on 4/26/2023 for right ear drainage. She went for hearing aid fitting on 4/28/2023 and audiologist noted drainage in her right ear.     Interval HPI 5/17/2023:  Follow up visit. She is here with her daughter today. States she is doing well. Denies ear pain and drainage. She recently had new hearing aids and is hearing better. No complaints today. Ear culture on 5/1/2023 was positive for aspergillus flavus. She has completed clotrimazole otic drops x 14 days.     Interval HPI 10/31/2023:  Follow up visit.  She had seen Michelle Leo, audiology, on 10/24/23 for hearing aid check and repair. Mrs. Leo noticed redness and irritation in her right ear after cerumen removal.  The patient is here to evaluate for possible ear infection. She has a hx of right perforated TM. Her son states that he has started ofloxacin and Pred Forte from previous ear infection and has started applying the drops in her right ear twice a day for 5 days now. Denies ear pain and drainage.     Interval HPI 3/21/2024:  Follow up visit. She is here today with her daughter and son-in-law. Per her daughter, she has been having drainage and flaky skin in the right ear. The patient's son has started on the ear drops a couple days ago. She denies ear pain. She has a history of hearing loss and is wearing bilateral hearing  aids.     Interval HPI 3/26/2024:  Follow up visit. Ms. Corona is here today with her son. She has no complaints today. Denies pain and drainage. Ear culture was positive for candida parapsilosis and aspergillus flavus. She has been using the clotrimazole and Pred Forte otic drops.     Interval HPI 6/13/2024:  She is here today with her daughter for a routine cerumen removal. Denies ear pain and drainage. Her daughter states that the patient is having more trouble with hearing. She wears bilateral hearing aids.     Interval HPI 7/26/2024:  Ms. Corona is here today with her son. Her son reports that she has been having drainage in the right ear. Denies ear pain. She wears bilateral hearing aids.     Interval HPI 10/2/2024:  She is here today with her son, Mr. Anderson. Mr. Anderson reports of right ear drainage that started last week. He started using ofloxacin with some improvement. Her son reports that she picks at her right ear constantly. She wears bilateral hearing aids.       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 She lives  with her  and has sitters and a son who helps. She has four children two of whom are twins. Her son is running the family business. Her children are all educated. She worked doing  for the [employer]. She has an education degree and is a retired . She started a travel agency for 15 years. She is a non-smoker and denies alcohol or substance abuse.                      Social Drivers of Health     Financial Resource Strain: Low Risk  (3/24/2024)    Overall Financial Resource Strain (CARDIA)     Difficulty of Paying Living Expenses: Not hard at all   Food Insecurity: No Food Insecurity (3/24/2024)    Hunger Vital Sign     Worried About Running Out of Food in the Last Year: Never true     Ran Out of Food in the Last Year: Never true   Transportation Needs: No Transportation Needs (3/24/2024)    PRAPARE - Transportation     Lack of Transportation (Medical): No     Lack of Transportation (Non-Medical): No   Physical Activity: Inactive (3/24/2024)    Exercise Vital Sign     Days of Exercise per Week: 0 days     Minutes of Exercise per Session: 0 min   Stress: Patient Declined (3/24/2024)    [employer] - Occupational Stress Questionnaire     Feeling of Stress : Patient declined   Housing Stability: Low Risk  (3/24/2024)    Housing Stability Vital Sign     Unable to Pay for Housing in the Last Year: No     Number of Places Lived in the Last Year: 1     Unstable Housing in the Last Year: No     Past Surgical History:   Procedure Laterality Date    CATARACT EXTRACTION BILATERAL W/ ANTERIOR VITRECTOMY      DILATION AND CURETTAGE OF UTERUS      x2     Family History   Problem Relation Name Age of Onset    Thyroid disease Father      Anemia Sister      Anemia Brother      Anemia Maternal Aunt      Thyroid disease Daughter      Thyroid disease Son      Diabetes Neg Hx      Breast cancer Neg Hx      Colon cancer Neg Hx      Ovarian cancer Neg Hx             Review  of Systems  General: negative for chills, fever or weight loss  Psychological: negative for mood changes or depression  Ophthalmic: negative for blurry vision, photophobia or eye pain  ENT: see HPI  Respiratory: no cough, shortness of breath, or wheezing  Cardiovascular: no chest pain or dyspnea on exertion  Gastrointestinal: no abdominal pain, change in bowel habits, or black/ bloody stools  Musculoskeletal: negative for gait disturbance or muscular weakness  Neurological: no syncope or seizures; no ataxia  Dermatological: negative for pruritis,  rash and jaundice  Hemotologic/Lymphatic: no new adenopathy, no easy bruising or bleeding      Physical Exam:    Vitals:    10/02/24 1439   BP: 127/68   Pulse: 76         Constitutional: Well appearing / communicating without difficutly.  NAD.  Eyes: EOM I Bilaterally  Head/Face: Normocephalic.  Negative paranasal sinus pressure/tenderness.  Salivary glands WNL.  House Brackmann I Bilaterally.    Right Ear: Auricle WNL. EAC with thick purulent drainage. EAC within normal limits no drainage, edema, erythema, lesions or masses,TM perforation   Left Ear: Auricle WNL. EAC within normal limits no lesions or masses,TM w/o masses/lesions/perforations. TM mobility noted.  Nose: No gross nasal septal deviation. Inferior Turbinates 3+ bilaterally. No septal perforation. No masses/lesions. External nasal skin appears normal without masses/lesions.  Oral Cavity: Gingiva/lips within normal limits.  Dentition/gingiva healthy appearing. Mucus membranes moist. Floor of mouth soft, no masses palpated. Oral Tongue mobile. Hard Palate appears normal.    Oropharynx: Base of tongue appears normal. No masses/lesions noted. Tonsillar fossa/pharyngeal wall without lesions. Posterior oropharynx WNL.  Soft palate without masses. Midline uvula.   Neck/Lymphatic: No LAD I-VI bilaterally.  No thyromegaly.  No masses noted on exam.    Mirror laryngoscopy/nasopharyngoscopy: Active gag reflex.  Unable to  perform.    Neuro/Psychiatric: AOx3.  Normal mood and affect.   Cardiovascular: Normal carotid pulses bilaterally, no increasing jugular venous distention noted at cervical region bilaterally.    Respiratory: Normal respiratory effort, no stridor, no retractions noted.    Debridement    Date/Time: 10/2/2024 3:40 PM    Performed by: Martina Knight NP  Authorized by: Martina Knight NP    Consent Done?:  Yes (Verbal)  Local anesthesia used?: No      Debridement - 1st Wound - General Location: righ ear canal.       Length (cm):  1       Area (sq cm):  1       Width (cm):  1       Percent Debrided (%):  100       Depth (cm):  1       Total Area Debrided (sq cm):  1    Depth of debridement:  Epidermis/Dermis    Tissue debrided:  Epidermis    Devitalized tissue debrided:  Other (purulent drainage)    Instruments:  Other (suction)  Bleeding:  None  Patient tolerance:  Patient tolerated the procedure well with no immediate complications  Specimen Collected: Specimen sent to microbiology                Assessment:    ICD-10-CM ICD-9-CM    1. Otitis externa of right ear, unspecified chronicity, unspecified type  H60.91 380.10 Aerobic culture      Debridement            The encounter diagnosis was Otitis externa of right ear, unspecified chronicity, unspecified type.      Plan:  Orders Placed This Encounter   Procedures    Debridement    Aerobic culture     -obtained right ear culture. Will call with results  -continue with ofloxacin 3-4 drops in the right ear bid x 10 days  -follow up if there's no improvement.       Martina Knight NP        Answers submitted by the patient for this visit:  Review of Symptoms Questionnaire  (Submitted on 9/25/2024)  None of these: Yes  hearing loss: Yes  Ear infection(s)?: Yes  ear pain: Yes  None of these : Yes  None of these: Yes  None of these : Yes  None of these: Yes  None of these: Yes  None of these: Yes  None of these : Yes  None of these: Yes  None of these : Yes  dizziness:  Yes  None of these: Yes  None of these: Yes

## 2024-10-02 NOTE — PROCEDURES
Debridement    Date/Time: 10/2/2024 3:40 PM    Performed by: Martina Knight NP  Authorized by: Martina Knight NP    Consent Done?:  Yes (Verbal)  Local anesthesia used?: No      Debridement - 1st Wound - General Location: righ ear canal.       Length (cm):  1       Area (sq cm):  1       Width (cm):  1       Percent Debrided (%):  100       Depth (cm):  1       Total Area Debrided (sq cm):  1    Depth of debridement:  Epidermis/Dermis    Tissue debrided:  Epidermis    Devitalized tissue debrided:  Other (purulent drainage)    Instruments:  Other (suction)  Bleeding:  None  Patient tolerance:  Patient tolerated the procedure well with no immediate complications  Specimen Collected: Specimen sent to microbiology

## 2024-10-05 LAB — BACTERIA SPEC AEROBE CULT: ABNORMAL

## 2024-10-07 RX ORDER — CLOTRIMAZOLE 1 G/ML
SOLUTION TOPICAL
Qty: 10 ML | Refills: 0 | Status: SHIPPED | OUTPATIENT
Start: 2024-10-07

## 2024-10-10 DIAGNOSIS — G30.1 MILD LATE ONSET ALZHEIMER'S DEMENTIA WITHOUT BEHAVIORAL DISTURBANCE, PSYCHOTIC DISTURBANCE, MOOD DISTURBANCE, OR ANXIETY: ICD-10-CM

## 2024-10-10 DIAGNOSIS — R53.83 FATIGUE, UNSPECIFIED TYPE: ICD-10-CM

## 2024-10-10 DIAGNOSIS — F02.A0 MILD LATE ONSET ALZHEIMER'S DEMENTIA WITHOUT BEHAVIORAL DISTURBANCE, PSYCHOTIC DISTURBANCE, MOOD DISTURBANCE, OR ANXIETY: ICD-10-CM

## 2024-10-10 DIAGNOSIS — E89.0 POSTABLATIVE HYPOTHYROIDISM: ICD-10-CM

## 2024-10-10 RX ORDER — DONEPEZIL HYDROCHLORIDE 5 MG/1
5 TABLET, FILM COATED ORAL
Qty: 90 TABLET | Refills: 3 | Status: SHIPPED | OUTPATIENT
Start: 2024-10-10

## 2024-10-10 RX ORDER — SERTRALINE HYDROCHLORIDE 100 MG/1
100 TABLET, FILM COATED ORAL
Qty: 90 TABLET | Refills: 3 | Status: SHIPPED | OUTPATIENT
Start: 2024-10-10

## 2024-10-10 RX ORDER — LEVOTHYROXINE SODIUM 75 UG/1
75 TABLET ORAL
Qty: 90 TABLET | Refills: 2 | Status: SHIPPED | OUTPATIENT
Start: 2024-10-10

## 2024-10-10 NOTE — TELEPHONE ENCOUNTER
Refill Routing Note   Medication(s) are not appropriate for processing by [facility] for the following reason(s):        Outside of protocol    [facility] action(s):  Route  Approve               Appointments  past 12m or future 3m with PCP    Date Provider   Last Visit   9/27/2024 Denise Naqvi MD   Next Visit   3/28/2025 Denise Naqvi MD   ED visits in past 90 days: 1        Note composed:12:36 PM 10/10/2024

## 2024-10-10 NOTE — TELEPHONE ENCOUNTER
No care due was identified.  [facility] Embedded Care Due Messages. Reference number: [id number].   10/10/2024 1:54:09 AM CDT

## 2025-03-03 DIAGNOSIS — F02.A0 MILD LATE ONSET ALZHEIMER'S DEMENTIA WITHOUT BEHAVIORAL DISTURBANCE, PSYCHOTIC DISTURBANCE, MOOD DISTURBANCE, OR ANXIETY: ICD-10-CM

## 2025-03-03 DIAGNOSIS — E89.0 POSTABLATIVE HYPOTHYROIDISM: ICD-10-CM

## 2025-03-03 DIAGNOSIS — R53.83 FATIGUE, UNSPECIFIED TYPE: ICD-10-CM

## 2025-03-03 DIAGNOSIS — G30.1 MILD LATE ONSET ALZHEIMER'S DEMENTIA WITHOUT BEHAVIORAL DISTURBANCE, PSYCHOTIC DISTURBANCE, MOOD DISTURBANCE, OR ANXIETY: ICD-10-CM

## 2025-03-03 RX ORDER — DONEPEZIL HYDROCHLORIDE 5 MG/1
5 TABLET, FILM COATED ORAL DAILY
Qty: 90 TABLET | Refills: 3 | Status: SHIPPED | OUTPATIENT
Start: 2025-03-03

## 2025-03-03 RX ORDER — LEVOTHYROXINE SODIUM 75 UG/1
75 TABLET ORAL
Qty: 90 TABLET | Refills: 2 | Status: SHIPPED | OUTPATIENT
Start: 2025-03-03

## 2025-03-03 RX ORDER — SERTRALINE HYDROCHLORIDE 100 MG/1
100 TABLET, FILM COATED ORAL DAILY
Qty: 90 TABLET | Refills: 3 | Status: SHIPPED | OUTPATIENT
Start: 2025-03-03

## 2025-03-03 NOTE — TELEPHONE ENCOUNTER
----- Message from Griselda sent at 3/3/2025  4:00 PM CST -----  Type:  Pharmacy Calling to Clarify an RXName of Caller:[facility] [[id number]][facility] Prescription Name:levothyroxine (SYNTHROID) 75 MCG tablet donepeziL (ARICEPT) 5 MG tabletsertraline (ZOLOFT) 100 MG tabletWhat do they need to clarify?:[facility] sent out a medication request for medication list and have not received response Best Call Back Number:[phone] Information:

## 2025-03-03 NOTE — TELEPHONE ENCOUNTER
No care due was identified.  [facility] Embedded Care Due Messages. Reference number: [id number].   3/03/2025 4:15:41 PM CST

## 2025-03-28 ENCOUNTER — PATIENT MESSAGE (OUTPATIENT)
Dept: FAMILY MEDICINE | Facility: CLINIC | Age: OVER 89
End: 2025-03-28
Payer: MEDICARE

## 2025-04-01 ENCOUNTER — OFFICE VISIT (OUTPATIENT)
Dept: FAMILY MEDICINE | Facility: CLINIC | Age: OVER 89
End: 2025-04-01
Payer: MEDICARE

## 2025-04-01 VITALS
OXYGEN SATURATION: 96 % | DIASTOLIC BLOOD PRESSURE: 52 MMHG | TEMPERATURE: 98 F | SYSTOLIC BLOOD PRESSURE: 96 MMHG | HEART RATE: 76 BPM | BODY MASS INDEX: 28.36 KG/M2 | WEIGHT: 175.69 LBS

## 2025-04-01 DIAGNOSIS — M25.562 CHRONIC PAIN OF LEFT KNEE: ICD-10-CM

## 2025-04-01 DIAGNOSIS — N18.32 ANEMIA IN STAGE 3B CHRONIC KIDNEY DISEASE: ICD-10-CM

## 2025-04-01 DIAGNOSIS — H90.3 SENSORINEURAL HEARING LOSS (SNHL) OF BOTH EARS: ICD-10-CM

## 2025-04-01 DIAGNOSIS — N18.32 STAGE 3B CHRONIC KIDNEY DISEASE: ICD-10-CM

## 2025-04-01 DIAGNOSIS — F02.B0 MODERATE LATE ONSET ALZHEIMER'S DEMENTIA WITHOUT BEHAVIORAL DISTURBANCE, PSYCHOTIC DISTURBANCE, MOOD DISTURBANCE, OR ANXIETY: Primary | ICD-10-CM

## 2025-04-01 DIAGNOSIS — R53.81 PHYSICAL DECONDITIONING: ICD-10-CM

## 2025-04-01 DIAGNOSIS — G30.1 MODERATE LATE ONSET ALZHEIMER'S DEMENTIA WITHOUT BEHAVIORAL DISTURBANCE, PSYCHOTIC DISTURBANCE, MOOD DISTURBANCE, OR ANXIETY: Primary | ICD-10-CM

## 2025-04-01 DIAGNOSIS — E03.9 ACQUIRED HYPOTHYROIDISM: ICD-10-CM

## 2025-04-01 DIAGNOSIS — Z91.81 PERSONAL HISTORY OF FALL: ICD-10-CM

## 2025-04-01 DIAGNOSIS — G89.29 CHRONIC PAIN OF LEFT KNEE: ICD-10-CM

## 2025-04-01 DIAGNOSIS — D63.1 ANEMIA IN STAGE 3B CHRONIC KIDNEY DISEASE: ICD-10-CM

## 2025-04-01 PROBLEM — F03.90 DEMENTIA WITHOUT BEHAVIORAL DISTURBANCE: Status: RESOLVED | Noted: 2021-04-15 | Resolved: 2025-04-01

## 2025-04-01 PROBLEM — R06.09 EXERTIONAL DYSPNEA: Status: RESOLVED | Noted: 2022-09-12 | Resolved: 2025-04-01

## 2025-04-01 PROBLEM — Z23 FLU VACCINE NEED: Status: RESOLVED | Noted: 2022-10-10 | Resolved: 2025-04-01

## 2025-04-01 PROBLEM — R21 RASH: Status: RESOLVED | Noted: 2020-10-15 | Resolved: 2025-04-01

## 2025-04-01 PROBLEM — R73.09 ABNORMAL GLUCOSE: Status: RESOLVED | Noted: 2020-03-06 | Resolved: 2025-04-01

## 2025-04-01 PROBLEM — I10 ESSENTIAL HYPERTENSION: Status: RESOLVED | Noted: 2022-10-10 | Resolved: 2025-04-01

## 2025-04-01 PROCEDURE — 99999 PR PBB SHADOW E&M-EST. PATIENT-LVL III: CPT | Mod: PBBFAC,,, | Performed by: NURSE PRACTITIONER

## 2025-04-01 PROCEDURE — 1126F AMNT PAIN NOTED NONE PRSNT: CPT | Mod: CPTII,S$GLB,, | Performed by: NURSE PRACTITIONER

## 2025-04-01 PROCEDURE — 99215 OFFICE O/P EST HI 40 MIN: CPT | Mod: S$GLB,,, | Performed by: NURSE PRACTITIONER

## 2025-04-01 PROCEDURE — 1159F MED LIST DOCD IN RCRD: CPT | Mod: CPTII,S$GLB,, | Performed by: NURSE PRACTITIONER

## 2025-04-01 PROCEDURE — 1160F RVW MEDS BY RX/DR IN RCRD: CPT | Mod: CPTII,S$GLB,, | Performed by: NURSE PRACTITIONER

## 2025-04-01 PROCEDURE — 3288F FALL RISK ASSESSMENT DOCD: CPT | Mod: CPTII,S$GLB,, | Performed by: NURSE PRACTITIONER

## 2025-04-01 PROCEDURE — 1101F PT FALLS ASSESS-DOCD LE1/YR: CPT | Mod: CPTII,S$GLB,, | Performed by: NURSE PRACTITIONER

## 2025-04-01 NOTE — PROGRESS NOTES
Patient ID: Chloe Foster is a 89 y.o. female.     Chief Complaint: Follow-up (6 month check up) and Knee Pain (Right )      HPI:  History of Present Illness    CHIEF COMPLAINT:  Patient presents today for follow-up    MUSCULOSKELETAL AND MOBILITY:  She experiences left knee pain due to arthritis, particularly when transitioning from sitting to standing position. She wears a brace when walking and uses topical treatment as needed. She uses a walker for ambulation, including long distances and throughout the house. She performs a daily routine of six laps around her large house. She has experienced recent falls but is able to get up independently after each incident. One fall occurred while reaching for paper from a chair in her bedroom.    ACTIVITIES OF DAILY LIVING:  She requires assistance with bathing but maintains ability to get in and out of walk-in shower independently using a shower bench. She participates in household tasks including vegetable chopping and clothes folding.    ENT:  She has a ruptured eardrum that occasionally becomes infected, requiring treatment. She wears well-functioning hearing aids and follows up with an audiologist and Dr. Fifi Knight for management.    GI:  She has history of gallstones with symptoms triggered by rich, creamy, or fatty foods.    SLEEP AND BEHAVIOR:  She experiences daytime somnolence. In the evening, around 5-6 PM, she becomes agitated and anxious, exhibiting behaviors such as compulsively checking door locks.      ROS:  General: -fever, -chills, -fatigue, -weight gain, -weight loss  Eyes: -vision changes, -redness, -discharge  ENT: -ear pain, -nasal congestion, -sore throat  Cardiovascular: -chest pain, -palpitations, -lower extremity edema  Respiratory: -cough, -shortness of breath  Gastrointestinal: -abdominal pain, -nausea, -vomiting, -diarrhea, -constipation, -blood in stool, +indigestion  Genitourinary: -dysuria, -hematuria, -frequency,  +urgency  Musculoskeletal: +joint pain, -muscle pain  Skin: -rash, -lesion  Neurological: -headache, -dizziness, -numbness, -tingling, +falling  Psychiatric: +anxiety, -depression, -sleep difficulty, +obsessive behavior            Active Problem List with Overview Notes    Diagnosis Date Noted    Personal history of fall 04/01/2025    Aortic atherosclerosis 09/27/2024    Stage 3b chronic kidney disease 08/17/2024    Anemia in stage 3b chronic kidney disease 08/17/2024    Iron deficiency anemia 08/17/2024    H/O: hematuria 08/17/2024    Vitamin D deficiency 08/17/2024    Seasonal allergic rhinitis 04/10/2023    Fatigue 10/10/2022    Physical deconditioning 09/12/2022    Moderate late onset Alzheimer's dementia without behavioral disturbance, psychotic disturbance, mood disturbance, or anxiety 09/12/2022    Paresthesia of both hands 10/17/2021    Dysthymia 04/15/2021    Chronic pain of left knee 10/15/2020    Acquired hypothyroidism 05/18/2015    Recurrent UTI 05/05/2015    Bladder cystocele 05/05/2015    Hearing loss, sensorineural 02/24/2015    Nontoxic multinodular goiter 02/18/2013    Postablative hypothyroidism 01/16/2013    Hyperlipidemia 01/16/2013    IGT (impaired glucose tolerance)            Currently Medications  Medications Ordered Prior to Encounter[1]    Allergies  Review of patient's allergies indicates:   Allergen Reactions    Bactrim [sulfamethoxazole-trimethoprim] Other (See Comments)     She just felt bad    Sulfa (sulfonamide antibiotics) Other (See Comments)     Unsure of reaction but felt fatigued on sulfa drug        Health Maintenance  You are up to date for your primary preventive health care, and there are no reminders at this time.     PMH:  Past Medical History:   Diagnosis Date    Anemia in stage 3b chronic kidney disease 8/17/2024    Anxiety     Black eye 05/06/2013    Dementia     Dementia without behavioral disturbance 04/15/2021    Dyslipidemia     Essential hypertension 10/10/2022     Hyperthyroidism     Hypothyroidism, unspecified     IGT (impaired glucose tolerance)     Mitral valve prolapse     Nontoxic multinodular goiter 02/18/2013    Primary hypothyroidism 05/18/2015    Shingles     left side of chest and back    Thyroid nodule 02/18/2013    Urinary tract infection       Past Surgical History:   Procedure Laterality Date    CATARACT EXTRACTION BILATERAL W/ ANTERIOR VITRECTOMY      DILATION AND CURETTAGE OF UTERUS      x2      Social History     Socioeconomic History    Marital status:    Occupational History    Occupation: Retired   Tobacco Use    Smoking status: Never    Smokeless tobacco: Never   Substance and Sexual Activity    Alcohol use: No     Alcohol/week: 0.0 standard drinks of alcohol    Drug use: Never    Sexual activity: Never   Social History Narrative    Her  passed away in March 2014. She lives with her  and has sitters and a son who helps. She has four children two of whom are twins. Her son is running the family business. Her children are all educated. She worked doing  for the [employer]. She has an education degree and is a retired . She started a travel agency for 15 years. She is a non-smoker and denies alcohol or substance abuse.                      Social Drivers of Health     Financial Resource Strain: Low Risk  (3/31/2025)    Overall Financial Resource Strain (CARDIA)     Difficulty of Paying Living Expenses: Not hard at all   Food Insecurity: No Food Insecurity (3/31/2025)    Hunger Vital Sign     Worried About Running Out of Food in the Last Year: Never true     Ran Out of Food in the Last Year: Never true   Transportation Needs: No Transportation Needs (3/31/2025)    PRAPARE - Transportation     Lack of Transportation (Medical): No     Lack of Transportation (Non-Medical): No   Physical Activity: Insufficiently Active (3/31/2025)    Exercise Vital Sign     Days of Exercise per Week: 4 days     Minutes of  Exercise per Session: 20 min   Stress: Patient Declined (3/31/2025)    [employer] [place] [employer] - Occupational Stress Questionnaire     Feeling of Stress : Patient declined   Housing Stability: Low Risk  (3/31/2025)    Housing Stability Vital Sign     Unable to Pay for Housing in the Last Year: No     Number of Times Moved in the Last Year: 0     Homeless in the Last Year: No      Family History   Problem Relation Name Age of Onset    Thyroid disease Father      Anemia Sister      Anemia Brother      Anemia Maternal Aunt      Thyroid disease Daughter      Thyroid disease Son      Diabetes Neg Hx      Breast cancer Neg Hx      Colon cancer Neg Hx      Ovarian cancer Neg Hx            Physical  Exam  Vitals:    04/01/25 1335   BP: (!) 96/52   BP Location: Left arm   Patient Position: Sitting   Pulse: 76   Temp: 97.7 °F (36.5 °C)   SpO2: 96%   Weight: 79.7 kg (175 lb 11.3 oz)      Body mass index is 28.36 kg/m².  Wt Readings from Last 3 Encounters:   04/01/25 79.7 kg (175 lb 11.3 oz)   12/12/24 76.7 kg (169 lb)   09/27/24 72.9 kg (160 lb 11.5 oz)         Physical Exam    Vitals: Blood pressure: 96/52.  General: No acute distress. Well-developed. Well-nourished.  Eyes: EOMI. Sclerae anicteric.  HENT: Normocephalic. Atraumatic. Nares patent. Moist oral mucosa.  Ears: Bilateral TMs clear. Bilateral EACs clear.  Cardiovascular: Regular rate. Regular rhythm. No murmurs. No rubs. No gallops. Normal S1, S2.  Respiratory: Normal respiratory effort. Clear to auscultation bilaterally. No rales. No rhonchi. No wheezing.  Abdomen: Soft. Non-tender. Non-distended. Normoactive bowel sounds.  Musculoskeletal: No  obvious deformity.  Extremities: No lower extremity edema.  Neurological: Alert & oriented x2. No slurred speech. In wheelchair for distance, uses walker at home (not brought to clinic)  Psychiatric: Normal mood. Normal affect. Good insight. Good judgment.  Skin: Warm. Dry. No rash.           Labs:    A1C:  Recent Labs   Lab 10/10/22  1416 10/13/23  1110 09/26/24  1012   Hemoglobin A1C 5.6 5.5 5.2     CBC:  Recent Labs   Lab 07/01/24  1053 08/07/24  1703 12/11/24  1128   WBC 10.02 11.29 10.12  10.12   RBC 3.80 L 4.03 4.24  4.24   Hemoglobin 11.7 L 12.4 12.9  12.9   Hematocrit 35.8 L 37.1 40.5  40.5   Platelets 275 246 245  245   MCV 94 92 96  96   MCH 30.8 30.8 30.4  30.4   MCHC 32.7 33.4 31.9 L  31.9 L     CMP:  Recent Labs   Lab 07/01/24  1053 08/07/24  1703 12/11/24  1128   Glucose 101 96 96  96   Calcium 9.7 9.4 9.5  9.5   Albumin 3.3 L 3.7 3.5  3.5   Total Protein 6.7 6.8 7.0  7.0   Sodium 139 136 139  139   Potassium 4.2 4.5 4.5  4.5   CO2 23 24 27  27   Chloride 104 104 103  103   BUN 23 27 25  25   Creatinine 1.0 0.9 0.9  0.9   Alkaline Phosphatase 80 79 98  98   ALT 11 13 15  15   AST 20 19 20  20   Total Bilirubin 0.3 0.3 0.4  0.4     LIPIDS:  Recent Labs   Lab 10/10/22  1415 10/13/23  1110 03/27/24  1123 07/01/24  1053 09/26/24  1012   TSH 2.090 2.550   < > 1.974  --    HDL 38 L 46  --   --  42   Cholesterol 179 189  --   --  168   Triglycerides 221 H 128  --   --  104   LDL Cholesterol 96.8 117.4  --   --  105.2   HDL/Cholesterol Ratio 21.2 24.3  --   --  25.0   Non-HDL Cholesterol 141 143  --   --  126   Total Cholesterol/HDL Ratio 4.7 4.1  --   --  4.0    < > = values in this interval not displayed.     TSH:  Recent Labs   Lab 03/27/24  1123 06/17/24  1155 07/01/24  1053   TSH 2.420 1.625 1.974              Assessment and Plan:  1. Moderate late onset Alzheimer's dementia without behavioral disturbance, psychotic disturbance, mood disturbance, or anxiety    2. Stage 3b chronic kidney disease    3. Anemia in stage 3b chronic kidney disease    4. Acquired hypothyroidism    5. Chronic pain of left knee    6. Physical deconditioning    7. Sensorineural hearing loss (SNHL) of both ears    8. Personal history of fall     Assessment & Plan      IMPRESSION:  - Overall  health status stable with no new medical problems or hospitalizations in past 6 months.  - Occasional falls but no significant injuries or contributing factors identified.  - Recent lab results stable and good values for age.    ALZHEIMER'S DISEASE WITH LATE ONSET:  - Continue current medication regimen, including Aricept for memory and Sertraline (Zoloft) for mood.  - Adjust Zoloft dosage to evening to manage daytime drowsiness.  - Monitor patient's agitation and anxiety in the evening, particularly the routine of locking doors and closing blinds.  - Observe patient's need for assistance with bathing and self-care.  - Evaluate patient's mobility with walker, performance of daily activities, and participation in cognitive exercises.  - Note that patient has experienced a few falls but recovers quickly.  - Maintain structured daily routine including physical activity, cognitive exercises, and household chores, as patient is able to perform some activities independently, including walking with a walker and participating in household chores.    STAGE 3B CHRONIC KIDNEY DISEASE:  - Continue nephrology follow-ups with the last visit in December showing good results. Recommended a one year follow up.   - Blood pressure measurement of 96/52 is consistent with previous reading of 97/45.  - Based on current results, no interim labs are needed.  - Schedule next nephrology appointment and labs for December.  - Advise patient to maintain current management plan with no changes needed.    LEFT KNEE OSTEOARTHRITIS:  - Monitor patient's left knee pain due to arthritis, particularly when transitioning from sitting to standing.  - Patient wears a brace when walking.  - Apply topical treatment to the knee when pain occurs.  - Encourage patient to continue regular walking exercises, completing 6 laps around a large house using a walker.    SEVERE DEMENTIA WITH BEHAVIORAL DISTURBANCE:  - Maintain patient's cognitive abilities through  various activities such as chopping vegetables, folding clothes, and watching game shows.  - Encourage family to keep patient awake and engaged during the day.     HEARING LOSS  - Continue use of hearing aids and regular audiologist visits.    GALLSTONES:  - Monitor patient's history of gallstones and discomfort when consuming rich or fatty foods.  - Manage patient's diet to avoid triggering gallstone-related symptoms.    DEPENDENCE ON ENABLING DEVICES:  - Maintain use of walk-in shower with bench for bathing assistance.    FOLLOW-UP AND MEDICATION MANAGEMENT:  - Family to message through portal if any medication refills are needed before next visit.  - Current medication regimen includes thyroid medication.              Follow up in about 6 months (around 10/1/2025).     This note was generated with the assistance of ambient listening technology. Verbal consent was obtained by the patient and accompanying visitor(s) for the recording of patient appointment to facilitate this note. I attest to having reviewed and edited the generated note for accuracy, though some syntax or spelling errors may persist. Please contact the author of this note for any clarification.     41 minutes of total time spent on the encounter, which includes face to face time and non-face to face time preparing to see the patient (eg, review of tests), Obtaining and/or reviewing separately obtained history, Documenting clinical information in the electronic or other health record, Independently interpreting results (not separately reported) and communicating results to the patient/family/caregiver, or Care coordination (not separately reported).       Helena Lamb, FNP-C  [facility]  4/1/25        [1]   Current Outpatient Medications on File Prior to Visit   Medication Sig Dispense Refill    ANTIOX#10/OM3/DHA/EPA/LUT/ZEAX (I-CAPS ORAL) Take 2 capsules by mouth once daily.      calcium-vitamin D (OSCAL) 250 (625)-125  mg-unit per tablet Take 1 tablet by mouth 2 (two) times daily.      donepeziL (ARICEPT) 5 MG tablet Take 1 tablet (5 mg total) by mouth once daily. 90 tablet 3    levothyroxine (SYNTHROID) 75 MCG tablet Take 1 tablet (75 mcg total) by mouth before breakfast. 90 tablet 2    multivit-iron-FA-calcium-mins 18 mg iron-400 mcg-500 mg Ca Tab Take 1 tablet by mouth once daily.      ofloxacin (FLOXIN) 0.3 % otic solution Place 3 drops in affected ear bid x 10 days. 5 mL 0    prednisoLONE acetate (PRED FORTE) 1 % DrpS Apply 2 drops  to affected ear BID x 10 days 10 mL 2    sertraline (ZOLOFT) 100 MG tablet Take 1 tablet (100 mg total) by mouth once daily. 90 tablet 3    [DISCONTINUED] clotrimazole (LOTRIMIN) 1 % Soln Apply 3-5 drops to affected ear twice daily x 10 days 10 mL 0     No current facility-administered medications on file prior to visit.

## 2025-05-14 ENCOUNTER — OFFICE VISIT (OUTPATIENT)
Dept: FAMILY MEDICINE | Facility: CLINIC | Age: OVER 89
End: 2025-05-14
Payer: MEDICARE

## 2025-05-14 VITALS
DIASTOLIC BLOOD PRESSURE: 60 MMHG | SYSTOLIC BLOOD PRESSURE: 108 MMHG | WEIGHT: 178.44 LBS | BODY MASS INDEX: 28.68 KG/M2 | HEIGHT: 66 IN | OXYGEN SATURATION: 96 % | HEART RATE: 83 BPM

## 2025-05-14 DIAGNOSIS — R40.0 SOMNOLENCE: ICD-10-CM

## 2025-05-14 DIAGNOSIS — R31.9 HEMATURIA, UNSPECIFIED TYPE: ICD-10-CM

## 2025-05-14 DIAGNOSIS — R29.6 FALLS: ICD-10-CM

## 2025-05-14 DIAGNOSIS — R10.9 RIGHT FLANK PAIN: Primary | ICD-10-CM

## 2025-05-14 DIAGNOSIS — M19.90 OSTEOARTHRITIS, UNSPECIFIED OSTEOARTHRITIS TYPE, UNSPECIFIED SITE: ICD-10-CM

## 2025-05-14 LAB
BILIRUB SERPL-MCNC: ABNORMAL MG/DL
BLOOD URINE, POC: ABNORMAL
COLOR, POC UA: YELLOW
GLUCOSE UR QL STRIP: ABNORMAL
KETONES UR QL STRIP: ABNORMAL
LEUKOCYTE ESTERASE URINE, POC: ABNORMAL
NITRITE, POC UA: ABNORMAL
PH, POC UA: 6
PROTEIN, POC: 30
SPECIFIC GRAVITY, POC UA: 1.02
UROBILINOGEN, POC UA: 0.2

## 2025-05-14 PROCEDURE — 3288F FALL RISK ASSESSMENT DOCD: CPT | Mod: CPTII,S$GLB,, | Performed by: NURSE PRACTITIONER

## 2025-05-14 PROCEDURE — 1160F RVW MEDS BY RX/DR IN RCRD: CPT | Mod: CPTII,S$GLB,, | Performed by: NURSE PRACTITIONER

## 2025-05-14 PROCEDURE — 1100F PTFALLS ASSESS-DOCD GE2>/YR: CPT | Mod: CPTII,S$GLB,, | Performed by: NURSE PRACTITIONER

## 2025-05-14 PROCEDURE — 1159F MED LIST DOCD IN RCRD: CPT | Mod: CPTII,S$GLB,, | Performed by: NURSE PRACTITIONER

## 2025-05-14 PROCEDURE — 99214 OFFICE O/P EST MOD 30 MIN: CPT | Mod: S$GLB,,, | Performed by: NURSE PRACTITIONER

## 2025-05-14 PROCEDURE — 81001 URINALYSIS AUTO W/SCOPE: CPT | Mod: S$GLB,,, | Performed by: NURSE PRACTITIONER

## 2025-05-14 PROCEDURE — 99999 PR PBB SHADOW E&M-EST. PATIENT-LVL IV: CPT | Mod: PBBFAC,,, | Performed by: NURSE PRACTITIONER

## 2025-05-14 PROCEDURE — 1125F AMNT PAIN NOTED PAIN PRSNT: CPT | Mod: CPTII,S$GLB,, | Performed by: NURSE PRACTITIONER

## 2025-05-14 RX ORDER — MELOXICAM 7.5 MG/1
7.5 TABLET ORAL DAILY
Qty: 90 TABLET | Refills: 0 | Status: SHIPPED | OUTPATIENT
Start: 2025-05-14

## 2025-05-14 RX ORDER — NEOMYCIN SULFATE, POLYMYXIN B SULFATE, AND DEXAMETHASONE 3.5; 10000; 1 MG/G; [USP'U]/G; MG/G
OINTMENT OPHTHALMIC
COMMUNITY
Start: 2025-01-10

## 2025-05-14 RX ORDER — NITROFURANTOIN 25; 75 MG/1; MG/1
100 CAPSULE ORAL 2 TIMES DAILY
Qty: 14 CAPSULE | Refills: 0 | Status: SHIPPED | OUTPATIENT
Start: 2025-05-14

## 2025-05-14 RX ORDER — ERYTHROMYCIN 5 MG/G
OINTMENT OPHTHALMIC
COMMUNITY
Start: 2025-02-13

## 2025-05-14 NOTE — PROGRESS NOTES
Patient ID: Chloe Foster is a 89 y.o. female.     Chief Complaint: Follow-up (Pain in her right side, left knee pain, and back pain.)      HPI:  History of Present Illness    CHIEF COMPLAINT:  Patient presents today with back and side pain x 1 week.     FALLS AND SAFETY:  She has experienced two falls this month in the bathroom during nighttime hours. During the most recent fall on Thursday, she sustained a scraped and bleeding elbow. In both instances, she was found sitting on her buttocks by her son, who lives with her. She reports trying to ambulate quickly to the bathroom during the night and slipping on urine leakage. She does wear Depends but occasionally removes to use the bathroom. She has a history of dementia. Daughter denies any bruising or signs of trauma injury.     MOBILITY AND PAIN:  She ambulates with a walker and reports deteriorating ambulation due to pain with decreased endurance and increased effort required for mobility. She has ongoing left knee pain previously evaluated by orthopedist. Prior treatments included injections providing temporary relief, topical medications, and a knee brace, which she finds difficult to wear due to prolonged sitting. She takes Tylenol and Advil for pain management. The knee pain affects her ability to transition from sitting to standing. Daughter who is an OT reports pain is limiting her mobility and ability to perform ADLS.     URINARY SYMPTOMS:  She has a history of urinary tract infections and experiences nocturnal urinary incontinence when getting up to use the bathroom. She inconsistently uses Depends at night for management. Daughter reports it has been awhile since last UTI. Patient denies any urinary changes but does report she doesn't feel well in the past few days. She has hiram more somnolent and fatigued.     MENTAL STATUS:  She reports significant somnolence, which has increased recently.  Her caregiver notes that she is usually somnolent  during the day but does seem to be some worse.       ROS:  General: -fever, -chills, +fatigue, -weight gain, -weight loss  Eyes: -vision changes, -redness, -discharge  ENT: -ear pain, -nasal congestion, -sore throat  Cardiovascular: -chest pain, -palpitations, -lower extremity edema  Respiratory: -cough, -shortness of breath  Gastrointestinal: -abdominal pain, -nausea, -vomiting, -diarrhea, -constipation, -blood in stool  Genitourinary: -dysuria, -hematuria, -frequency, +urinary incontinence  Musculoskeletal: +joint pain, -muscle pain, +back pain, +exercise intolerance, +diminished activity, +limb pain  Skin: -rash, -lesion  Neurological: -headache, -dizziness, -numbness, -tingling, +excessive drowsiness, +memory loss  Psychiatric: -anxiety, -depression, -sleep difficulty            Active Problem List with Overview Notes    Diagnosis Date Noted    Personal history of fall 04/01/2025    Aortic atherosclerosis 09/27/2024    Stage 3b chronic kidney disease 08/17/2024    Anemia in stage 3b chronic kidney disease 08/17/2024    Iron deficiency anemia 08/17/2024    H/O: hematuria 08/17/2024    Vitamin D deficiency 08/17/2024    Seasonal allergic rhinitis 04/10/2023    Fatigue 10/10/2022    Physical deconditioning 09/12/2022    Moderate late onset Alzheimer's dementia without behavioral disturbance, psychotic disturbance, mood disturbance, or anxiety 09/12/2022    Paresthesia of both hands 10/17/2021    Dysthymia 04/15/2021    Chronic pain of left knee 10/15/2020    Acquired hypothyroidism 05/18/2015    Recurrent UTI 05/05/2015    Bladder cystocele 05/05/2015    Hearing loss, sensorineural 02/24/2015    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     PMH:  Past Medical History:   Diagnosis Date    Anemia in stage 3b chronic kidney disease 8/17/2024    Anxiety     Black eye 05/06/2013    Dementia     Dementia without behavioral disturbance 04/15/2021    Dyslipidemia     Essential hypertension 10/10/2022    Hyperthyroidism     Hypothyroidism, unspecified     IGT (impaired glucose tolerance)     Mitral valve prolapse     Nontoxic multinodular goiter 02/18/2013    Primary hypothyroidism 05/18/2015    Shingles     left side of chest and back    Thyroid nodule 02/18/2013    Urinary tract infection       Past Surgical History:   Procedure Laterality Date    CATARACT EXTRACTION BILATERAL W/ ANTERIOR VITRECTOMY      DILATION AND CURETTAGE OF UTERUS      x2      Social History     Socioeconomic History    Marital status:    Occupational History    Occupation: Retired   Tobacco Use    Smoking status: Never    Smokeless tobacco: Never   Substance and Sexual Activity    Alcohol use: No     Alcohol/week: 0.0 standard drinks of alcohol    Drug use: Never    Sexual activity: Never   Social History Narrative    Her  passed away in March 2014. She lives with her  and has sitters and a son who helps. She has four children two of whom are twins. Her son is running the family business. Her children are all educated. She worked doing  for the [employer]. She has an education degree and is a retired . She started a travel agency for 15 years. She is a non-smoker and denies alcohol or substance abuse.                      Social Drivers of Health     Financial Resource Strain: Low Risk  (3/31/2025)    Overall Financial  "Resource Strain (CARDIA)     Difficulty of Paying Living Expenses: Not hard at all   Food Insecurity: No Food Insecurity (3/31/2025)    Hunger Vital Sign     Worried About Running Out of Food in the Last Year: Never true     Ran Out of Food in the Last Year: Never true   Transportation Needs: No Transportation Needs (3/31/2025)    PRAPARE - Transportation     Lack of Transportation (Medical): No     Lack of Transportation (Non-Medical): No   Physical Activity: Insufficiently Active (3/31/2025)    Exercise Vital Sign     Days of Exercise per Week: 4 days     Minutes of Exercise per Session: 20 min   Stress: Patient Declined (3/31/2025)    [employer] [place] [employer] - Occupational Stress Questionnaire     Feeling of Stress : Patient declined   Housing Stability: Low Risk  (3/31/2025)    Housing Stability Vital Sign     Unable to Pay for Housing in the Last Year: No     Number of Times Moved in the Last Year: 0     Homeless in the Last Year: No      Family History   Problem Relation Name Age of Onset    Thyroid disease Father      Anemia Sister      Anemia Brother      Anemia Maternal Aunt      Thyroid disease Daughter      Thyroid disease Son      Diabetes Neg Hx      Breast cancer Neg Hx      Colon cancer Neg Hx      Ovarian cancer Neg Hx            Physical  Exam  Vitals:    05/14/25 1308   BP: 108/60   Pulse: 83   SpO2: 96%   Weight: 81 kg (178 lb 7.4 oz)   Height: 5' 6" (1.676 m)      Body mass index is 28.8 kg/m².  Wt Readings from Last 3 Encounters:   05/14/25 81 kg (178 lb 7.4 oz)   04/01/25 79.7 kg (175 lb 11.3 oz)   12/12/24 76.7 kg (169 lb)         Physical Exam    General: No acute distress. Well-developed. Well-nourished.  Eyes: EOMI. Sclerae anicteric.  HENT: Normocephalic. Atraumatic. Nares patent. Moist oral mucosa..  Cardiovascular: Regular rate. Regular rhythm. No murmurs. No rubs. No gallops. Normal S1, S2.  Respiratory: Normal respiratory effort. Clear to auscultation bilaterally. No " rales. No rhonchi. No wheezing.  Abdomen: Soft. Non-tender. Non-distended. Normoactive bowel sounds.  Musculoskeletal: No obvious deformity. No thoracic/ lumbar spine tenderness Tenderness right lateral lower rib cage area. Pain with Rom in the right lateral back/ flank area   Extremities: No lower extremity edema.  Neurological: Alert & oriented x3. No slurred speech. Normal gait.  Psychiatric: Normal mood. Normal affect. Good insight. Good judgment.  Skin: Warm. Dry. No rash.          Labs:    A1C:  Recent Labs   Lab 10/10/22  1416 10/13/23  1110 09/26/24  1012   Hemoglobin A1C 5.6 5.5 5.2     CBC:  Recent Labs   Lab 07/01/24  1053 08/07/24  1703 12/11/24  1128   WBC 10.02 11.29 10.12  10.12   RBC 3.80 L 4.03 4.24  4.24   Hemoglobin 11.7 L 12.4 12.9  12.9   Hematocrit 35.8 L 37.1 40.5  40.5   Platelets 275 246 245  245   MCV 94 92 96  96   MCH 30.8 30.8 30.4  30.4   MCHC 32.7 33.4 31.9 L  31.9 L     CMP:  Recent Labs   Lab 07/01/24  1053 08/07/24  1703 12/11/24  1128   Glucose 101 96 96  96   Calcium 9.7 9.4 9.5  9.5   Albumin 3.3 L 3.7 3.5  3.5   Total Protein 6.7 6.8 7.0  7.0   Sodium 139 136 139  139   Potassium 4.2 4.5 4.5  4.5   CO2 23 24 27  27   Chloride 104 104 103  103   BUN 23 27 25  25   Creatinine 1.0 0.9 0.9  0.9   Alkaline Phosphatase 80 79 98  98   ALT 11 13 15  15   AST 20 19 20  20   Total Bilirubin 0.3 0.3 0.4  0.4     LIPIDS:  Recent Labs   Lab 10/10/22  1415 10/13/23  1110 03/27/24  1123 07/01/24  1053 09/26/24  1012   TSH 2.090 2.550   < > 1.974  --    HDL 38 L 46  --   --  42   Cholesterol 179 189  --   --  168   Triglycerides 221 H 128  --   --  104   LDL Cholesterol 96.8 117.4  --   --  105.2   HDL/Cholesterol Ratio 21.2 24.3  --   --  25.0   Non-HDL Cholesterol 141 143  --   --  126   Total Cholesterol/HDL Ratio 4.7 4.1  --   --  4.0    < > = values in this interval not displayed.     TSH:  Recent Labs   Lab 03/27/24  1123 06/17/24  1155 07/01/24  1053   TSH 2.420  1.625 1.974              Assessment and Plan:  1. Right flank pain  - POCT URINE DIPSTICK WITH MICROSCOPE, AUTOMATED  - X-Ray Spine 1 View Any Level; Future  - X-Ray Ribs 2 View Right; Future    2. Falls    3. Osteoarthritis, unspecified osteoarthritis type, unspecified site  - meloxicam (MOBIC) 7.5 MG tablet; Take 1 tablet (7.5 mg total) by mouth once daily.  Dispense: 90 tablet; Refill: 0    4. Hematuria, unspecified type  - nitrofurantoin, macrocrystal-monohydrate, (MACROBID) 100 MG capsule; Take 1 capsule (100 mg total) by mouth 2 (two) times daily.  Dispense: 14 capsule; Refill: 0    5. Somnolence     Assessment & Plan      REPEATED FALLS:  - Patient has fallen twice recently, once earlier this month and once on Thursday night when slipping in the bathroom.  - Assessed falls and ambulation issues, considering UTI as potential cause.  - Patient sustained an elbow abrasion with bleeding during one fall.  - Ordered XR Ribs and Thoracic/Lumbar Spine to evaluate for possible rib fracture, spinal injury, or other fractures related to falls.  - Patient reports back and side pain possibly related to these incidents.  - Noted that patient leaks urine at night when getting up to use the bathroom, which contributes to falls.  - Advised patient to wear adult diapers at night to prevent slipping on urine.    AMBULATION ISSUES:  - Patient's ambulation has deteriorated due to pain, requiring increased effort to walk even with assistive device.  - Instructed patient to continue using walker for all ambulation activities.    OSTEOARTHRITIS OF LEFT KNEE:  - Monitored ongoing left knee pain.  - Patient has seen an orthopedist but reports that injections do not provide lasting relief.  - Initiated low-dose meloxicam daily for osteoarthritis pain management with continued Tylenol as needed.  - Carefully weighed benefits of pain management against potential renal impacts, considering patient's age and mobility concerns.    URINARY  TRACT INFECTION:  - Patient has history of UTIs and is currently somnolent, which may indicate an active infection.  - This may also contribute to recent falls.  - Ordered urinalysis and urine culture if sample can be obtained  - Able to give small amount of urine, UA run but not enough for culture.   - Recommend increasing water intake.  - If no improvement or worsening will need to get urine for culture     SOMNOLENCE:  - Monitored patient's worsening daytime sleepiness and somnolence, which represents a change from her normal state.      PAIN MANAGEMENT:  - Patient has been using Tylenol and Advil for pain management with inadequate relief.  - Initiated low-dose meloxicam daily for better pain control, particularly for osteoarthritis, with continued Tylenol as needed.  - Recommend increasing water intake to support renal function while on NSAID therapy.          Follow up if symptoms worsen or fail to improve.     This note was generated with the assistance of ambient listening technology. Verbal consent was obtained by the patient and accompanying visitor(s) for the recording of patient appointment to facilitate this note. I attest to having reviewed and edited the generated note for accuracy, though some syntax or spelling errors may persist. Please contact the author of this note for any clarification.       Helena Lamb, UMAIRP-C  [facility] [place]  25        [1]   Current Outpatient Medications on File Prior to Visit   Medication Sig Dispense Refill    ANTIOX#10/OM3/DHA/EPA/LUT/ZEAX (I-CAPS ORAL) Take 2 capsules by mouth once daily.      calcium-vitamin D (OSCAL) 250 (625)-125 mg-unit per tablet Take 1 tablet by mouth 2 (two) times daily.      donepeziL (ARICEPT) 5 MG tablet Take 1 tablet (5 mg total) by mouth once daily. 90 tablet 3    erythromycin (ROMYCIN) ophthalmic ointment SMARTSI.25 Inch(es) Right Eye Twice Daily      levothyroxine (SYNTHROID) 75 MCG tablet Take 1 tablet (75 mcg  total) by mouth before breakfast. 90 tablet 2    multivit-iron-FA-calcium-mins 18 mg iron-400 mcg-500 mg Ca Tab Take 1 tablet by mouth once daily.      neomycin-polymyxin-dexamethasone (DEXACINE) 3.5 mg/g-10,000 unit/g-0.1 % Oint SMARTSI.25 Inch(es) Right Eye Twice Daily      ofloxacin (FLOXIN) 0.3 % otic solution Place 3 drops in affected ear bid x 10 days. 5 mL 0    prednisoLONE acetate (PRED FORTE) 1 % DrpS Apply 2 drops  to affected ear BID x 10 days 10 mL 2    sertraline (ZOLOFT) 100 MG tablet Take 1 tablet (100 mg total) by mouth once daily. 90 tablet 3     No current facility-administered medications on file prior to visit.

## 2025-05-15 ENCOUNTER — RESULTS FOLLOW-UP (OUTPATIENT)
Dept: FAMILY MEDICINE | Facility: CLINIC | Age: OVER 89
End: 2025-05-15

## 2025-07-21 ENCOUNTER — OFFICE VISIT (OUTPATIENT)
Dept: OTOLARYNGOLOGY | Facility: CLINIC | Age: OVER 89
End: 2025-07-21
Payer: MEDICARE

## 2025-07-21 ENCOUNTER — CLINICAL SUPPORT (OUTPATIENT)
Dept: OTOLARYNGOLOGY | Facility: CLINIC | Age: OVER 89
End: 2025-07-21
Payer: MEDICARE

## 2025-07-21 VITALS
DIASTOLIC BLOOD PRESSURE: 78 MMHG | WEIGHT: 177 LBS | SYSTOLIC BLOOD PRESSURE: 133 MMHG | BODY MASS INDEX: 28.57 KG/M2 | HEART RATE: 79 BPM

## 2025-07-21 DIAGNOSIS — Z97.4 WEARS HEARING AID IN BOTH EARS: ICD-10-CM

## 2025-07-21 DIAGNOSIS — H72.91 PERFORATION OF RIGHT TYMPANIC MEMBRANE: ICD-10-CM

## 2025-07-21 DIAGNOSIS — H90.A31 MIXED CONDUCTIVE AND SENSORINEURAL HEARING LOSS OF RIGHT EAR WITH RESTRICTED HEARING OF LEFT EAR: ICD-10-CM

## 2025-07-21 DIAGNOSIS — H90.A22 SENSORINEURAL HEARING LOSS (SNHL) OF LEFT EAR WITH RESTRICTED HEARING OF RIGHT EAR: ICD-10-CM

## 2025-07-21 DIAGNOSIS — H92.11 OTORRHEA OF RIGHT EAR: ICD-10-CM

## 2025-07-21 DIAGNOSIS — H90.A31 MIXED CONDUCTIVE AND SENSORINEURAL HEARING LOSS OF RIGHT EAR WITH RESTRICTED HEARING OF LEFT EAR: Primary | ICD-10-CM

## 2025-07-21 DIAGNOSIS — H61.23 BILATERAL IMPACTED CERUMEN: Primary | ICD-10-CM

## 2025-07-21 PROCEDURE — 92567 TYMPANOMETRY: CPT | Mod: S$GLB,,,

## 2025-07-21 PROCEDURE — 99999 PR PBB SHADOW E&M-EST. PATIENT-LVL III: CPT | Mod: PBBFAC,,, | Performed by: NURSE PRACTITIONER

## 2025-07-21 PROCEDURE — 92557 COMPREHENSIVE HEARING TEST: CPT | Mod: S$GLB,,,

## 2025-07-21 PROCEDURE — 99999 PR PBB SHADOW E&M-EST. PATIENT-LVL I: CPT | Mod: PBBFAC,,,

## 2025-07-21 PROCEDURE — 87077 CULTURE AEROBIC IDENTIFY: CPT | Performed by: NURSE PRACTITIONER

## 2025-07-21 NOTE — PROCEDURES
Ear Cerumen Removal    Date/Time: 7/21/2025 1:40 PM    Performed by: Martina Knight NP  Authorized by: Martina Knight NP    Consent Done?:  Yes (Verbal)    Local anesthetic:  None  Location details:  Both ears  Procedure type: curette    Procedure type comment:  Suction  Cerumen  Removal Results:  Cerumen completely removed  Patient tolerance:  Patient tolerated the procedure well with no immediate complications

## 2025-07-21 NOTE — PROGRESS NOTES
Chief Complaint   Patient presents with    Cerumen Impaction     Annual cleaning     Ear Problem     Left ear paining and also light drainage    .     HPI 5/1/2023: Chloe Foster is a 89 y.o. female who is referred to me by Dr. Denise Guzman* in consultation for possible ear infection. Son notices patient picking at her ears. Patient denies pain and itchiness. Chloe has had approximately numerous episodes of otitis externa in the past several years. The infections typically affect the right ear and are typically manifested by ear drainage.  The last ear infection was 2 months ago.  Her nasal symptoms consist of clear rhinorrhea.      She was started on ofloxacin and Pred Forte drops on 4/26/2023 for right ear drainage. She went for hearing aid fitting on 4/28/2023 and audiologist noted drainage in her right ear.     Interval HPI 5/17/2023:  Follow up visit. She is here with her daughter today. States she is doing well. Denies ear pain and drainage. She recently had new hearing aids and is hearing better. No complaints today. Ear culture on 5/1/2023 was positive for aspergillus flavus. She has completed clotrimazole otic drops x 14 days.     Interval HPI 10/31/2023:  Follow up visit.  She had seen Michelle Leo, audiology, on 10/24/23 for hearing aid check and repair. Mrs. Leo noticed redness and irritation in her right ear after cerumen removal.  The patient is here to evaluate for possible ear infection. She has a hx of right perforated TM. Her son states that he has started ofloxacin and Pred Forte from previous ear infection and has started applying the drops in her right ear twice a day for 5 days now. Denies ear pain and drainage.     Interval HPI 3/21/2024:  Follow up visit. She is here today with her daughter and son-in-law. Per her daughter, she has been having drainage and flaky skin in the right ear. The patient's son has started on the ear drops a couple days ago. She denies ear pain.  She has a history of hearing loss and is wearing bilateral hearing aids.     Interval HPI 3/26/2024:  Follow up visit. Ms. Corona is here today with her son. She has no complaints today. Denies pain and drainage. Ear culture was positive for candida parapsilosis and aspergillus flavus. She has been using the clotrimazole and Pred Forte otic drops.     Interval HPI 6/13/2024:  She is here today with her daughter for a routine cerumen removal. Denies ear pain and drainage. Her daughter states that the patient is having more trouble with hearing. She wears bilateral hearing aids.     Interval HPI 7/26/2024:  Ms. Corona is here today with her son. Her son reports that she has been having drainage in the right ear. Denies ear pain. She wears bilateral hearing aids.     Interval HPI 10/2/2024:  She is here today with her son, Mr. Anderson. Mr. Anderson reports of right ear drainage that started last week. He started using ofloxacin with some improvement. Her son reports that she picks at her right ear constantly. She wears bilateral hearing aids.     Interval HPI 7/21/2025:  Patient presents today for ear cleaning. Her son reports right ear discomfort that began yesterday evening. The ear was noted to be wet when checked with a Q-tip last night, possibly from face washing. She currently experiences mild itching in the right ear. She wears bilateral hearing aids. CURRENT EAR CARE:  She continues cleaning her ears with alcohol which she reports has been helpful in managing symptoms.      Past Medical History:   Diagnosis Date    Anemia in stage 3b chronic kidney disease 8/17/2024    Anxiety     Black eye 05/06/2013    Dementia     Dementia without behavioral disturbance 04/15/2021    Dyslipidemia     Essential hypertension 10/10/2022    Hyperthyroidism     Hypothyroidism, unspecified     IGT (impaired glucose tolerance)     Mitral valve prolapse     Nontoxic multinodular goiter 02/18/2013    Primary hypothyroidism 05/18/2015     Shingles     left side of chest and back    Thyroid nodule 02/18/2013    Urinary tract infection      Social History     Socioeconomic History    Marital status:    Occupational History    Occupation: Retired   Tobacco Use    Smoking status: Never    Smokeless tobacco: Never   Substance and Sexual Activity    Alcohol use: No     Alcohol/week: 0.0 standard drinks of alcohol    Drug use: Never    Sexual activity: Never   Social History Narrative    Her  passed away in March 2014. She lives with her  and has sitters and a son who helps. She has four children two of whom are twins. Her son is running the family business. Her children are all educated. She worked doing  for the [employer]. She has an education degree and is a retired . She started a travel agency for 15 years. She is a non-smoker and denies alcohol or substance abuse.                      Social Drivers of Health     Financial Resource Strain: Low Risk  (3/31/2025)    Overall Financial Resource Strain (CARDIA)     Difficulty of Paying Living Expenses: Not hard at all   Food Insecurity: No Food Insecurity (3/31/2025)    Hunger Vital Sign     Worried About Running Out of Food in the Last Year: Never true     Ran Out of Food in the Last Year: Never true   Transportation Needs: No Transportation Needs (3/31/2025)    PRAPARE - Transportation     Lack of Transportation (Medical): No     Lack of Transportation (Non-Medical): No   Physical Activity: Insufficiently Active (3/31/2025)    Exercise Vital Sign     Days of Exercise per Week: 4 days     Minutes of Exercise per Session: 20 min   Stress: Patient Declined (3/31/2025)    [employer] [place] [employer] - Occupational Stress Questionnaire     Feeling of Stress : Patient declined   Housing Stability: Low Risk  (3/31/2025)    Housing Stability Vital Sign     Unable to Pay for Housing in the Last Year: No     Number of Times Moved in the Last Year: 0      Homeless in the Last Year: No     Past Surgical History:   Procedure Laterality Date    CATARACT EXTRACTION BILATERAL W/ ANTERIOR VITRECTOMY      DILATION AND CURETTAGE OF UTERUS      x2     Family History   Problem Relation Name Age of Onset    Thyroid disease Father      Anemia Sister      Anemia Brother      Anemia Maternal Aunt      Thyroid disease Daughter      Thyroid disease Son      Diabetes Neg Hx      Breast cancer Neg Hx      Colon cancer Neg Hx      Ovarian cancer Neg Hx             Review of Systems  General: negative for chills, fever or weight loss  Psychological: negative for mood changes or depression  Ophthalmic: negative for blurry vision, photophobia or eye pain  ENT: see HPI  Respiratory: no cough, shortness of breath, or wheezing  Cardiovascular: no chest pain or dyspnea on exertion  Gastrointestinal: no abdominal pain, change in bowel habits, or black/ bloody stools  Musculoskeletal: negative for gait disturbance or muscular weakness  Neurological: no syncope or seizures; no ataxia  Dermatological: negative for pruritis,  rash and jaundice  Hemotologic/Lymphatic: no new adenopathy, no easy bruising or bleeding      Physical Exam:    Vitals:    07/21/25 1342   BP: 133/78   Pulse: 79           Constitutional: Well appearing / communicating without difficutly.  NAD.  Eyes: EOM I Bilaterally  Head/Face: Normocephalic.  Negative paranasal sinus pressure/tenderness.  Salivary glands WNL.  House Brackmann I Bilaterally.    Right Ear: Auricle WNL. EAC with cerumen and scant amount of serous drainage. EAC within normal limits no drainage, edema, erythema, lesions or masses,TM perforation   Left Ear: Auricle WNL. EAC with cerumen impaction. EAC within normal limits no lesions or masses,TM w/o masses/lesions/perforations. TM mobility noted.  Nose: No gross nasal septal deviation. Inferior Turbinates 3+ bilaterally. No septal perforation. No masses/lesions. External nasal skin appears normal without  masses/lesions.  Oral Cavity: Gingiva/lips within normal limits.  Dentition/gingiva healthy appearing. Mucus membranes moist. Floor of mouth soft, no masses palpated. Oral Tongue mobile. Hard Palate appears normal.    Oropharynx: Base of tongue appears normal. No masses/lesions noted. Tonsillar fossa/pharyngeal wall without lesions. Posterior oropharynx WNL.  Soft palate without masses. Midline uvula.   Neck/Lymphatic: No LAD I-VI bilaterally.  No thyromegaly.  No masses noted on exam.    Mirror laryngoscopy/nasopharyngoscopy: Active gag reflex.  Unable to perform.    Neuro/Psychiatric: AOx3.  Normal mood and affect.   Cardiovascular: Normal carotid pulses bilaterally, no increasing jugular venous distention noted at cervical region bilaterally.    Respiratory: Normal respiratory effort, no stridor, no retractions noted.    Ear Cerumen Removal    Date/Time: 7/21/2025 1:40 PM    Performed by: Martina Knight, NP  Authorized by: Martina Knight, NP    Consent Done?:  Yes (Verbal)    Local anesthetic:  None  Location details:  Both ears  Procedure type: curette    Procedure type comment:  Suction  Cerumen  Removal Results:  Cerumen completely removed  Patient tolerance:  Patient tolerated the procedure well with no immediate complications        Audiogram interpreted personally by me and discussed in detail with the patient today.   Pure tone testing revealed severe sloping to profound mixed hearing loss in the right ear and moderate sloping to profound sensorineural hearing loss in the left ear. Speech reception thresholds were obtained at 65 dBHL in the right ear and 55 dBHL in the left ear. Speech discrimination scores were 80% in the right ear and 52% in the left ear. Tympanometry revealed type Ad tympanogram in the left ear (compliance 2.29 ml).               Assessment:    ICD-10-CM ICD-9-CM    1. Bilateral impacted cerumen  H61.23 380.4 Ear Cerumen Removal      2. Sensorineural hearing loss (SNHL) of left ear  with restricted hearing of right ear  H90.A22 389.22       3. Mixed conductive and sensorineural hearing loss of right ear with restricted hearing of left ear  H90.A31 389.22       4. Otorrhea of right ear  H92.11 388.60 Aerobic culture      5. Wears hearing aid in both ears  Z97.4 [id number]       6. Perforation of right tympanic membrane  H72.91 384.20               The primary encounter diagnosis was Bilateral impacted cerumen. Diagnoses of Sensorineural hearing loss (SNHL) of left ear with restricted hearing of right ear, Mixed conductive and sensorineural hearing loss of right ear with restricted hearing of left ear, Otorrhea of right ear, Wears hearing aid in both ears, and Perforation of right tympanic membrane were also pertinent to this visit.      Plan:  Orders Placed This Encounter   Procedures    Ear Cerumen Removal    Aerobic culture       IMPACTED CERUMEN:  - Evaluated wax buildup in ears.  -Cerumen impaction:  Removed under microscopy today without difficulty.  I would recommend the use of a wax softening drop, either over the counter Debrox or mineral oil, on a weekly basis.  I also instructed the patient to avoid Qtips.    OTORRHEA in RIGHT EAR  -obtained ear culture. Rx pending results    SNHL in BILATERAL EARS  -continue wearing hearing aids  - Patient to clean hearing aids consistently with alcohol.    FOLLOW-UP:  - 1 year or sooner as needed            Martina Knight NP    This note was generated with the assistance of ambient listening technology. Verbal consent was obtained by the patient and accompanying visitor(s) for the recording of patient appointment to facilitate this note. I attest to having reviewed and edited the generated note for accuracy, though some syntax or spelling errors may persist. Please contact the author of this note for any clarification.        Answers submitted by the patient for this visit:  Review of Symptoms Questionnaire  (Submitted on 7/14/2025)  None of these:  Yes  hearing loss: Yes  eye itching: Yes  None of these: Yes  None of these : Yes  None of these: Yes  None of these: Yes  back pain: Yes  None of these : Yes  None of these: Yes  None of these : Yes  dizziness: Yes  headaches: Yes  None of these: Yes  nervous/ anxious: Yes

## 2025-07-21 NOTE — PROGRESS NOTES
Chloe Foster, a 89 y.o. female was seen today in the clinic for annual audiologic evaluation following ear cleaning. Ms. Foster has history of perforation of the right tympanic membrane. She reports no concern with current amplification. No tinnitus, ear pain, or dizziness were reported.    Pure tone testing revealed severe sloping to profound mixed hearing loss in the right ear and moderate sloping to profound sensorineural hearing loss in the left ear. Speech reception thresholds were obtained at 65 dBHL in the right ear and 55 dBHL in the left ear. Speech discrimination scores were 80% in the right ear and 52% in the left ear. Tympanometry revealed type Ad tympanogram in the left ear (compliance 2.29 ml). Seal could not be obtained in the right ear.    Recommendations:  Annual audiologic evaluation  Hearing protection in noise  Continue use of amplification  Hearing aid follow up as needed

## 2025-07-24 ENCOUNTER — RESULTS FOLLOW-UP (OUTPATIENT)
Dept: OTOLARYNGOLOGY | Facility: CLINIC | Age: OVER 89
End: 2025-07-24
Payer: MEDICARE

## 2025-07-24 ENCOUNTER — TELEPHONE (OUTPATIENT)
Dept: OTOLARYNGOLOGY | Facility: CLINIC | Age: OVER 89
End: 2025-07-24
Payer: MEDICARE

## 2025-07-24 LAB
BACTERIA SPEC AEROBE CULT: ABNORMAL
BACTERIA SPEC AEROBE CULT: ABNORMAL

## 2025-07-24 RX ORDER — CIPROFLOXACIN AND DEXAMETHASONE 3; 1 MG/ML; MG/ML
4 SUSPENSION/ DROPS AURICULAR (OTIC) 2 TIMES DAILY
Qty: 7.5 ML | Refills: 1 | Status: SHIPPED | OUTPATIENT
Start: 2025-07-24 | End: 2025-08-03

## 2025-07-24 NOTE — PROGRESS NOTES
Please let Ms. Foster's son, Mr. Anderson, know that the culture was positive for 2 infections (klebsiella and Morganella morganni). Both are susceptible to Ciprodex. Please ask him if he needs me to send the rx for Ciprodex to the pharmacy?

## 2025-07-24 NOTE — TELEPHONE ENCOUNTER
Called and LVM with Justin . Ear culture results given as per Martina Knight NP. Also asked patient to call and let us know the correct pharmacy in we need to send message. Will also message thru My Chart.

## 2025-07-24 NOTE — TELEPHONE ENCOUNTER
----- Message from Martina Knight NP sent at 7/24/2025 11:23 AM CDT -----  Please let Ms. Foster's son, Mr. Anderson, know that the culture was positive for 2 infections (klebsiella and Morganella morganni). Both are susceptible to Ciprodex. Please ask him if he needs me to   send the rx for Ciprodex to the pharmacy?   ----- Message -----  From: Lab, Background User  Sent: 7/23/2025   9:47 AM CDT  To: Martina Knight NP

## 2025-07-29 ENCOUNTER — OFFICE VISIT (OUTPATIENT)
Dept: FAMILY MEDICINE | Facility: CLINIC | Age: OVER 89
End: 2025-07-29
Payer: MEDICARE

## 2025-07-29 VITALS
SYSTOLIC BLOOD PRESSURE: 104 MMHG | HEIGHT: 66 IN | OXYGEN SATURATION: 96 % | BODY MASS INDEX: 28.45 KG/M2 | HEART RATE: 78 BPM | DIASTOLIC BLOOD PRESSURE: 60 MMHG | WEIGHT: 177 LBS

## 2025-07-29 DIAGNOSIS — R07.81 RIB PAIN ON RIGHT SIDE: Primary | ICD-10-CM
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
## 2025-07-29 DIAGNOSIS — M19.90 OSTEOARTHRITIS, UNSPECIFIED OSTEOARTHRITIS TYPE, UNSPECIFIED SITE: ICD-10-CM

## 2025-07-29 DIAGNOSIS — F02.B0 MODERATE LATE ONSET ALZHEIMER'S DEMENTIA WITHOUT BEHAVIORAL DISTURBANCE, PSYCHOTIC DISTURBANCE, MOOD DISTURBANCE, OR ANXIETY: ICD-10-CM

## 2025-07-29 DIAGNOSIS — G30.1 MODERATE LATE ONSET ALZHEIMER'S DEMENTIA WITHOUT BEHAVIORAL DISTURBANCE, PSYCHOTIC DISTURBANCE, MOOD DISTURBANCE, OR ANXIETY: ICD-10-CM

## 2025-07-29 PROCEDURE — 1125F AMNT PAIN NOTED PAIN PRSNT: CPT | Mod: CPTII,S$GLB,, | Performed by: NURSE PRACTITIONER

## 2025-07-29 PROCEDURE — 1160F RVW MEDS BY RX/DR IN RCRD: CPT | Mod: CPTII,S$GLB,, | Performed by: NURSE PRACTITIONER

## 2025-07-29 PROCEDURE — 1101F PT FALLS ASSESS-DOCD LE1/YR: CPT | Mod: CPTII,S$GLB,, | Performed by: NURSE PRACTITIONER

## 2025-07-29 PROCEDURE — 99214 OFFICE O/P EST MOD 30 MIN: CPT | Mod: S$GLB,,, | Performed by: NURSE PRACTITIONER

## 2025-07-29 PROCEDURE — 1159F MED LIST DOCD IN RCRD: CPT | Mod: CPTII,S$GLB,, | Performed by: NURSE PRACTITIONER

## 2025-07-29 PROCEDURE — 99999 PR PBB SHADOW E&M-EST. PATIENT-LVL III: CPT | Mod: PBBFAC,,, | Performed by: NURSE PRACTITIONER

## 2025-07-29 PROCEDURE — 3288F FALL RISK ASSESSMENT DOCD: CPT | Mod: CPTII,S$GLB,, | Performed by: NURSE PRACTITIONER

## 2025-07-29 NOTE — PROGRESS NOTES
Patient ID: Chloe Foster is a 89 y.o. female.     Chief Complaint: Rib Pain (Side pain she had a fall two nights ago and she's complaining of pain on the right side and she feels dizzy she stated.)      HPI:  History of Present Illness    CHIEF COMPLAINT:  Patient presents today for evaluation of pain following a fall two days ago.    RECENT FALLS:  She reports two falls in the past week. The first occurred two nights ago in the bathroom, resulting in a pulled-off toilet paper cowan and shifted toilet. The second fall occurred the following night around 3:00 AM when she was found on the floor next to her bed. She denies loss of consciousness or head injury associated with these falls. She has a history of multiple falls in recent years. She has dementia which contributes to risk of falls.     CURRENT PAIN:  She reports right side pain and bruising on her buttocks following her recent fall. The pain is exacerbated by movement, causing her to wince when jostled or during slight physical disturbances. Initially attributing it to muscle strain, she declined immediate medical evaluation but now reports the pain is significant enough to warrant medical attention.    MOBILITY AND ASSISTIVE DEVICES:  She tends not to use her prescribed walker and ambulates independently. She has a cane available but rarely uses it. Her caregiver notes she recently used the cane for the first time in an extended period when prompted about her walker. Mobility issue is mainly at night. During the day she uses her walker pretty consistently. She has arthritis, advanced degenerative disc disease in the spine, and knee issues. X-rays of ribs and spine in 5/25 after another fall showed no acute injuries but confirmed chronic degenerative changes.        ROS:  General: -fever, -chills, -fatigue, -weight gain, -weight loss  Eyes: -vision changes, -redness, -discharge  ENT: -ear pain, -nasal congestion, -sore throat  Cardiovascular:  -chest pain, -palpitations, -lower extremity edema  Respiratory: -cough, -shortness of breath  Gastrointestinal: -abdominal pain, -nausea, -vomiting, -diarrhea, -constipation, -blood in stool  Genitourinary: -dysuria, -hematuria, -frequency  Musculoskeletal: -joint pain, -muscle pain, +pain with movement, +limb pain, +difficulty standing up  Skin: -rash, -lesion  Neurological: -headache, -dizziness, -numbness, -tingling  Psychiatric: -anxiety, -depression, -sleep difficulty            Active Problem List with Overview Notes    Diagnosis Date Noted    Personal history of fall 04/01/2025    Aortic atherosclerosis 09/27/2024    Stage 3b chronic kidney disease 08/17/2024    Anemia in stage 3b chronic kidney disease 08/17/2024    Iron deficiency anemia 08/17/2024    H/O: hematuria 08/17/2024    Vitamin D deficiency 08/17/2024    Seasonal allergic rhinitis 04/10/2023    Fatigue 10/10/2022    Physical deconditioning 09/12/2022    Moderate late onset Alzheimer's dementia without behavioral disturbance, psychotic disturbance, mood disturbance, or anxiety 09/12/2022    Paresthesia of both hands 10/17/2021    Dysthymia 04/15/2021    Chronic pain of left knee 10/15/2020    Acquired hypothyroidism 05/18/2015    Recurrent UTI 05/05/2015    Bladder cystocele 05/05/2015    Hearing loss, sensorineural 02/24/2015    Nontoxic multinodular goiter 02/18/2013    Postablative hypothyroidism 01/16/2013    Hyperlipidemia 01/16/2013    IGT (impaired glucose tolerance)            Currently Medications  Medications Ordered Prior to Encounter[1]    Allergies  Review of patient's allergies indicates:   Allergen Reactions    Bactrim [sulfamethoxazole-trimethoprim] Other (See Comments)     She just felt bad    Sulfa (sulfonamide antibiotics) Other (See Comments)     Unsure of reaction but felt fatigued on sulfa drug        Health Maintenance  You are up to date for your primary preventive health care, and there are no reminders at this time.      PMH:  Past Medical History:   Diagnosis Date    Anemia in stage 3b chronic kidney disease 8/17/2024    Anxiety     Black eye 05/06/2013    Dementia     Dementia without behavioral disturbance 04/15/2021    Dyslipidemia     Essential hypertension 10/10/2022    Hyperthyroidism     Hypothyroidism, unspecified     IGT (impaired glucose tolerance)     Mitral valve prolapse     Nontoxic multinodular goiter 02/18/2013    Primary hypothyroidism 05/18/2015    Shingles     left side of chest and back    Thyroid nodule 02/18/2013    Urinary tract infection       Past Surgical History:   Procedure Laterality Date    CATARACT EXTRACTION BILATERAL W/ ANTERIOR VITRECTOMY      DILATION AND CURETTAGE OF UTERUS      x2      Social History     Socioeconomic History    Marital status:    Occupational History    Occupation: Retired   Tobacco Use    Smoking status: Never    Smokeless tobacco: Never   Substance and Sexual Activity    Alcohol use: No     Alcohol/week: 0.0 standard drinks of alcohol    Drug use: Never    Sexual activity: Never   Social History Narrative    Her  passed away in March 2014. She lives with her  and has sitters and a son who helps. She has four children two of whom are twins. Her son is running the family business. Her children are all educated. She worked doing  for the [employer]. She has an education degree and is a retired . She started a travel agency for 15 years. She is a non-smoker and denies alcohol or substance abuse.                      Social Drivers of Health     Financial Resource Strain: Low Risk  (3/31/2025)    Overall Financial Resource Strain (CARDIA)     Difficulty of Paying Living Expenses: Not hard at all   Food Insecurity: No Food Insecurity (3/31/2025)    Hunger Vital Sign     Worried About Running Out of Food in the Last Year: Never true     Ran Out of Food in the Last Year: Never true   Transportation Needs: No Transportation Needs  "(3/31/2025)    PRAPARE - Transportation     Lack of Transportation (Medical): No     Lack of Transportation (Non-Medical): No   Physical Activity: Insufficiently Active (3/31/2025)    Exercise Vital Sign     Days of Exercise per Week: 4 days     Minutes of Exercise per Session: 20 min   Stress: Patient Declined (3/31/2025)    [employer] [employer] - Occupational Stress Questionnaire     Feeling of Stress : Patient declined   Housing Stability: Low Risk  (3/31/2025)    Housing Stability Vital Sign     Unable to Pay for Housing in the Last Year: No     Number of Times Moved in the Last Year: 0     Homeless in the Last Year: No      Family History   Problem Relation Name Age of Onset    Thyroid disease Father      Anemia Sister      Anemia Brother      Anemia Maternal Aunt      Thyroid disease Daughter      Thyroid disease Son      Diabetes Neg Hx      Breast cancer Neg Hx      Colon cancer Neg Hx      Ovarian cancer Neg Hx            Physical  Exam  Vitals:    07/29/25 1434   BP: 104/60   Pulse: 78   SpO2: 96%   Weight: 80.3 kg (177 lb)   Height: 5' 6" (1.676 m)      Body mass index is 28.57 kg/m².  Wt Readings from Last 3 Encounters:   07/29/25 80.3 kg (177 lb)   07/21/25 80.3 kg (177 lb 0.5 oz)   05/14/25 81 kg (178 lb 7.4 oz)           Physical Exam    General: No acute distress. Well-developed. Well-nourished.  Eyes: EOMI. Sclerae anicteric.  HENT: Normocephalic. Atraumatic. Nares patent. Moist oral mucosa.  Cardiovascular: Regular rate. Regular rhythm. No murmurs. No rubs. No gallops. Normal S1, S2.  Respiratory: Normal respiratory effort. Clear to auscultation bilaterally. No rales. No rhonchi. No wheezing. Pain with deep breathing on the right lower rib cage   Musculoskeletal: No obvious deformity. Pain on palpation of right side rib cage.  Extremities: No lower extremity edema.  Neurological: Alert & oriented x3. No slurred speech.   Psychiatric: Normal mood. Normal affect. Good insight. " Good judgment.  Skin: Warm. Dry. No rash.          Labs:    A1C:  Recent Labs   Lab 10/10/22  1416 10/13/23  1110 09/26/24  1012   Hemoglobin A1C 5.6 5.5 5.2     CBC:  Recent Labs   Lab 07/01/24  1053 08/07/24  1703 12/11/24  1128   WBC 10.02 11.29 10.12  10.12   RBC 3.80 L 4.03 4.24  4.24   Hemoglobin 11.7 L 12.4 12.9  12.9   Hematocrit 35.8 L 37.1 40.5  40.5   Platelets 275 246 245  245   MCV 94 92 96  96   MCH 30.8 30.8 30.4  30.4   MCHC 32.7 33.4 31.9 L  31.9 L     CMP:  Recent Labs   Lab 07/01/24  1053 08/07/24  1703 12/11/24  1128   Glucose 101 96 96  96   Calcium 9.7 9.4 9.5  9.5   Albumin 3.3 L 3.7 3.5  3.5   Total Protein 6.7 6.8 7.0  7.0   Sodium 139 136 139  139   Potassium 4.2 4.5 4.5  4.5   CO2 23 24 27  27   Chloride 104 104 103  103   BUN 23 27 25  25   Creatinine 1.0 0.9 0.9  0.9   Alkaline Phosphatase 80 79 98  98   ALT 11 13 15  15   AST 20 19 20  20   Total Bilirubin 0.3 0.3 0.4  0.4     LIPIDS:  Recent Labs   Lab 10/10/22  1415 10/13/23  1110 03/27/24  1123 07/01/24  1053 09/26/24  1012   TSH 2.090 2.550   < > 1.974  --    HDL 38 L 46  --   --  42   Cholesterol 179 189  --   --  168   Triglycerides 221 H 128  --   --  104   LDL Cholesterol 96.8 117.4  --   --  105.2   HDL/Cholesterol Ratio 21.2 24.3  --   --  25.0   Non-HDL Cholesterol 141 143  --   --  126   Total Cholesterol/HDL Ratio 4.7 4.1  --   --  4.0    < > = values in this interval not displayed.     TSH:  Recent Labs   Lab 03/27/24  1123 06/17/24  1155 07/01/24  1053   TSH 2.420 1.625 1.974              Assessment and Plan:  1. Rib pain on right side  - X-Ray Ribs 2 View Right; Future    2. Fall, initial encounter    3. Osteoarthritis, unspecified osteoarthritis type, unspecified site    4. Moderate late onset Alzheimer's dementia without behavioral disturbance, psychotic disturbance, mood disturbance, or anxiety       ## REPEATED FALLS / HISTORY OF FALLING:  - Monitored the patient's multiple falls, including  recent falls in the bathroom and next to the bed.  - Patient has had several falls mainly during the night going to the bathroom   - Evaluated the patient's tendency not to use a walker and forgetfulness in using it, which contributes to falls.  - Assessed fall prevention strategies with the patient.  - Recommend using a bedside commode at night instead of walking to bathroom.  - Advised considering use of bed alarm to alert caregiver when getting up at night.  - Instructed the patient to explore options for bathroom safety modifications (e.g., toilet handles, non-slip mats).  - Discussed the importance of using assistive devices consistently.    ## PAIN IN RIGHT RIBCAGE   - Monitored the patient's complaints of pain in the right ribcage area, especially when jostled.  - Evaluated that the patient does not complain of pain often, but is currently experiencing pain in this area.  - Performed physical exam of the area to check for bruising or pain.  - Prescribed Lidocaine patch for pain relief in the shoulder area.    ## CHEST PAIN ON BREATHING / INTERCOSTAL PAIN:  - Monitored the patient's intercostal pain, especially when breathing deeply.  - Evaluated that the patient's chest sounds good despite pain.  - Considered possibility of rib fracture or costochondritis due to reported pain and wincing with movement.  - Ordered XR Ribs to rule out fracture and ensure no acute injuries.  - Prescribed lidocaine patch to be applied over area of pain, to be worn for 12 hours daily when the patient is active.  - Explained importance of deep breathing and coughing exercises to prevent pneumonia, especially if rib injury present, despite the pain.    ## OSTEOARTHRITIS:  - Monitored the patient's advanced degenerative disc disease and unspecified osteoarthritis.  - Discussed the mixed effectiveness of meloxicam for osteoarthritis pain management.  - Discontinued meloxicam daily use; advised to use as needed and monitor for  increased pain.      ## DEPENDENCE ON ASSISTIVE DEVICES:  - Monitored the patient's tendency not to use assistive devices properly.  - Evaluated that the patient has a cane but does not use it often and forgets to use her walker, contributing to falls.  - Discussed and recommended consistent use of assistive devices including walker, bedside commode, bed alarm, and toilet seat handles to prevent future falls.    ## FOLLOW-UP:  - Will message the patient's caregiver through portal with results either today or tomorrow morning.    - Follow up for any worsening of symptoms           This note was generated with the assistance of ambient listening technology. Verbal consent was obtained by the patient and accompanying visitor(s) for the recording of patient appointment to facilitate this note. I attest to having reviewed and edited the generated note for accuracy, though some syntax or spelling errors may persist. Please contact the author of this note for any clarification.       DAI Villegas  [facility] [place]  25        [1]   Current Outpatient Medications on File Prior to Visit   Medication Sig Dispense Refill    ANTIOX#10/OM3/DHA/EPA/LUT/ZEAX (I-CAPS ORAL) Take 2 capsules by mouth once daily.      calcium-vitamin D (OSCAL) 250 (625)-125 mg-unit per tablet Take 1 tablet by mouth 2 (two) times daily.      ciprofloxacin-dexAMETHasone 0.3-0.1% (CIPRODEX) 0.3-0.1 % DrpS Place 4 drops into the right ear 2 (two) times daily. for 10 days 7.5 mL 1    donepeziL (ARICEPT) 5 MG tablet Take 1 tablet (5 mg total) by mouth once daily. 90 tablet 3    erythromycin (ROMYCIN) ophthalmic ointment SMARTSI.25 Inch(es) Right Eye Twice Daily      levothyroxine (SYNTHROID) 75 MCG tablet Take 1 tablet (75 mcg total) by mouth before breakfast. 90 tablet 2    meloxicam (MOBIC) 7.5 MG tablet Take 1 tablet (7.5 mg total) by mouth once daily. 90 tablet 0    multivit-iron-FA-calcium-mins 18 mg iron-400 mcg-500 mg  Ca Tab Take 1 tablet by mouth once daily.      neomycin-polymyxin-dexamethasone (DEXACINE) 3.5 mg/g-10,000 unit/g-0.1 % Oint SMARTSI.25 Inch(es) Right Eye Twice Daily      prednisoLONE acetate (PRED FORTE) 1 % DrpS Apply 2 drops  to affected ear BID x 10 days 10 mL 2    sertraline (ZOLOFT) 100 MG tablet Take 1 tablet (100 mg total) by mouth once daily. 90 tablet 3    [DISCONTINUED] nitrofurantoin, macrocrystal-monohydrate, (MACROBID) 100 MG capsule Take 1 capsule (100 mg total) by mouth 2 (two) times daily. 14 capsule 0     No current facility-administered medications on file prior to visit.

## 2025-08-06 ENCOUNTER — PATIENT MESSAGE (OUTPATIENT)
Dept: FAMILY MEDICINE | Facility: CLINIC | Age: OVER 89
End: 2025-08-06
Payer: MEDICARE

## 2025-08-13 ENCOUNTER — PATIENT MESSAGE (OUTPATIENT)
Dept: FAMILY MEDICINE | Facility: CLINIC | Age: OVER 89
End: 2025-08-13
Payer: MEDICARE

## 2025-08-13 DIAGNOSIS — M19.90 OSTEOARTHRITIS, UNSPECIFIED OSTEOARTHRITIS TYPE, UNSPECIFIED SITE: ICD-10-CM

## 2025-08-14 DIAGNOSIS — M19.90 OSTEOARTHRITIS, UNSPECIFIED OSTEOARTHRITIS TYPE, UNSPECIFIED SITE: ICD-10-CM

## 2025-08-14 RX ORDER — MELOXICAM 7.5 MG/1
7.5 TABLET ORAL DAILY
Qty: 90 TABLET | Refills: 0 | Status: SHIPPED | OUTPATIENT
Start: 2025-08-14 | End: 2025-08-15 | Stop reason: SDUPTHER

## 2025-08-14 RX ORDER — MELOXICAM 7.5 MG/1
7.5 TABLET ORAL DAILY
Qty: 90 TABLET | Refills: 0 | Status: SHIPPED | OUTPATIENT
Start: 2025-08-14 | End: 2025-08-14 | Stop reason: SDUPTHER

## 2025-08-15 DIAGNOSIS — M19.90 OSTEOARTHRITIS, UNSPECIFIED OSTEOARTHRITIS TYPE, UNSPECIFIED SITE: ICD-10-CM

## 2025-08-15 RX ORDER — MELOXICAM 7.5 MG/1
7.5 TABLET ORAL DAILY
Qty: 90 TABLET | Refills: 0 | Status: SHIPPED | OUTPATIENT
Start: 2025-08-15